# Patient Record
Sex: FEMALE | Race: BLACK OR AFRICAN AMERICAN | Employment: UNEMPLOYED | URBAN - METROPOLITAN AREA
[De-identification: names, ages, dates, MRNs, and addresses within clinical notes are randomized per-mention and may not be internally consistent; named-entity substitution may affect disease eponyms.]

---

## 2017-05-05 ENCOUNTER — OFFICE VISIT (OUTPATIENT)
Dept: HEMATOLOGY | Age: 37
End: 2017-05-05

## 2017-05-05 VITALS
HEIGHT: 68 IN | DIASTOLIC BLOOD PRESSURE: 98 MMHG | SYSTOLIC BLOOD PRESSURE: 140 MMHG | WEIGHT: 188.13 LBS | BODY MASS INDEX: 28.51 KG/M2 | OXYGEN SATURATION: 98 % | TEMPERATURE: 98.3 F | HEART RATE: 85 BPM

## 2017-05-05 DIAGNOSIS — R74.8 ELEVATED LIVER ENZYMES: Primary | ICD-10-CM

## 2017-05-05 PROBLEM — R73.9 STEROID-INDUCED HYPERGLYCEMIA: Status: ACTIVE | Noted: 2017-05-05

## 2017-05-05 PROBLEM — R56.9 SEIZURES (HCC): Status: ACTIVE | Noted: 2017-05-05

## 2017-05-05 PROBLEM — C71.9 GLIOBLASTOMA (HCC): Status: ACTIVE | Noted: 2017-05-05

## 2017-05-05 PROBLEM — R53.1 LEFT-SIDED WEAKNESS: Status: ACTIVE | Noted: 2017-05-05

## 2017-05-05 PROBLEM — T38.0X5A STEROID-INDUCED HYPERGLYCEMIA: Status: ACTIVE | Noted: 2017-05-05

## 2017-05-05 RX ORDER — OXYCODONE HCL 40 MG/1
40 TABLET, FILM COATED, EXTENDED RELEASE ORAL EVERY 12 HOURS
COMMUNITY
End: 2021-01-01

## 2017-05-05 RX ORDER — LACTULOSE 10 G/15ML
10 SOLUTION ORAL; RECTAL 3 TIMES DAILY
COMMUNITY
End: 2021-01-01

## 2017-05-05 RX ORDER — LEVETIRACETAM 250 MG/1
TABLET ORAL 2 TIMES DAILY
COMMUNITY
End: 2021-01-01 | Stop reason: DRUGHIGH

## 2017-05-05 RX ORDER — METFORMIN HYDROCHLORIDE 500 MG/1
500 TABLET ORAL 2 TIMES DAILY WITH MEALS
COMMUNITY
Start: 2021-01-01

## 2017-05-05 RX ORDER — FUROSEMIDE 20 MG/1
20 TABLET ORAL DAILY
COMMUNITY
End: 2021-01-01

## 2017-05-05 RX ORDER — DEXAMETHASONE 4 MG/1
TABLET ORAL EVERY 12 HOURS
COMMUNITY
End: 2021-01-01 | Stop reason: DRUGHIGH

## 2017-05-05 RX ORDER — POTASSIUM CHLORIDE 750 MG/1
10 CAPSULE, EXTENDED RELEASE ORAL 2 TIMES DAILY
COMMUNITY
End: 2021-01-01

## 2017-05-05 NOTE — PROGRESS NOTES
[address] Valentin Aguilar MD, JUANIS Terrell PA-C Celene Maudlin, MD, MD Mendy Fuchs NP Harriett Czar, NP        [address] [facility], [address] [facility], [address]     [phone]     FAX: 062 [address], [address] [place] [address], [address]     [phone]     FAX: [phone]       Patient Care Team:  Mike Edmondson MD (Hematology and Oncology)      Problem List  Date Reviewed: 5/5/2017          Codes Class Noted    Glioblastoma ([facility].) ICD-10-CM: C71.9  ICD-9-CM: 191.9  5/5/2017        Elevated liver enzymes ICD-10-CM: R74.8  ICD-9-CM: 790.5  5/5/2017        Seizures ([facility].) ICD-10-CM: R56.9  ICD-9-CM: 780.39  5/5/2017        Steroid-induced hyperglycemia ICD-10-CM: R73.9  ICD-9-CM: 790.29  5/5/2017    Overview Signed 5/5/2017 12:59 PM by Yeimy Brewster MD     Treated with metformin             Left-sided weakness ICD-10-CM: R53.1  ICD-9-CM: 728.87  5/5/2017                The physicians listed above have asked me to see Gurpreet Sterling in consultation regarding elevated liver enzymes and its management. All medical records sent by the referring physicians were reviewed including imaging studies and pathology. The patient is a 43 y.o. Black female with glioblastoma that was identified in 1/2016. She was treated with surgical resection, followed by XRT and now  chemotherapy. Chemotherapy started in about 9/2016. She was noted to have elevated liver transaminases and then a rising TBILI in 10-11/2016. Serologic evaluation for markers of chronic liver disease were either not performed or available to me. Imaging of the liver was either not performed or the results are not available to me. A liver biopsy was performed in 1/2017.    This demonstrated macrovesicualr steatosis with no inflammation or fibrosis. The most recent laboratory studies indicate that the liver transaminases are elevated, ALP is elevated, total bilirubin is elevated, albumin is normal, and the platelet count is normal.    The patient notes confusoion whenever the dose of decedron is reduced. Otherwise there is no confusion. There is edema of the left leg. The patient has limitations in functional activities secondary to other medical problems that are not related to the liver disease. ALLERGIES  Not on File    MEDICATIONS  Current Outpatient Prescriptions   Medication Sig    furosemide (LASIX) 20 mg tablet Take  by mouth daily.  levETIRAcetam (KEPPRA) 250 mg tablet Take  by mouth two (2) times a day.  potassium chloride SA (MICRO-K) 10 mEq capsule Take 10 mEq by mouth two (2) times a day.  sennosides (SENNA) 8.6 mg cap Take  by mouth.  oxyCODONE ER (OXYCONTIN) 40 mg ER tablet Take 40 mg by mouth every twelve (12) hours.  metFORMIN (GLUCOPHAGE) 500 mg tablet Take  by mouth two (2) times daily (with meals).  dexamethasone (DECADRON) 4 mg tablet Take  by mouth every twelve (12) hours.  lactulose (CHRONULAC) 10 gram/15 mL solution Take  by mouth three (3) times daily. No current facility-administered medications for this visit. SYSTEM REVIEW NOT RELATED TO LIVER DISEASE OR REVIEWED ABOVE:  Constitution systems: Negative for fever, chills, weight gain, weight loss. Eyes: Negative for visual changes. ENT: Negative for sore throat, painful swallowing. Respiratory: Negative for cough, hemoptysis, SOB. Cardiology: Negative for chest pain, palpitations. GI:  Negative for constipation or diarrhea. : Negative for urinary frequency, dysuria, hematuria, nocturia. Skin: Negative for rash. Hematology: Negative for easy bruising, blood clots. Musculo-skelatal: Negative for back pain, muscle pain, weakness.   Neurologic: Negative for headaches, dizziness, vertigo, memory problems not related to HE. Psychology: Negative for anxiety, depression. FAMILY HISTORY:  The patient has no knowledge of the father's medical condition. The mother  of CVA. There is no family history of liver disease. SOCIAL HISTORY:  The patient is . The patient has no children. The patient has never used tobacco products. The patient has previously consumed alcohol socially never in excess. The patient has been abstinent from alcohol since 2016. The patient used to work as a . PHYSICAL EXAMINATION:  BP (!) 140/98  Pulse 85  Temp 98.3 °F (36.8 °C) (Tympanic)   Ht 5' 8\" (1.727 m)  Wt 188 lb 2 oz (85.3 kg)  SpO2 98%  BMI 28.6 kg/m2  General: No acute distress. Eyes: Sclera anicteric. ENT: No oral lesions. Thyroid normal.  Nodes: No adenopathy. Skin: No spider angiomata. No jaundice. No palmar erythema. Respiratory: Lungs clear to auscultation. Cardiovascular: Regular heart rate. No murmurs. No JVD. Abdomen: Soft non-tender. Liver size normal to percussion/palpation. Spleen not palpable. No obvious ascites. Extremities: 2+ lower edema only on the left. No muscle wasting. No gross arthritic changes. Neurologic: Alert and oriented. Left sided weakness. Cranial nerves grossly intact. No asterixis.     LABORATORY STUDIES:  From 2017  AST/ALT/ALP/T Bili/ALB:  267/311/418/4.4    From 2017  AST/ALT/ALP/T Bili/ALB:  259/381/521/5.8/3.7  WBC/HB/PLT/INR:  12.2/10.3/283  BUN/CREAT:  10/0.45  Ammonia 512    Liver [facility] Latest Ref Rng & Units 2017   WBC 3.4 - 10.8 x10E3/uL 9.6   ANC 1.4 - 7.0 x10E3/uL 8.5 (H)   HGB 11.1 - 15.9 g/dL 9.3 (L)    - 379 x10E3/uL 239   INR 0.8 - 1.2 0.9   AST 0 - 40 IU/L 264 (H)   ALT 0 - 32 IU/L 354 (H)   Alk Phos 39 - 117 IU/L 521 (H)   Bili, Total 0.0 - 1.2 mg/dL 5.6 (H)   Bili, Direct 0.00 - 0.40 mg/dL 4.17 (H)   Albumin 3.5 - 5.5 g/dL 3.7   BUN 6 - 20 mg/dL 6   Creat 0.57 - 1.00 mg/dL 0.42 (L)   Na 134 - 144 mmol/L 137   K 3.5 - 5.2 mmol/L 3.9   Cl 96 - 106 mmol/L 93 (L)   CO2 18 - 29 mmol/L 26   Glucose 65 - 99 mg/dL 132 (H)   Ammonia ug/dL CANCELED     SEROLOGIES:  Serologies Latest Ref Rng & Units 5/5/2017   Hep B Surface Ag Negative Negative   Hep B Core Ab, Total Negative Negative   Hep B Surface AB QL  Non Reactive   Hep C Ab 0.0 - 0.9 s/co ratio <0.1     LIVER HISTOLOGY:  1/2017. Severe steatosis with no inflammation or fibrosis. Biopsy slides not reviewed by MLS. ENDOSCOPIC PROCEDURES:  Not available or performed    RADIOLOGY:  Not available or performed    OTHER TESTING:  Not available or performed    ASSESSMENT AND PLAN:  Persistent elevation in liver transaminases and alkaline phosphate. This appears to be a response to chemotherapy she is recieving for glioblastoma. Have performed laboratory testing to monitor liver function and degree of liver injury. This included BMP, hepatic panel, CBC with platelet count, INR. Total bilirubin is elevated but appers to have stabilized in the 5 range. Serum albumin is normal.  INR is normal.      The platelet count is normal.      Dr Jerry Thomas is concerned that she is developing liver failure. HOwever, the serum albumin is normal and the INR is normal.  This is not liver failure. The liver biopsy demonstrates severe steatosis without inflammation or fibrosis. This is likely an effect from chemotherapy which has caused some mitochondrial injury. She has never been on Valproic acid for seizures which can cause microvesicular steatosis and liver failure. The elevation in ammonia is concerning and suggests liver failure. However, measurements of serum ammonia are notoriously inaccurate and the actual level of serum ammonia does not indicate hepatic encephalopathy if the patient does not have symptoms of HE.     [address] [place] [address] has had periods of confusion but this is has only occurred when the dose of decradon is lowered. I therefore suspect that confusion is from increased intracranial pressure from the glioblastoma and edema from the tumor and chemotherapy. I do not think she has HE since the confusion improved with decedron and does not appear to be affected in any way by the lactulose she was placed on. On the other hand elevation in ammonia can be a sign of liver failure from mitochondrial toxicity and this is consistent with the steatosis seen in the liver biopsy. Again I do not think this is HE because of her improvement in cognitive function with decedrone. Serologic testing to for viral hepatitis were ordered since I have no records these were done previously. I suspect they were done by Dr Kris Vázquez but I do not have access to his notes or labs. 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 Unfortunately, the only thing I think we can do right now is to hold the chemotherapy agents we think contributed to this and see if this resolves. I will discuss with Dr Glo Rinne how long she things she can hold this or whether she can switch to an alternate chemotherapy regimen. We should monitor the liver panel and ammonia weekly to see what is happening. The patient had a liver biopsy performed in 1/2017 at [facility]. We will request that the liver biopsy slides sent be to me for my personal review. The patient was directed to continue all current medications at the current dosages. There are no contraindications for the patient to take any medications that are necessary for treatment of other medical issues. The patient was counseled regarding alcohol consumption. We should do an MRCP if this has not been previously performed. All of the above issues were discussed with the patient. All questions were answered. The patient expressed a clear understanding of the above.     Follow-up Liver Morgan Ochoa in 2 weeks to review all data and determine the treatment plan.     Brennon Izaguirre MD  Liver [place] of [address] [place] [address] [address] [facility] [address]y [address]  [address]  [address].  [phone]

## 2017-05-05 NOTE — MR AVS SNAPSHOT
Visit Information Date & Time Provider Department Dept. Phone Encounter #  
 5/5/2017 12:30 PM Sonal Rubio MD [facility] [id number] Follow-up Instructions Return in about 2 weeks (around 5/19/2017) for MLS. Your Appointments 5/18/2017  8:15 AM  
Follow Up with Sonal Rubio MD  
[facility] [address] ([address]) [address] [place] [address] At [facility] [phone] [facility] [address]  
59 [facility] [address] Upcoming Health Maintenance Date Due DTaP/Tdap/Td series (1 - Tdap) 6/12/1999 PAP AKA CERVICAL CYTOLOGY 6/12/1999 INFLUENZA AGE 9 TO ADULT 8/1/2017 Allergies as of 5/5/2017  Review Complete On: 5/5/2017 By: Bladimir Bass LPN Not on File Current Immunizations  Never Reviewed No immunizations on file. Not reviewed this visit You Were Diagnosed With   
  
 Codes Comments Elevated liver enzymes    -  Primary ICD-10-CM: R74.8 ICD-9-CM: 790.5 Vitals BP Pulse Temp Height(growth percentile) Weight(growth percentile) SpO2  
 (!) 140/98 85 98.3 °F (36.8 °C) (Tympanic) 5' 8\" (1.727 m) 188 lb 2 oz (85.3 kg) 98% BMI Smoking Status 28.6 kg/m2 Never Smoker BMI and BSA Data Body Mass Index Body Surface Area  
 28.6 kg/m 2 2.02 m 2 Your Updated Medication List  
  
   
This list is accurate as of: 5/5/17  1:23 PM.  Always use your most recent med list.  
  
  
  
  
 dexamethasone 4 mg tablet Commonly known as:  DECADRON Take  by mouth every twelve (12) hours. furosemide 20 mg tablet Commonly known as:  LASIX Take  by mouth daily. lactulose 10 gram/15 mL solution Commonly known as:  Clemmie Remak Take  by mouth three (3) times daily. levETIRAcetam 250 mg tablet Commonly known as:  KEPPRA Take  by mouth two (2) times a day. metFORMIN 500 mg tablet Commonly known as:  GLUCOPHAGE Take  by mouth two (2) times daily (with meals). oxyCODONE ER 40 mg ER tablet Commonly known as:  OxyCONTIN Take 40 mg by mouth every twelve (12) hours. potassium chloride SA 10 mEq capsule Commonly known as:  Julaine Postin Take 10 mEq by mouth two (2) times a day. Senna 8.6 mg Cap Generic drug:  sennosides Take  by mouth. We Performed the Following AMMONIA [id number] CPT(R)] CBC WITH AUTOMATED DIFF [46601 CPT(R)] HCV AB W/REFLEX VERIFICATION [[id number] Custom] HEP B SURFACE AB [id number] CPT(R)] HEP B SURFACE AG [id number] CPT(R)] HEPATIC FUNCTION PANEL [65146 CPT(R)] HEPATITIS B CORE AB, TOTAL [id number] CPT(R)] METABOLIC PANEL, BASIC [95863 CPT(R)] PROTHROMBIN TIME + INR [38240 CPT(R)] Follow-up Instructions Return in about 2 weeks (around 5/19/2017) for MLS. Introducing [facility] & HEALTH SERVICES! Pamela Jain introduces Graphenea patient portal. Now you can access parts of your medical record, email your doctor's office, and request medication refills online. 1. In your internet browser, go to [email or web address] 2. Click on the First Time User? Click Here link in the Sign In box. You will see the New Member Sign Up page. 3. Enter your Graphenea Access Code exactly as it appears below. You will not need to use this code after youve completed the sign-up process. If you do not sign up before the expiration date, you must request a new code. · Graphenea Access Code: [email or web address] Expires: 8/3/2017  1:17 PM 
 
4. Enter the last four digits of your Social Security Number (xxxx) and Date of Birth (mm/dd/yyyy) as indicated and click Submit. You will be taken to the next sign-up page. 5. Create a Contego [employer]ud Solutionst ID. This will be your Graphenea login ID and cannot be changed, so think of one that is secure and easy to remember. 6. Create a Graphenea password. You can change your password at any time. 7. Enter your Password Reset Question and Answer. This can be used at a later time if you forget your password. 8. Enter your e-mail address. You will receive e-mail notification when new information is available in [address] 9. Click Sign Up. You can now view and download portions of your medical record. 10. Click the Download Summary menu link to download a portable copy of your medical information. If you have questions, please visit the Frequently Asked Questions section of the Kickboard website. Remember, Kickboard is NOT to be used for urgent needs. For medical emergencies, dial 911. Now available from your iPhone and Android! Please provide this summary of care documentation to your next provider. If you have any questions after today's visit, please call [phone].

## 2017-05-06 LAB
ALBUMIN SERPL-MCNC: 3.7 G/DL (ref 3.5–5.5)
ALP SERPL-CCNC: 521 IU/L (ref 39–117)
ALT SERPL-CCNC: 354 IU/L (ref 0–32)
AMMONIA PLAS-MCNC: NORMAL UG/DL (ref 27–102)
AST SERPL-CCNC: 264 IU/L (ref 0–40)
BASOPHILS # BLD AUTO: 0 X10E3/UL (ref 0–0.2)
BASOPHILS NFR BLD AUTO: 0 %
BILIRUB DIRECT SERPL-MCNC: 4.17 MG/DL (ref 0–0.4)
BILIRUB SERPL-MCNC: 5.6 MG/DL (ref 0–1.2)
BUN SERPL-MCNC: 6 MG/DL (ref 6–20)
BUN/CREAT SERPL: 14 (ref 9–23)
CALCIUM SERPL-MCNC: 9 MG/DL (ref 8.7–10.2)
CHLORIDE SERPL-SCNC: 93 MMOL/L (ref 96–106)
CO2 SERPL-SCNC: 26 MMOL/L (ref 18–29)
COMMENT, 144067: NORMAL
CREAT SERPL-MCNC: 0.42 MG/DL (ref 0.57–1)
EOSINOPHIL # BLD AUTO: 0 X10E3/UL (ref 0–0.4)
EOSINOPHIL NFR BLD AUTO: 0 %
ERYTHROCYTE [DISTWIDTH] IN BLOOD BY AUTOMATED COUNT: 20.7 % (ref 12.3–15.4)
GLUCOSE SERPL-MCNC: 132 MG/DL (ref 65–99)
HBV CORE AB SERPL QL IA: NEGATIVE
HBV SURFACE AB SER QL: NON REACTIVE
HBV SURFACE AG SERPL QL IA: NEGATIVE
HCT VFR BLD AUTO: 31 % (ref 34–46.6)
HCV AB S/CO SERPL IA: <0.1 S/CO RATIO (ref 0–0.9)
HGB BLD-MCNC: 9.3 G/DL (ref 11.1–15.9)
IMM GRANULOCYTES # BLD: 0.1 X10E3/UL (ref 0–0.1)
IMM GRANULOCYTES NFR BLD: 1 %
INR PPP: 0.9 (ref 0.8–1.2)
LYMPHOCYTES # BLD AUTO: 0.5 X10E3/UL (ref 0.7–3.1)
LYMPHOCYTES NFR BLD AUTO: 5 %
MCH RBC QN AUTO: 26.6 PG (ref 26.6–33)
MCHC RBC AUTO-ENTMCNC: 30 G/DL (ref 31.5–35.7)
MCV RBC AUTO: 89 FL (ref 79–97)
MONOCYTES # BLD AUTO: 0.6 X10E3/UL (ref 0.1–0.9)
MONOCYTES NFR BLD AUTO: 7 %
NEUTROPHILS # BLD AUTO: 8.5 X10E3/UL (ref 1.4–7)
NEUTROPHILS NFR BLD AUTO: 87 %
PLATELET # BLD AUTO: 239 X10E3/UL (ref 150–379)
POTASSIUM SERPL-SCNC: 3.9 MMOL/L (ref 3.5–5.2)
PROT SERPL-MCNC: 6.1 G/DL (ref 6–8.5)
PROTHROMBIN TIME: 9.8 SEC (ref 9.1–12)
RBC # BLD AUTO: 3.49 X10E6/UL (ref 3.77–5.28)
SODIUM SERPL-SCNC: 137 MMOL/L (ref 134–144)
WBC # BLD AUTO: 9.6 X10E3/UL (ref 3.4–10.8)

## 2017-05-18 ENCOUNTER — OFFICE VISIT (OUTPATIENT)
Dept: HEMATOLOGY | Age: 37
End: 2017-05-18

## 2017-05-18 VITALS
TEMPERATURE: 98.8 F | HEART RATE: 84 BPM | DIASTOLIC BLOOD PRESSURE: 97 MMHG | OXYGEN SATURATION: 99 % | SYSTOLIC BLOOD PRESSURE: 151 MMHG | WEIGHT: 191 LBS | HEIGHT: 68 IN | BODY MASS INDEX: 28.95 KG/M2

## 2017-05-18 DIAGNOSIS — K71.9 DRUG INDUCED LIVER DISEASE: Primary | ICD-10-CM

## 2017-05-18 PROBLEM — K76.0 FATTY LIVER: Status: ACTIVE | Noted: 2017-05-18

## 2017-05-18 LAB
BASOPHILS # BLD AUTO: 0 X10E3/UL (ref 0–0.2)
BASOPHILS NFR BLD AUTO: 0 %
EOSINOPHIL # BLD AUTO: 0 X10E3/UL (ref 0–0.4)
EOSINOPHIL NFR BLD AUTO: 0 %
ERYTHROCYTE [DISTWIDTH] IN BLOOD BY AUTOMATED COUNT: 20.3 % (ref 12.3–15.4)
HCT VFR BLD AUTO: 32.6 % (ref 34–46.6)
HGB BLD-MCNC: 9.6 G/DL (ref 11.1–15.9)
IMM GRANULOCYTES # BLD: 0.1 X10E3/UL (ref 0–0.1)
IMM GRANULOCYTES NFR BLD: 1 %
LYMPHOCYTES # BLD AUTO: 0.8 X10E3/UL (ref 0.7–3.1)
LYMPHOCYTES NFR BLD AUTO: 8 %
MCH RBC QN AUTO: 26.4 PG (ref 26.6–33)
MCHC RBC AUTO-ENTMCNC: 29.4 G/DL (ref 31.5–35.7)
MCV RBC AUTO: 90 FL (ref 79–97)
MONOCYTES # BLD AUTO: 0.8 X10E3/UL (ref 0.1–0.9)
MONOCYTES NFR BLD AUTO: 7 %
NEUTROPHILS # BLD AUTO: 8.3 X10E3/UL (ref 1.4–7)
NEUTROPHILS NFR BLD AUTO: 84 %
PLATELET # BLD AUTO: 285 X10E3/UL (ref 150–379)
RBC # BLD AUTO: 3.64 X10E6/UL (ref 3.77–5.28)
WBC # BLD AUTO: 10.1 X10E3/UL (ref 3.4–10.8)

## 2017-05-18 NOTE — MR AVS SNAPSHOT
Visit Information Date & Time Provider Department Dept. Phone Encounter #  
 5/18/2017  8:15 AM Samir Brewster MD [facility] [address] [id number] Follow-up Instructions Return in about 2 weeks (around 6/1/2017) for MLS. Your Appointments 6/13/2017  4:00 PM  
Follow Up with Samir Brewster MD  
[facility] Appt Note: Follow up [address] [facility] [phone] [facility] [address]  
59 [facility] [address] Upcoming Health Maintenance Date Due Pneumococcal 19-64 Highest Risk (1 of 3 - PCV13) 6/12/1997 DTaP/Tdap/Td series (1 - Tdap) 6/12/1999 PAP AKA CERVICAL CYTOLOGY 6/12/1999 INFLUENZA AGE 9 TO ADULT 8/1/2017 Allergies as of 5/18/2017  Review Complete On: 5/18/2017 By: Kaity Alexandra LPN Not on File Current Immunizations  Never Reviewed No immunizations on file. Not reviewed this visit You Were Diagnosed With   
  
 Codes Comments Drug induced liver disease    -  Primary ICD-10-CM: K71.9 ICD-9-CM: 573.3, E980.5 Vitals BP Pulse Temp Height(growth percentile) Weight(growth percentile) SpO2  
 (!) 151/97 84 98.8 °F (37.1 °C) (Tympanic) 5' 8\" (1.727 m) 191 lb (86.6 kg) 99% BMI Smoking Status 29.04 kg/m2 Never Smoker BMI and BSA Data Body Mass Index Body Surface Area 29.04 kg/m 2 2.04 m 2 Your Updated Medication List  
  
   
This list is accurate as of: 5/18/17  9:05 AM.  Always use your most recent med list.  
  
  
  
  
 dexamethasone 4 mg tablet Commonly known as:  DECADRON Take  by mouth every twelve (12) hours. furosemide 20 mg tablet Commonly known as:  LASIX Take  by mouth daily. lactulose 10 gram/15 mL solution Commonly known as:  Merlinda Mazariegos Take  by mouth three (3) times daily. levETIRAcetam 250 mg tablet Commonly known as:  KEPPRA Take  by mouth two (2) times a day. metFORMIN 500 mg tablet Commonly known as:  GLUCOPHAGE Take  by mouth two (2) times daily (with meals). oxyCODONE ER 40 mg ER tablet Commonly known as:  OxyCONTIN Take 40 mg by mouth every twelve (12) hours. potassium chloride SA 10 mEq capsule Commonly known as:  Sydni Bleak Take 10 mEq by mouth two (2) times a day. Senna 8.6 mg Cap Generic drug:  sennosides Take  by mouth. We Performed the Following ACTIN (SMOOTH MUSCLE) ANTIBODY [id number] CPT(R)] ANTI-NEUTROPHIL CYTOPLASMIC AB F315258 CPT(R)] ANTINUCLEAR ANTIBODIES, IFA S2435211 CPT(R)] CBC WITH AUTOMATED DIFF [93747 CPT(R)] HEPATIC FUNCTION PANEL [47018 CPT(R)] METABOLIC PANEL, BASIC [56408 CPT(R)] MITOCHONDRIAL M2 AB [id number] CPT(R)] PROTHROMBIN TIME + INR [66795 CPT(R)] Follow-up Instructions Return in about 2 weeks (around 6/1/2017) for MLS. To-Do List   
 06/17/2017 Imaging:  MRI ABD W MRCP W WO CONT Introducing [facility] & HEALTH SERVICES! Milton Bain introduces Memorop patient portal. Now you can access parts of your medical record, email your doctor's office, and request medication refills online. 1. In your internet browser, go to [email or web address] 2. Click on the First Time User? Click Here link in the Sign In box. You will see the New Member Sign Up page. 3. Enter your Memorop Access Code exactly as it appears below. You will not need to use this code after youve completed the sign-up process. If you do not sign up before the expiration date, you must request a new code. · Memorop Access Code: [email or web address] Expires: 8/3/2017  1:17 PM 
 
4. Enter the last four digits of your Social Security Number (xxxx) and Date of Birth (mm/dd/yyyy) as indicated and click Submit. You will be taken to the next sign-up page. 5. Create a maufait ID. This will be your maufait login ID and cannot be changed, so think of one that is secure and easy to remember. 6. Create a maufait password. You can change your password at any time. 7. Enter your Password Reset Question and Answer. This can be used at a later time if you forget your password. 8. Enter your e-mail address. You will receive e-mail notification when new information is available in [address] 9. Click Sign Up. You can now view and download portions of your medical record. 10. Click the Download Summary menu link to download a portable copy of your medical information. If you have questions, please visit the Frequently Asked Questions section of the maufait website. Remember, maufait is NOT to be used for urgent needs. For medical emergencies, dial 911. Now available from your iPhone and Android! Please provide this summary of care documentation to your next provider. If you have any questions after today's visit, please call [phone].

## 2017-05-19 LAB
ACTIN IGG SERPL-ACNC: 6 UNITS (ref 0–19)
ALBUMIN SERPL-MCNC: 3.6 G/DL (ref 3.5–5.5)
ALP SERPL-CCNC: 615 IU/L (ref 39–117)
ALT SERPL-CCNC: 372 IU/L (ref 0–32)
ANA TITR SER IF: NEGATIVE {TITER}
AST SERPL-CCNC: 316 IU/L (ref 0–40)
BILIRUB DIRECT SERPL-MCNC: 4.48 MG/DL (ref 0–0.4)
BILIRUB SERPL-MCNC: 6 MG/DL (ref 0–1.2)
BUN SERPL-MCNC: 9 MG/DL (ref 6–20)
BUN/CREAT SERPL: 15 (ref 9–23)
C-ANCA TITR SER IF: NORMAL TITER
CALCIUM SERPL-MCNC: 9.4 MG/DL (ref 8.7–10.2)
CHLORIDE SERPL-SCNC: 94 MMOL/L (ref 96–106)
CO2 SERPL-SCNC: 25 MMOL/L (ref 18–29)
CREAT SERPL-MCNC: 0.61 MG/DL (ref 0.57–1)
GLUCOSE SERPL-MCNC: 142 MG/DL (ref 65–99)
INR PPP: 0.9 (ref 0.8–1.2)
MITOCHONDRIA M2 IGG SER-ACNC: 6.8 UNITS (ref 0–20)
P-ANCA ATYPICAL TITR SER IF: NORMAL TITER
P-ANCA TITR SER IF: NORMAL TITER
POTASSIUM SERPL-SCNC: 4.8 MMOL/L (ref 3.5–5.2)
PROT SERPL-MCNC: 6.1 G/DL (ref 6–8.5)
PROTHROMBIN TIME: 9.8 SEC (ref 9.1–12)
SODIUM SERPL-SCNC: 136 MMOL/L (ref 134–144)

## 2017-05-28 NOTE — PROGRESS NOTES
93 Daren Lipscomb MD, [address]     April Santos Amaya, CHARLOTTE Laguerre MD, [facility] Mendy Eden NP Harriett Czar, NP        9964 [facility], [address] Viry Valdes  22.     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.9  ICD-9-CM: 191.9  5/5/2017        Elevated liver enzymes ICD-10-CM: R74.8  ICD-9-CM: 790.5  5/5/2017        Seizures ([facility] 75.) ICD-10-CM: R56.9  ICD-9-CM: 780.39  5/5/2017        Steroid-induced hyperglycemia ICD-10-CM: R73.9  ICD-9-CM: 790.29  5/5/2017    Overview Signed 5/5/2017 12:59 PM by Yeimy Brewster MD     Treated with metformin             Left-sided weakness ICD-10-CM: R53.1  ICD-9-CM: 728.87  5/5/2017                Gurpreet Sterling returns to the [facility] [place] for management of elevated liver enzymes and fatty liver which appears to be due to chemotherapy. The active problem list, all pertinent past medical history, medications, liver histology, radiologic findings and laboratory findings related to the liver disorder were reviewed with the patient. The patient is a 43 y.o. Black female with glioblastoma that was identified in 1/2016. She was treated with surgical resection, followed by XRT and now  chemotherapy. Chemotherapy started in about 9/2016. She was noted to have elevated liver transaminases and then a rising TBILI in 10-11/2016.     Serologic evaluation for markers of chronic liver disease were negative. Imaging of the liver was either not performed or the results are not available to me. A liver biopsy was performed in 1/2017. Since the last appointment I have since received and reviewed the patient's liver biopsy slides from [facility]. This demonstrates benign fatty liver with no fibrosis or necrosis. The most recent laboratory studies indicate that the liver transaminases are elevated, ALP is elevated, total bilirubin is elevated, albumin is normal, and the platelet count is normal.    The patient notes confusoion whenever the dose of decedron is reduced. Otherwise there is no confusion. There is edema of the left leg. The patient has limitations in functional activities secondary to other medical problems that are not related to the liver disease. ALLERGIES  Not on File    MEDICATIONS  Current Outpatient Prescriptions   Medication Sig    furosemide (LASIX) 20 mg tablet Take  by mouth daily.  levETIRAcetam (KEPPRA) 250 mg tablet Take  by mouth two (2) times a day.  potassium chloride SA (MICRO-K) 10 mEq capsule Take 10 mEq by mouth two (2) times a day.  sennosides (SENNA) 8.6 mg cap Take  by mouth.  oxyCODONE ER (OXYCONTIN) 40 mg ER tablet Take 40 mg by mouth every twelve (12) hours.  metFORMIN (GLUCOPHAGE) 500 mg tablet Take  by mouth two (2) times daily (with meals).  dexamethasone (DECADRON) 4 mg tablet Take  by mouth every twelve (12) hours.  lactulose (CHRONULAC) 10 gram/15 mL solution Take  by mouth three (3) times daily. No current facility-administered medications for this visit. SYSTEM REVIEW NOT RELATED TO LIVER DISEASE OR REVIEWED ABOVE:  Constitution systems: Negative for fever, chills, weight gain, weight loss. Eyes: Negative for visual changes. ENT: Negative for sore throat, painful swallowing. Respiratory: Negative for cough, hemoptysis, SOB.    Cardiology: Negative for chest pain, palpitations. GI:  Negative for constipation or diarrhea. : Negative for urinary frequency, dysuria, hematuria, nocturia. Skin: Negative for rash. Hematology: Negative for easy bruising, blood clots. Musculo-skelatal: Negative for back pain, muscle pain, weakness. Neurologic: Negative for headaches, dizziness, vertigo, memory problems not related to HE. Psychology: Negative for anxiety, depression. FAMILY HISTORY:  The patient has no knowledge of the father's medical condition. The mother  of CVA. There is no family history of liver disease. SOCIAL HISTORY:  The patient is . The patient has no children. The patient has never used tobacco products. The patient has previously consumed alcohol socially never in excess. The patient has been abstinent from alcohol since 2016. The patient used to work as a . PHYSICAL EXAMINATION:  BP (!) 151/97  Pulse 84  Temp 98.8 °F (37.1 °C) (Tympanic)   Ht 5' 8\" (1.727 m)  Wt 191 lb (86.6 kg)  SpO2 99%  BMI 29.04 kg/m2  General: No acute distress. Eyes: Sclera anicteric. ENT: No oral lesions. Thyroid normal.  Nodes: No adenopathy. Skin: No spider angiomata. No jaundice. No palmar erythema. Respiratory: Lungs clear to auscultation. Cardiovascular: Regular heart rate. No murmurs. No JVD. Abdomen: Soft non-tender. Liver size normal to percussion/palpation. Spleen not palpable. No obvious ascites. Extremities: 2+ lower edema only on the left. No muscle wasting. No gross arthritic changes. Neurologic: Alert and oriented. Left sided weakness. Cranial nerves grossly intact. No asterixis.     LABORATORY STUDIES:  [facility] of [address] [place] [address] & Units 2017   WBC 3.4 - 10.8 x10E3/uL 10.1 9.6   ANC 1.4 - 7.0 x10E3/uL 8.3 (H) 8.5 (H)   HGB 11.1 - 15.9 g/dL 9.6 (L) 9.3 (L)    - 379 x10E3/uL 285 239   INR 0.8 - 1.2 0.9 0.9   AST 0 - 40 IU/L 316 (H) 264 (H) ALT 0 - 32 IU/L 372 (H) 354 (H)   Alk Phos 39 - 117 IU/L 615 (H) 521 (H)   Bili, Total 0.0 - 1.2 mg/dL 6.0 (H) 5.6 (H)   Bili, Direct 0.00 - 0.40 mg/dL 4.48 (H) 4.17 (H)   Albumin 3.5 - 5.5 g/dL 3.6 3.7   BUN 6 - 20 mg/dL 9 6   Creat 0.57 - 1.00 mg/dL 0.61 0.42 (L)   Na 134 - 144 mmol/L 136 137   K 3.5 - 5.2 mmol/L 4.8 3.9   Cl 96 - 106 mmol/L 94 (L) 93 (L)   CO2 18 - 29 mmol/L 25 26   Glucose 65 - 99 mg/dL 142 (H) 132 (H)   Ammonia ug/dL  CANCELED     SEROLOGIES:  Serologies Latest Ref Rng & Units 5/18/2017 5/5/2017   Hep B Surface Ag Negative  Negative   Hep B Core Ab, Total Negative  Negative   Hep B Surface AB QL   Non Reactive   Hep C Ab 0.0 - 0.9 s/co ratio  <0.1   CORAL, IFA  Negative    C-ANCA Neg:<1:20 titer <1:20    P-ANCA Neg:<1:20 titer <1:20    ANCA Neg:<1:20 titer <1:20    ASMCA 0 - 19 Units 6    M2 Ab 0.0 - 20.0 Units 6.8      LIVER HISTOLOGY:  1/2017. Slides reviewed by MLS. Benign appearing steatosis. >90% macro and micovesicular steatosis. No ballooning. No inflammation. No fibrosis. ENDOSCOPIC PROCEDURES:  Not available or performed    RADIOLOGY:  Not available or performed    OTHER TESTING:  Not available or performed    ASSESSMENT AND PLAN:  Persistent elevation in liver transaminases and alkaline phosphate. This appears to be a drug induced reaction to chemotherapy she is recieving for glioblastoma. Have performed laboratory testing to monitor liver function and degree of liver injury. This included BMP, hepatic panel, CBC with platelet count, INR. Total bilirubin is elevated but appers to have stabilized in the 5-6 range. Serum albumin is normal.  INR is normal.  So there is no evidence of liver failure. The platelet count is normal.      The liver biopsy demonstrates severe steatosis without inflammation or fibrosis. This is likely an effect from chemotherapy which has caused some mitochondrial injury.   She has never been on Valproic acid for seizures which can cause microvesicular steatosis and liver failure. Keppra would be the safest anti-seizure mediation to be on with liver injury. The elevation in ammonia is concerning and suggests liver failure. However, measurements of serum ammonia are notoriously inaccurate and the actual level of serum ammonia does not indicate hepatic encephalopathy if the patient does not have symptoms of HE. Jose Garces has had periods of confusion but this is has only occurred when the dose of decradon is lowered. I therefore suspect that confusion is from increased intracranial pressure from the glioblastoma and edema from the tumor and chemotherapy. I do not think she has HE since the confusion improved with decedron and does not appear to be affected in any way by the lactulose she was placed on. On the other hand elevation in ammonia can be a sign of liver failure from mitochondrial toxicity and this is consistent with the steatosis seen in the liver biopsy. Again I do not think this is HE because of her improvement in cognitive function with decedrone. Serologic testing for all causes of liver injury were all negative. 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 Unfortunately, the only thing I think we can do right now is to hold the chemotherapy agents we think contributed to this and see if this resolves. We should monitor the liver panel and ammonia weekly. The patient was directed to continue all current medications at the current dosages. There are no contraindications for the patient to take any medications that are necessary for treatment of other medical issues. The patient was counseled regarding alcohol consumption. We should do an MRCP if this has not been previously performed. All of the above issues were discussed with the patient. All questions were answered.   The patient expressed a clear understanding of the above. [address] [address] in 2 weeks to review all data and determine the treatment plan.     Yeimy Brewster MD  Liver [place] of [address] [place] [address] 2718 [facility] [address] [place] [address]  22.  [phone]

## 2017-07-28 ENCOUNTER — OFFICE VISIT (OUTPATIENT)
Dept: HEMATOLOGY | Age: 37
End: 2017-07-28

## 2017-07-28 VITALS
WEIGHT: 188.6 LBS | SYSTOLIC BLOOD PRESSURE: 147 MMHG | TEMPERATURE: 98.9 F | HEIGHT: 68 IN | HEART RATE: 96 BPM | OXYGEN SATURATION: 98 % | DIASTOLIC BLOOD PRESSURE: 94 MMHG | BODY MASS INDEX: 28.58 KG/M2

## 2017-07-28 DIAGNOSIS — K71.9 DRUG INDUCED LIVER DISEASE: Primary | ICD-10-CM

## 2017-07-28 RX ORDER — GABAPENTIN 300 MG/1
300 CAPSULE ORAL 3 TIMES DAILY
COMMUNITY
End: 2021-01-01

## 2017-07-28 NOTE — MR AVS SNAPSHOT
Visit Information Date & Time Provider Department Dept. Phone Encounter #  
 7/28/2017  4:30 PM Vinh Rodriguez MD [facility] [id number] Your Appointments 9/21/2017  9:15 AM  
Follow Up with Vinh Rodriguez MD  
[facility] of [address] ([facility] 200 [facility] [phone] [place] [address] [facility] [address]  
59 [facility] [address] Upcoming Health Maintenance Date Due Pneumococcal 19-64 Highest Risk (1 of 3 - PCV13) 6/12/1997 DTaP/Tdap/Td series (1 - Tdap) 6/12/1999 PAP AKA CERVICAL CYTOLOGY 6/12/1999 INFLUENZA AGE 9 TO ADULT 8/1/2017 Allergies as of 7/28/2017  Review Complete On: 5/28/2017 By: Vinh Rodriguez MD  
 Not on File Current Immunizations  Never Reviewed No immunizations on file. Not reviewed this visit You Were Diagnosed With   
  
 Codes Comments Drug induced liver disease    -  Primary ICD-10-CM: K71.9 ICD-9-CM: 573.3, E980.5 Vitals BP Pulse Temp Height(growth percentile) (!) 147/94 (BP 1 Location: Left arm, BP Patient Position: Sitting) 96 98.9 °F (37.2 °C) (Tympanic) 5' 8\" (1.727 m) Weight(growth percentile) SpO2 BMI Smoking Status 188 lb 9.6 oz (85.5 kg) 98% 28.68 kg/m2 Never Smoker BMI and BSA Data Body Mass Index Body Surface Area  
 28.68 kg/m 2 2.03 m 2 Your Updated Medication List  
  
   
This list is accurate as of: 7/28/17  4:34 PM.  Always use your most recent med list.  
  
  
  
  
 BUMETANIDE Take 1 mg by mouth daily. dexamethasone 4 mg tablet Commonly known as:  DECADRON Take  by mouth every twelve (12) hours. furosemide 20 mg tablet Commonly known as:  LASIX Take  by mouth daily. gabapentin 300 mg capsule Commonly known as:  NEURONTIN Take 300 mg by mouth three (3) times daily. lactulose 10 gram/15 mL solution Commonly known as:  Lisset Bogaert Take  by mouth three (3) times daily. levETIRAcetam 250 mg tablet Commonly known as:  KEPPRA Take  by mouth two (2) times a day. metFORMIN 500 mg tablet Commonly known as:  GLUCOPHAGE Take  by mouth two (2) times daily (with meals). oxyCODONE ER 40 mg ER tablet Commonly known as:  OxyCONTIN Take 40 mg by mouth every twelve (12) hours. potassium chloride SA 10 mEq capsule Commonly known as:  Ebbie Skeans Take 10 mEq by mouth two (2) times a day. Senna 8.6 mg Cap Generic drug:  sennosides Take  by mouth. We Performed the Following CBC WITH AUTOMATED DIFF [86507 CPT(R)] HEPATIC FUNCTION PANEL [00908 CPT(R)] METABOLIC PANEL, BASIC [73443 CPT(R)] PROTHROMBIN TIME + INR [66289 CPT(R)] Introducing [facility]! [employer] introduces [employer] patient portal. Now you can access parts of your medical record, email your doctor's office, and request medication refills online. 1. In your internet browser, go to [email or web address] 2. Click on the First Time User? Click Here link in the Sign In box. You will see the New Member Sign Up page. 3. Enter your [employer] Access Code exactly as it appears below. You will not need to use this code after youve completed the sign-up process. If you do not sign up before the expiration date, you must request a new code. · [employer] Access Code: [email or web address] Expires: 8/3/2017  1:17 PM 
 
4. Enter the last four digits of your Social Security Number (xxxx) and Date of Birth (mm/dd/yyyy) as indicated and click Submit. You will be taken to the next sign-up page. 5. Create a [employer] ID. This will be your [employer] login ID and cannot be changed, so think of one that is secure and easy to remember. 6. Create a [employer] password. You can change your password at any time. 7. Enter your Password Reset Question and Answer.  This can be used at a later time if you forget your password. 8. Enter your e-mail address. You will receive e-mail notification when new information is available in [address] 9. Click Sign Up. You can now view and download portions of your medical record. 10. Click the Download Summary menu link to download a portable copy of your medical information. If you have questions, please visit the Frequently Asked Questions section of the [employer] website. Remember, [employer] is NOT to be used for urgent needs. For medical emergencies, dial 911. Now available from your iPhone and Android! Please provide this summary of care documentation to your next provider. Your primary care clinician is listed as Eunice Pabon. If you have any questions after today's visit, please call [phone].

## 2017-07-28 NOTE — PROGRESS NOTES
[address], MD, Geoffrey urias, Patricia Nuviaroni Sr     April Lauren Wood, CHARLOTTE Ivy MD, [place], MD Mendy Henry NP Edson Labrum, NP [facility]     217 [facility], [address] [facility], [place]  22.     [phone]     FAX: [address] [place] [address], [address] [place] [address], [address]     [phone]     FAX: [phone]       Patient Care Team:  Melodie Bethea NP as PCP - General (Nurse Practitioner)  Jono Pimentel MD (Hematology and Oncology)      Problem List  Date Reviewed: 5/28/2017          Codes Class Noted    Fatty liver ICD-10-CM: K76.0  ICD-9-CM: 571.8  5/18/2017    Overview Signed 5/18/2017  8:55 AM by Eloisa Maurice MD     Drug induced. Glioblastoma ([facility] 75.) ICD-10-CM: C71.9  ICD-9-CM: 191.9  5/5/2017        Elevated liver enzymes ICD-10-CM: R74.8  ICD-9-CM: 790.5  5/5/2017        Seizures ([facility] 75.) ICD-10-CM: R56.9  ICD-9-CM: 780.39  5/5/2017        Steroid-induced hyperglycemia ICD-10-CM: R73.9  ICD-9-CM: 790.29  5/5/2017    Overview Signed 5/5/2017 12:59 PM by Eloisa Maurice MD     Treated with metformin             Left-sided weakness ICD-10-CM: R53.1  ICD-9-CM: 728.87  5/5/2017                Delmer Pollock returns to the [address] for management of elevated liver enzymes and fatty liver which appears to be due to chemotherapy. The active problem list, all pertinent past medical history, medications, liver histology, radiologic findings and laboratory findings related to the liver disorder were reviewed with the patient. The patient is a 42 y.o. Black female with glioblastoma that was identified in 1/2016. She was treated with surgical resection, followed by XRT and now  chemotherapy. Chemotherapy started in about 9/2016.       She was noted to have elevated liver transaminases and then a rising TBILI in 10-11/2016. Serologic evaluation for markers of chronic liver disease were negative. Imaging of the liver was either not performed or the results are not available to me. A liver biopsy was performed in 1/2017. This demonstrates benign fatty liver with no fibrosis or necrosis. The most recent laboratory studies indicate that the liver transaminases are elevated, ALP is elevated, total bilirubin is elevated, albumin is normal, and the platelet count is normal.    The patient notes confusoion whenever the dose of decedron is reduced. Otherwise there is no confusion. There is edema of the left leg. The patient has limitations in functional activities secondary to other medical problems that are not related to the liver disease. ALLERGIES  Not on File    MEDICATIONS  Current Outpatient Prescriptions   Medication Sig    gabapentin (NEURONTIN) 300 mg capsule Take 300 mg by mouth three (3) times daily.  BUMETANIDE by Does Not Apply route. New RX as of 7/28. Does not know dose.  levETIRAcetam (KEPPRA) 250 mg tablet Take  by mouth two (2) times a day.  potassium chloride SA (MICRO-K) 10 mEq capsule Take 10 mEq by mouth two (2) times a day.  sennosides (SENNA) 8.6 mg cap Take  by mouth.  dexamethasone (DECADRON) 4 mg tablet Take  by mouth every twelve (12) hours.  lactulose (CHRONULAC) 10 gram/15 mL solution Take  by mouth three (3) times daily.  furosemide (LASIX) 20 mg tablet Take  by mouth daily.  oxyCODONE ER (OXYCONTIN) 40 mg ER tablet Take 40 mg by mouth every twelve (12) hours.  metFORMIN (GLUCOPHAGE) 500 mg tablet Take  by mouth two (2) times daily (with meals). No current facility-administered medications for this visit. SYSTEM REVIEW NOT RELATED TO LIVER DISEASE OR REVIEWED ABOVE:  Constitution systems: Negative for fever, chills, weight gain, weight loss. Eyes: Negative for visual changes.   ENT: Negative for sore throat, painful swallowing. Respiratory: Negative for cough, hemoptysis, SOB. Cardiology: Negative for chest pain, palpitations. GI:  Negative for constipation or diarrhea. : Negative for urinary frequency, dysuria, hematuria, nocturia. Skin: Negative for rash. Hematology: Negative for easy bruising, blood clots. Musculo-skelatal: Negative for back pain, muscle pain, weakness. Neurologic: Negative for headaches, dizziness, vertigo, memory problems not related to HE. Psychology: Negative for anxiety, depression. FAMILY HISTORY:  The patient has no knowledge of the father's medical condition. The mother  of CVA. There is no family history of liver disease. SOCIAL HISTORY:  The patient is . The patient has no children. The patient has never used tobacco products. The patient has previously consumed alcohol socially never in excess. The patient has been abstinent from alcohol since 2016. The patient used to work as a . PHYSICAL EXAMINATION:  BP (!) 147/94 (BP 1 Location: Left arm, BP Patient Position: Sitting)  Pulse 96  Temp 98.9 °F (37.2 °C) (Tympanic)   Ht 5' 8\" (1.727 m)  Wt 188 lb 9.6 oz (85.5 kg)  SpO2 98%  BMI 28.68 kg/m2  General: No acute distress. Eyes: Sclera anicteric. ENT: No oral lesions. Thyroid normal.  Nodes: No adenopathy. Skin: No spider angiomata. No jaundice. No palmar erythema. Respiratory: Lungs clear to auscultation. Cardiovascular: Regular heart rate. No murmurs. No JVD. Abdomen: Soft non-tender. Liver size normal to percussion/palpation. Spleen not palpable. No obvious ascites. Extremities: 2+ lower edema bilaterally. No muscle wasting. No gross arthritic changes. Neurologic: Alert and oriented. Left sided weakness. Cranial nerves grossly intact. No asterixis.     LABORATORY STUDIES:  [facility] of [address] [place] [address] & Units 2017   WBC 3.4 - 10.8 x10E3/uL 10.1 9.6   ANC 1.4 - 7.0 x10E3/uL 8.3 (H) 8.5 (H)   HGB 11.1 - 15.9 g/dL 9.6 (L) 9.3 (L)    - 379 x10E3/uL 285 239   INR 0.8 - 1.2 0.9 0.9   AST 0 - 40 IU/L 316 (H) 264 (H)   ALT 0 - 32 IU/L 372 (H) 354 (H)   Alk Phos 39 - 117 IU/L 615 (H) 521 (H)   Bili, Total 0.0 - 1.2 mg/dL 6.0 (H) 5.6 (H)   Bili, Direct 0.00 - 0.40 mg/dL 4.48 (H) 4.17 (H)   Albumin 3.5 - 5.5 g/dL 3.6 3.7   BUN 6 - 20 mg/dL 9 6   Creat 0.57 - 1.00 mg/dL 0.61 0.42 (L)   Na 134 - 144 mmol/L 136 137   K 3.5 - 5.2 mmol/L 4.8 3.9   Cl 96 - 106 mmol/L 94 (L) 93 (L)   CO2 18 - 29 mmol/L 25 26   Glucose 65 - 99 mg/dL 142 (H) 132 (H)   Ammonia ug/dL  CANCELED     SEROLOGIES:  Serologies Latest Ref Rng & Units 5/18/2017 5/5/2017   Hep B Surface Ag Negative  Negative   Hep B Core Ab, Total Negative  Negative   Hep B Surface AB QL   Non Reactive   Hep C Ab 0.0 - 0.9 s/co ratio  <0.1   CORAL, IFA  Negative    C-ANCA Neg:<1:20 titer <1:20    P-ANCA Neg:<1:20 titer <1:20    ANCA Neg:<1:20 titer <1:20    ASMCA 0 - 19 Units 6    M2 Ab 0.0 - 20.0 Units 6.8      LIVER HISTOLOGY:  1/2017. Slides reviewed by MLS. Benign appearing steatosis. >90% macro and micovesicular steatosis. No ballooning. No inflammation. No fibrosis. ENDOSCOPIC PROCEDURES:  Not available or performed    RADIOLOGY:  Not available or performed    OTHER TESTING:  Not available or performed    ASSESSMENT AND PLAN:  Persistent elevation in liver transaminases and alkaline phosphate. This appears to be a drug induced reaction to chemotherapy she is recieving for glioblastoma. She appears to be less jaundiced. Serum albumin is normal.  INR is normal.  The platelet count is normal.      There is no evidence of liver failure. Have performed laboratory testing to monitor liver function and degree of liver injury. This included BMP, hepatic panel, CBC with platelet count, INR. The liver biopsy demonstrates severe steatosis without inflammation or fibrosis.   This is likely an effect from chemotherapy which has caused some mitochondrial injury. She has never been on Valproic acid for seizures which can cause microvesicular steatosis and liver failure. Keppra would be the safest anti-seizure mediation to be on with liver injury. There was a mild elevation in ammonia without any cognitive deficits. The elevation in ammonia is not clinically significant. Bouts of confusion appear to be related to rducing the dose of decedron and worsening cerebral edema. Serologic testing for all causes of liver injury were all negative. If labs are better she can resume chemotherapy. The patient was directed to continue all current medications at the current dosages. There are no contraindications for the patient to take any medications that are necessary for treatment of other medical issues. The patient was counseled regarding alcohol consumption. All of the above issues were discussed with the patient. All questions were answered. The patient expressed a clear understanding of the above. [address] [address] in 4 weeks for monitoring of liver tests. Amalia Leon MD  Liver [place] of [address] [place] [address] [address] Hanna 7  [facility], [facility] 22.  [phone]

## 2017-07-29 LAB
ALBUMIN SERPL-MCNC: 4.1 G/DL (ref 3.5–5.5)
ALP SERPL-CCNC: 459 IU/L (ref 39–117)
ALT SERPL-CCNC: 184 IU/L (ref 0–32)
AST SERPL-CCNC: 182 IU/L (ref 0–40)
BASOPHILS # BLD AUTO: 0 X10E3/UL (ref 0–0.2)
BASOPHILS NFR BLD AUTO: 0 %
BILIRUB DIRECT SERPL-MCNC: 2.27 MG/DL (ref 0–0.4)
BILIRUB SERPL-MCNC: 3.2 MG/DL (ref 0–1.2)
BUN SERPL-MCNC: 6 MG/DL (ref 6–20)
BUN/CREAT SERPL: 13 (ref 9–23)
CALCIUM SERPL-MCNC: 9.3 MG/DL (ref 8.7–10.2)
CHLORIDE SERPL-SCNC: 92 MMOL/L (ref 96–106)
CO2 SERPL-SCNC: 23 MMOL/L (ref 18–29)
CREAT SERPL-MCNC: 0.48 MG/DL (ref 0.57–1)
EOSINOPHIL # BLD AUTO: 0 X10E3/UL (ref 0–0.4)
EOSINOPHIL NFR BLD AUTO: 0 %
ERYTHROCYTE [DISTWIDTH] IN BLOOD BY AUTOMATED COUNT: 25.5 % (ref 12.3–15.4)
GLUCOSE SERPL-MCNC: 193 MG/DL (ref 65–99)
HCT VFR BLD AUTO: 27.1 % (ref 34–46.6)
HGB BLD-MCNC: 8.3 G/DL (ref 11.1–15.9)
IMM GRANULOCYTES # BLD: 0 X10E3/UL (ref 0–0.1)
IMM GRANULOCYTES NFR BLD: 1 %
INR PPP: 1 (ref 0.8–1.2)
LYMPHOCYTES # BLD AUTO: 0.6 X10E3/UL (ref 0.7–3.1)
LYMPHOCYTES NFR BLD AUTO: 11 %
MCH RBC QN AUTO: 29 PG (ref 26.6–33)
MCHC RBC AUTO-ENTMCNC: 30.6 G/DL (ref 31.5–35.7)
MCV RBC AUTO: 95 FL (ref 79–97)
MONOCYTES # BLD AUTO: 0.4 X10E3/UL (ref 0.1–0.9)
MONOCYTES NFR BLD AUTO: 7 %
MORPHOLOGY BLD-IMP: ABNORMAL
NEUTROPHILS # BLD AUTO: 4.4 X10E3/UL (ref 1.4–7)
NEUTROPHILS NFR BLD AUTO: 81 %
PLATELET # BLD AUTO: 145 X10E3/UL (ref 150–379)
POTASSIUM SERPL-SCNC: 3.7 MMOL/L (ref 3.5–5.2)
PROT SERPL-MCNC: 6.8 G/DL (ref 6–8.5)
PROTHROMBIN TIME: 10 SEC (ref 9.1–12)
RBC # BLD AUTO: 2.86 X10E6/UL (ref 3.77–5.28)
SODIUM SERPL-SCNC: 135 MMOL/L (ref 134–144)
WBC # BLD AUTO: 5.3 X10E3/UL (ref 3.4–10.8)

## 2021-01-01 ENCOUNTER — HOME CARE VISIT (OUTPATIENT)
Dept: SCHEDULING | Facility: HOME HEALTH | Age: 41
End: 2021-01-01
Payer: MEDICARE

## 2021-01-01 ENCOUNTER — HOME CARE VISIT (OUTPATIENT)
Dept: HOSPICE | Facility: HOSPICE | Age: 41
End: 2021-01-01
Payer: MEDICARE

## 2021-01-01 ENCOUNTER — HOSPICE ADMISSION (OUTPATIENT)
Dept: HOSPICE | Facility: HOSPICE | Age: 41
End: 2021-01-01
Payer: MEDICARE

## 2021-01-01 VITALS — HEART RATE: 83 BPM | RESPIRATION RATE: 16 BRPM | SYSTOLIC BLOOD PRESSURE: 111 MMHG | DIASTOLIC BLOOD PRESSURE: 67 MMHG

## 2021-01-01 VITALS — SYSTOLIC BLOOD PRESSURE: 139 MMHG | HEART RATE: 84 BPM | DIASTOLIC BLOOD PRESSURE: 79 MMHG | RESPIRATION RATE: 17 BRPM

## 2021-01-01 VITALS
HEART RATE: 78 BPM | DIASTOLIC BLOOD PRESSURE: 68 MMHG | RESPIRATION RATE: 16 BRPM | TEMPERATURE: 98.8 F | SYSTOLIC BLOOD PRESSURE: 140 MMHG

## 2021-01-01 VITALS
RESPIRATION RATE: 17 BRPM | SYSTOLIC BLOOD PRESSURE: 144 MMHG | DIASTOLIC BLOOD PRESSURE: 82 MMHG | TEMPERATURE: 98.1 F | HEART RATE: 71 BPM

## 2021-01-01 VITALS
RESPIRATION RATE: 17 BRPM | TEMPERATURE: 97.9 F | SYSTOLIC BLOOD PRESSURE: 141 MMHG | DIASTOLIC BLOOD PRESSURE: 77 MMHG | HEART RATE: 78 BPM

## 2021-01-01 VITALS — HEART RATE: 75 BPM | DIASTOLIC BLOOD PRESSURE: 80 MMHG | RESPIRATION RATE: 15 BRPM | SYSTOLIC BLOOD PRESSURE: 140 MMHG

## 2021-01-01 VITALS
SYSTOLIC BLOOD PRESSURE: 162 MMHG | RESPIRATION RATE: 17 BRPM | TEMPERATURE: 98.9 F | DIASTOLIC BLOOD PRESSURE: 92 MMHG | HEART RATE: 74 BPM

## 2021-01-01 VITALS
HEIGHT: 69 IN | HEART RATE: 80 BPM | RESPIRATION RATE: 18 BRPM | DIASTOLIC BLOOD PRESSURE: 71 MMHG | TEMPERATURE: 99.6 F | WEIGHT: 169 LBS | OXYGEN SATURATION: 97 % | BODY MASS INDEX: 25.03 KG/M2 | SYSTOLIC BLOOD PRESSURE: 130 MMHG

## 2021-01-01 VITALS
RESPIRATION RATE: 17 BRPM | TEMPERATURE: 98 F | SYSTOLIC BLOOD PRESSURE: 143 MMHG | HEART RATE: 73 BPM | DIASTOLIC BLOOD PRESSURE: 87 MMHG

## 2021-01-01 VITALS — SYSTOLIC BLOOD PRESSURE: 120 MMHG | HEART RATE: 82 BPM | RESPIRATION RATE: 18 BRPM | DIASTOLIC BLOOD PRESSURE: 64 MMHG

## 2021-01-01 PROCEDURE — 0651 HSPC ROUTINE HOME CARE

## 2021-01-01 PROCEDURE — G0300 HHS/HOSPICE OF LPN EA 15 MIN: HCPCS

## 2021-01-01 PROCEDURE — HOSPICE MEDICATION HC HH HOSPICE MEDICATION

## 2021-01-01 PROCEDURE — G0299 HHS/HOSPICE OF RN EA 15 MIN: HCPCS

## 2021-01-01 PROCEDURE — G0156 HHCP-SVS OF AIDE,EA 15 MIN: HCPCS

## 2021-01-01 PROCEDURE — G0155 HHCP-SVS OF CSW,EA 15 MIN: HCPCS

## 2021-01-01 PROCEDURE — 3336500001 HSPC ELECTION

## 2021-10-30 NOTE — HOSPICE
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 Su Penn  Primary Care Physician:     Admitting RN:  Allyssa Perez RN  Nurse CM:  Odilia Jose RN  : Sayra Oliveira  :  Carmen Norwood DNR:  Yes - signed on admission   Service:   No         Home: not discussed    Hospice Summary:    ER Visits/ Hospitalizations in past year:   Onset Date of Hospice Diagnosis:   Summary of Disease Progression Leading to Hospice Diagnosis:  Excerpt from [facility] discharge note AKUA CEBALLOS Jorge Fry on 2021: \"Sagrario is a 36 yo F w/ GBM (diagnosed 6 years ago s/p resection and radiation), seizures 2/2 GBM on Keppra and carbamazepine, DM on metformin, and a hx of residual left sided weakness (usually 3/5 for distal UE and 4/5 for distal LE per chart review) who presents as a stroke alert due to slurring and increased weakness on the left side. Per grandmother patient developed symptoms around 1400 yesterday with LWN at 14:00 on 21. At that time they thought it would go away which is why they did not bring her to the ED. Her BP upon initial eval was 148/85 with a BG of 122.  NIHSS was a 12 for left UE no antigravity movement and left LE no antigravity movement along with dysarthria, aphasia, and questions not correctly answered. She is not a tPA candidate due to her LWN. 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 Seizure semiology: weird feeling on the L side of her neck, stiffness in neck then arm then occasionally contralaterally. Last 30 sec -1 min. Occur usually every other day. Of note she did have a fall 2 weeks ago due to which she developed bruising on her face however was not taken to the ED or medical provider for evaluation. \" Mukul Ibarra is a 36 yo F w/ GBM (diagnosed 6 years ago s/p resection and radiation), seizures 2/2 GBM on Keppra and carbamazepine, DM on metformin, and a hx of residual left sided weakness (usually 3/5 for distal UE and 4/5 for distal LE per chart review) who presents as a stroke alert due to slurring and increased weakness on the left side. LWN is 14:00 on 9/8 and initial NIHSS was 12 due to drift, dysarthria, aphasia, and lack of answering questions correctly. Her examination does not localize and she is not a tPA candidate 2/2 LWN. Concern at this time given hx and uncontrolled seizures Clinically, Ms. Contreras seizures are still suboptimally controlled. Radiographically there is no evidence of recurrent malignant glioma more than five years from diagnosis, only persistent radiation necrosis which could certainly account for the persistent seizure activity. Narrative:  Very pleasant lady. Sitting up in bed. Has multiple issues r/t hx of glioblastoma and radiation. Dysarthric - struggles for words but thought process very clear. Has seizures that family report present as mild tonic clonic and are usually short in duration. Left sided weakness. On a good day is able to ambulate a few feet using walker. Still has good appetite and is able to feed self. Ate a hamburger and fries during visit.   Denies c/o pain except for occasional headache which is controlled with Tylenol. Occasional constipation. Last BM yesterday. Has been dealing with physical declines with positive attitude. Became very thoughtful and a little teary as she expressed understanding of Hospice and her prognosis. Use LCD Guidelines and list features:   Cancer Diagnoses     ___X_____  A. Disease with distant metastases at presentation OR    ___X_____  B. Progression from an earlier stage of disease to metastatic disease with either:                          ___X_____  1. continued decline in spite of therapy                          ________  2. patient declines further disease directed therapy    SPIRITUAL/Social/Emotional:                  Psych/ Social/ Emotional Issues Identified: Mother  at 55 yo d/t brainstem hemorrhage. Father is alive. Maternal grandmother with breast cancer (currently in remission)     __ACACIA Hodges ____contacted, agrees to serve as attending provider for hospice and provided verbal certification of terminal illness with prognosis of 6 months or less life expectancy. Orders for hospice admission, medications and plan of treatment received. Medication reconciliation completed.     Currently this patient has:  MEDS:  I have reviewed the patient's medication list with MD and identified the following:  Nonformulary medications:   Unrelated medications: Lisinopril, metoprolol, metformin and glipizide (per Dr. Montse Hodges)

## 2021-11-01 NOTE — HOSPICE
Ms Sandy Sinclair was sitting on the side of bed when nurse arrived, she denied any pain or shortness of breath at time of visit. Ms Sandy Sinclair stated she takes tylenol which helps with her pain if she has pain. Her chief complaint was not being able to hear form her right ear. Ms Sandy Sinclair denied any headaches or ear pain but stated ear feel clogged. unable to fully assess ear during visit but  no drainage or signs of redness noted o partial assessment. after assessing patient. this nurse and Cleo Bamberger went downstairs to review hospice philosophy, goals and providing comfort, SKR medications have not arrived but discussion regarding medication dosage, indication for use were reviewed with Vesta. reviewed hospice aide care plan and visit frequency,. [place] would like for sn to start 1 x week than increase as needed.  [place] verbalized understanding of calling hospice with questions, concerns or changes in patient status

## 2021-11-03 NOTE — HOSPICE
MSW visit declined for the first five days of care, patient's aunt shared that they are doing okay, would like visit later next week. MSW to call next week to schedule visit.

## 2021-11-04 NOTE — HOSPICE
Visit made to follow up on nosebleed. Bleeding subsided yesterday, 11/2. Patient reports nasal congestion and feel as if there is gauze lodged in her right side nose. She states there may be gauze in right side of nose, but she is unsure. Mucous and dried blood are visible in both nares. Contacted Dr. Otis Hawkins and ordered to remove gauze from nose, moistening with saline first.  Additional order for nasal saline and Afrin-type nasal spray. Applied copious amount of saline to both nares in soaked gauze and by dropper. Removed some blood and mucous from both nares. Visible areas of nasal passages are narrower on right side with no obvious signs of gauze or other foreign matter. PAtient also gently blew her nose and removed small amounts of mucous and moistened blood. Instructed patient in use of saline and nasal spray and instructed them to call with any other changes or developments. - she and her mother verbalized understanding.     NEW MEDICATION INITIATION DOCUMENTATION:  Consulted AT MD to report change in patient status: YES  Obtained Order from Provider for initiation of Symptom relief medication / other medication needed:YES   Documentation completed in Telephone/Visit Note in [facility]  Reason medication is being initiated:new symptom  MD / Provider name consulted re: change in status / initiation of new Erick Martin MD  New Symptom(s): nasal dryness  New Order(s): sodium chloride/aloe vera gel - apply 1 squirt to each nostril as needed for dryness  Name of person being taught: Gabriela Mccord  Instructions given: YES  Side Effects taught:YES  Response to teaching: verbalized understanding    NEW MEDICATION INITIATION DOCUMENTATION:  Consulted AT MD to report change in patient status: YES  Obtained Order from Provider for initiation of Symptom relief medication / other medication needed:YES   Documentation completed in Telephone/Visit Note in [facility]  Reason medication is being initiated:new symptom  MD / Provider name consulted re: change in status / initiation of new Elmer Flores MD  New Symptom(s): nasal congestion  New Order(s): oxymetazoline hydrochloride 0.05% - 2 pumps to each nostril twice daily  Name of person being taught: Godfrey Pittman  Instructions given: YES  Side Effects taught:YES  Response to teaching: verbalized understanding    NCD document provided and signed for, sent to consents email address

## 2021-11-04 NOTE — HOSPICE
Problem is spiritual distress related to awareness of functional decline. Ms. Sarah Raza verbalized awareness that she's no longer able to read or write. Goal is to hear and acknowledge distress and offer support based on Ms. Contreras's spiritual beliefs. Ms. Sarah Raza shared that she had been actively involved in her Anabaptism. However, because of Covid and her illness, Ms. Sarah Raza has not been able to [employer] in person. Furthermore, her   from [place]. Because of these events, Ms. Sarah Raza feels disconnected from her Voodoo. Ms. Sarah Raza now finds it difficult to read Scripture due to her functional decline. She expressed sadness in not being able to read from her Bible. The hospice chaplain offered to read from her [facility]. Reading from the Book of Robert, the hospice chaplain described the need for spiritual strength in times of crisis. This passage was one that Ms. Sarah Raza had previously highlighted. She responded positively to the reading by smiling. The hospice chaplain reminded her that she was God's beloved. Using the words from Lindy 7' Sabianism, the hospice chaplain reminded Ms. Sarah Raza that would always be God's beloved regardless of life's circumstances. Ms. Sarah Raza thanked the  for visiting and providing needed spiritual support. The plan for the next two weeks is to respond to Ms. Contreras's request for additional spiritual support.

## 2021-11-09 NOTE — HOSPICE
MSW made initial visit with patient, aunt, Estella Mandujano, and grandmother, Florian Damon. MSW met with patient's aunt and grandmother first. They shared that they have been coping appropriately so far with patient's grandmother being the primary caregiver and the aunt helping as she is able, but notes that she still works outside the home Tues-Thursday. Aunt shared that they have applied for Medicaid, but had a spenddown. MSW talked to aunt about long term care Medicaid and asked if they were trying to get help in place, aunt shared that they would consider it if patient can get Medicaid. MSW educated on a friend or family member being the aide or hiring an aide through an agency. Patient's aunt shared family will think about it more while MSW assist with requesting a U[employer] screening and reaching out to [address] to follow up on patient's Medicaid status (patient has partial coverage at present). MSW talked to patient's aunt about how family is coping; aunt shared that patient's mother passed away in 2014, 2 years before patient was diagnosed with glioblastoma. Aunt shared that her mother is having a difficult time accepting that her first born child had an untimely death and her first born granddaughter is nearing end of life. Aunt shared that she is coping as best she can. Aunt also informed that patient has struggled with her illness and continued decline. MSW talked to aunt about their support system, aunt shared that the family is its own support system and that they have strong harris and Adventism support. MSW provided supportive counseling. Patient's aunt shared that she has to continue to work and is considering getting a part time job. MSW educated aunt about hospice foundation assistance; aunt would like to apply for support. MSW will send a FAP application to aunt to complete. MSW visited with patient who shared that she is doing okay today. Patient observed sitting up in bed eating her lunch.  Patient alert and oriented x 4, but struggled to express her thoughts at times. Patient appreciative of visit and open to MSW visits for supportive counseling. Patient shared that she has difficulty with having positive thoughts due to her surgery. Patient noted that she talks to her aunt but looks forward to having someone else to talk to and not burdening her aunt with everything.

## 2021-11-09 NOTE — HOSPICE
Patient found sitting up in her bed playing a coloring game on her phone. Patient denies c/o pain or SOB, often becomes frustrated r/t expressive aphasia. Patient does her best to participate in this visit while providing some history. Patient reports she may have right ear wax build up. Patient's grandmother Ms. Alsye Handy and aunt Ms. Renée Andrews reports they purchased Debrox ear gtts and have been inserted for 2 days. Patient's family state they will continue these ear drops for a few more days but understand this may be a symptom caused by patient's brain tumor. Ms. Alyse Handy requests a pill box to be filled once patient completes her current prepackaged medications. Patient denies nosebleeds since Saturday. She has a bedside commode next to her bed and walker. This nurse provides a pill box from car stock.   Follow up for next visit: Afrin and nasal gel delivery  NEW MEDICATION INITIATION DOCUMENTATION:  Consulted AT MD to report change in patient status: {YES  Obtained Order from Provider for initiation of Symptom relief medication / other medication needed:{YES  Documentation completed in Visit Note in [facility]  Reason medication is being initiated: ear wax  MD / Provider name consulted re: change in status / initiation of new medication: NP Jose Grimaldo Symptom(s): ear wax and trouble hearing  New Order(s): Debrox  Name of person being taught: patient, aunt, and grandmother  Instructions given: {YES- five drops right ear twice daily x 5 days  Side Effects taught:{YES side effects are rare, allergic reaction would cause rash, swollen tongue, and trouble breathing  Response to teaching: patient and family verbalize understanding

## 2021-11-15 NOTE — HOSPICE
Received by patient's Aunt and Grandmother and discussed hopsice care plan with them. Pt received sitting upright in bed and eating her lunch and states she feels good today. She denies any pain and states she is eating very well. Pt states daily BMs and are soft formed. Pt has eczema and asked about topical medication. Aunt states that ear drops given before did not help her ear blockage. JUANIS Del Rio called and [facility] for cortisporin and topical steroid received and ordered. Medications and supplies needed, ordered. Patient and family instructed that hospice is avialable 24/7 and they verbalize understanding.

## 2021-11-22 NOTE — HOSPICE
Patient sitting on side or bed, alert and oriented x3, with periods of forgetfulness. Patient denies pain or sob at this time. She reports headache at time, taking tyleonl with good results per patient. Patient reports right ear discomfort, she reports she has not receive ear drops that were ordered. Caregiver confrim drop not delivered, This nurse will follow up and family will  from RX. No other concerns voiced.  Encourage to call with any questions or concerns

## 2021-11-27 NOTE — HOSPICE
MSW made visit with patient and family. MSW talked to patient's aunt who shared that she and family are dong okay, preparing for Thanksgiving. Aunt shared that they are having some financial concerns and MSW provided FAP application on this visit. Aunt will fill out and let MSW know when it is complete. MSW met with patient in her room. Patient shared that she continues to have a difficult time with focusing on positive things as a result of her tumor. Patient shared that she has issues with eating, she wants to eat, but food is not sitting well for her. Patient also noted that she keeps smelling a bad smell and cannot figure out how to make it go away. MSW noted that there is no noticeable smell in the room. MSW talked with patient's aunt and grandmother and they both note that patient has been saying this, but they also do not smell anything when she shares this. MSW shared that she will talk with RN to address these concerns. Patient appreciative of visit and having a different person to talk to. MSW will continue to provide emotional support to patient and family.

## 2021-11-29 NOTE — HOSPICE
Pt received sitting up in bed and eating breakfast. Pt denies any pain and states she is eating well. Grandmother states pt had normal BM yesterday and is having regular BMs. Grandmother states pt fell last week while transfering to bedside commode. Pt has healing abrasion and bruise to left hip. Pt denies pain to this area but is weak side from CVA. Pt and grandmother instructed to have patient get assistance with transfers to bedside commode. Grandmother also states pt having less nosebleeds and recently had gauze that was stuck in her nose, come out when blowing her nose. Pt states her right ear is getting better. Patient and grandmother instructed that hospice is available 24/7 and they verballize understanding.

## 2021-12-06 NOTE — HOSPICE
Upon arrival patient sitting upright in her bed, denies pain and SOB. Complains of vaginal itching without odor or vaginal discharge x2 days, and complains of left hand 5 th digit nail and bilateral great toenail fungus. Dede Melo NP notifed with new order for Difulan x3 days for vaginal itching, No new orders for fungal nail to left hand and bilateral great toes att this time. Nurse provided re-eduction upon hand and nail care, with emphasis upon bathing hands and feet daily with soap and water, and ensuring to dry throughly between fingers and toes. Patient and caregivers Denilson Moreland and Vesta expressed understanding. Medication ordered for delivery from [address], spoke with pharmacist Tawana Costello.      NEW MEDICATION INITIATION DOCUMENTATION:  Consulted AT MD to report change in patient status: YES  Obtained Order from Provider for initiation of Symptom relief medication / other medication needed:YES   Documentation completed in Telephone/Visit Note in [facility]  Reason medication is being initiated:vaginal itching  MD / Provider name consulted re: change in status / initiation of new medication:Mare Faith NP  New Symptom(s): vaginal itching  New Order(s): Diflucan 150mg PO daily x3 days, starting 12/7/21-12/9/21  Name of person being taught: Blanka Mcbride, caregivers Denilson Moreland and [place]  Instructions given: YES  Side Effects taught:YES  Response to teaching: Verbalized understanding

## 2021-12-13 NOTE — HOSPICE
Patient received sitting up in bed eating lunch and in NAD. Pt denies any pain. Pt's Aunt states pt had another fall Thursday eveneing and has bruise to lt wrist/hand area. This nurse instructed family that patient will probably continues to have falls unless she gets side-rails for patient's bed. Family declines and states that patient gets up in the middle of the night and transfers to bedside commode and she will be unable to do so, if side-rails in place. Pt has no feeling to left side of her body and denies any pain. Aunt states patient is eating well and has daily BMs. Aunt and grandmother deny needing any supplies or medications. Patient and family instructed that hospice is avialable 24/7 ad they verbalize understanding.

## 2021-12-15 NOTE — HOSPICE
MSW made routine visit with patient and family. Patient observed sitting up in her bedroom playing on her phone when MSW arrived. Patient observed to be in better spirits on this visit, smiling more that on previous visit. Patient shared that she had a good Thanksgiving and enjoyed the day with her family. Patient continues to struggle getting words out at times, and fully expressing herself, but managed okay. MSW provided emotional support through active listening as patient shared in life review and socialization. Patient expressed her appreciation for MSW support and visit, enjoys that she has someone other than family to talk to. Patient also shared that she figured out that the bad smell that she had been smelling was a piece of guaze that had been stuck in her nose for several weeks; but noted that she was able to get it out and the smell is gone. MSW also met with patient's aunt and provided emotional support through active listening as she shared in life review and talked about their holiday and how happy family was to have patient sitting with them for the whole day in the downstairs of the home. Aunt shared that she has not had a chance to complete FAP application. MSW shared there is no rush, that it can be completed when family is ready. Aunt shared that finances are getting tight and she has gotten a new part time job. MSW encouraged patient's aunt to let MSW know if/when support is needed and MSW will try to assist in finding additional resources. Family appreciative of visit and support provided.

## 2021-12-21 NOTE — HOSPICE
Ms Lady Andrews was sitting up in bed eating a hamburger and french fries when nurse arrive  no apparent or acute distress observed during visit  Ms Lady Andrews was without complaints of pain or respiratory distress  sn assessment completed no significant changes, issues or concerns voiced by pt or her grandmother  encouraged calling hospice with questions, concerns or changes in pt status

## 2021-12-27 NOTE — HOSPICE
Pt received sitting up in bed and in NAD and is alert x4. Pt's Aunt states patient just woke up 30 minutes ago. Pt states she is feeling weak and tired. Pt states she is eating well and has regular, Daily BMs. Pt's grandmother states patient has not fallen again. Lungs clear to auscultation and bowel sounds are normal. Patient and family instructed that hospice is avialable 24/7 and they verbalize understanding.

## 2022-01-01 ENCOUNTER — HOME CARE VISIT (OUTPATIENT)
Dept: HOSPICE | Facility: HOSPICE | Age: 42
End: 2022-01-01
Payer: MEDICARE

## 2022-01-01 ENCOUNTER — HOME CARE VISIT (OUTPATIENT)
Dept: SCHEDULING | Facility: HOME HEALTH | Age: 42
End: 2022-01-01
Payer: MEDICARE

## 2022-01-01 ENCOUNTER — HOSPITAL ENCOUNTER (INPATIENT)
Age: 42
LOS: 6 days | End: 2022-05-12
Attending: FAMILY MEDICINE | Admitting: FAMILY MEDICINE

## 2022-01-01 ENCOUNTER — HOSPITAL ENCOUNTER (INPATIENT)
Age: 42
LOS: 5 days | Discharge: HOME | End: 2022-04-04
Attending: FAMILY MEDICINE | Admitting: FAMILY MEDICINE

## 2022-01-01 ENCOUNTER — HOSPITAL ENCOUNTER (EMERGENCY)
Age: 42
Discharge: HOME | End: 2022-02-24
Attending: STUDENT IN AN ORGANIZED HEALTH CARE EDUCATION/TRAINING PROGRAM | Admitting: STUDENT IN AN ORGANIZED HEALTH CARE EDUCATION/TRAINING PROGRAM
Payer: MEDICARE

## 2022-01-01 ENCOUNTER — HOSPITAL ENCOUNTER (INPATIENT)
Age: 42
LOS: 5 days | Discharge: HOME | End: 2022-02-18
Attending: INTERNAL MEDICINE | Admitting: INTERNAL MEDICINE

## 2022-01-01 VITALS
SYSTOLIC BLOOD PRESSURE: 122 MMHG | RESPIRATION RATE: 16 BRPM | DIASTOLIC BLOOD PRESSURE: 68 MMHG | HEART RATE: 90 BPM | TEMPERATURE: 99 F

## 2022-01-01 VITALS
TEMPERATURE: 98.7 F | SYSTOLIC BLOOD PRESSURE: 115 MMHG | DIASTOLIC BLOOD PRESSURE: 71 MMHG | HEART RATE: 88 BPM | RESPIRATION RATE: 17 BRPM

## 2022-01-01 VITALS
OXYGEN SATURATION: 97 % | RESPIRATION RATE: 18 BRPM | DIASTOLIC BLOOD PRESSURE: 60 MMHG | HEART RATE: 86 BPM | SYSTOLIC BLOOD PRESSURE: 106 MMHG | TEMPERATURE: 99 F

## 2022-01-01 VITALS
RESPIRATION RATE: 18 BRPM | SYSTOLIC BLOOD PRESSURE: 118 MMHG | HEART RATE: 86 BPM | DIASTOLIC BLOOD PRESSURE: 58 MMHG | TEMPERATURE: 98 F

## 2022-01-01 VITALS
DIASTOLIC BLOOD PRESSURE: 82 MMHG | SYSTOLIC BLOOD PRESSURE: 131 MMHG | RESPIRATION RATE: 16 BRPM | HEART RATE: 81 BPM | TEMPERATURE: 98.3 F

## 2022-01-01 VITALS
RESPIRATION RATE: 18 BRPM | DIASTOLIC BLOOD PRESSURE: 68 MMHG | TEMPERATURE: 99 F | SYSTOLIC BLOOD PRESSURE: 130 MMHG | HEART RATE: 88 BPM

## 2022-01-01 VITALS
RESPIRATION RATE: 18 BRPM | TEMPERATURE: 98.5 F | DIASTOLIC BLOOD PRESSURE: 68 MMHG | SYSTOLIC BLOOD PRESSURE: 130 MMHG | HEART RATE: 86 BPM

## 2022-01-01 VITALS
DIASTOLIC BLOOD PRESSURE: 64 MMHG | HEART RATE: 80 BPM | RESPIRATION RATE: 18 BRPM | SYSTOLIC BLOOD PRESSURE: 126 MMHG | TEMPERATURE: 99 F

## 2022-01-01 VITALS
TEMPERATURE: 98 F | SYSTOLIC BLOOD PRESSURE: 131 MMHG | RESPIRATION RATE: 18 BRPM | DIASTOLIC BLOOD PRESSURE: 77 MMHG | HEART RATE: 82 BPM

## 2022-01-01 VITALS
HEART RATE: 113 BPM | SYSTOLIC BLOOD PRESSURE: 137 MMHG | DIASTOLIC BLOOD PRESSURE: 40 MMHG | RESPIRATION RATE: 16 BRPM | OXYGEN SATURATION: 84 % | TEMPERATURE: 100 F

## 2022-01-01 VITALS
HEART RATE: 96 BPM | SYSTOLIC BLOOD PRESSURE: 130 MMHG | TEMPERATURE: 97.5 F | DIASTOLIC BLOOD PRESSURE: 68 MMHG | OXYGEN SATURATION: 97 % | RESPIRATION RATE: 16 BRPM

## 2022-01-01 VITALS
RESPIRATION RATE: 16 BRPM | DIASTOLIC BLOOD PRESSURE: 62 MMHG | HEART RATE: 88 BPM | SYSTOLIC BLOOD PRESSURE: 130 MMHG | TEMPERATURE: 98.6 F

## 2022-01-01 VITALS
RESPIRATION RATE: 16 BRPM | SYSTOLIC BLOOD PRESSURE: 140 MMHG | DIASTOLIC BLOOD PRESSURE: 70 MMHG | TEMPERATURE: 97.7 F | HEART RATE: 90 BPM

## 2022-01-01 VITALS
DIASTOLIC BLOOD PRESSURE: 86 MMHG | TEMPERATURE: 98.6 F | RESPIRATION RATE: 16 BRPM | SYSTOLIC BLOOD PRESSURE: 130 MMHG | HEART RATE: 80 BPM

## 2022-01-01 VITALS
SYSTOLIC BLOOD PRESSURE: 130 MMHG | DIASTOLIC BLOOD PRESSURE: 76 MMHG | RESPIRATION RATE: 17 BRPM | HEART RATE: 81 BPM | TEMPERATURE: 97.9 F

## 2022-01-01 VITALS — HEART RATE: 94 BPM | SYSTOLIC BLOOD PRESSURE: 130 MMHG | RESPIRATION RATE: 18 BRPM | DIASTOLIC BLOOD PRESSURE: 78 MMHG

## 2022-01-01 VITALS
DIASTOLIC BLOOD PRESSURE: 78 MMHG | TEMPERATURE: 98.7 F | HEART RATE: 83 BPM | SYSTOLIC BLOOD PRESSURE: 124 MMHG | RESPIRATION RATE: 16 BRPM

## 2022-01-01 VITALS
RESPIRATION RATE: 16 BRPM | HEART RATE: 78 BPM | SYSTOLIC BLOOD PRESSURE: 129 MMHG | TEMPERATURE: 98 F | DIASTOLIC BLOOD PRESSURE: 58 MMHG

## 2022-01-01 VITALS
DIASTOLIC BLOOD PRESSURE: 80 MMHG | TEMPERATURE: 98.3 F | SYSTOLIC BLOOD PRESSURE: 128 MMHG | HEART RATE: 82 BPM | RESPIRATION RATE: 18 BRPM

## 2022-01-01 VITALS — SYSTOLIC BLOOD PRESSURE: 122 MMHG | DIASTOLIC BLOOD PRESSURE: 70 MMHG | HEART RATE: 88 BPM | RESPIRATION RATE: 18 BRPM

## 2022-01-01 VITALS
SYSTOLIC BLOOD PRESSURE: 126 MMHG | DIASTOLIC BLOOD PRESSURE: 82 MMHG | TEMPERATURE: 97.8 F | HEART RATE: 84 BPM | RESPIRATION RATE: 17 BRPM

## 2022-01-01 VITALS
TEMPERATURE: 98.6 F | SYSTOLIC BLOOD PRESSURE: 138 MMHG | RESPIRATION RATE: 18 BRPM | OXYGEN SATURATION: 95 % | DIASTOLIC BLOOD PRESSURE: 90 MMHG | HEART RATE: 99 BPM

## 2022-01-01 VITALS
DIASTOLIC BLOOD PRESSURE: 68 MMHG | HEART RATE: 88 BPM | SYSTOLIC BLOOD PRESSURE: 130 MMHG | TEMPERATURE: 99 F | RESPIRATION RATE: 18 BRPM

## 2022-01-01 VITALS
HEART RATE: 101 BPM | RESPIRATION RATE: 17 BRPM | SYSTOLIC BLOOD PRESSURE: 131 MMHG | DIASTOLIC BLOOD PRESSURE: 55 MMHG | TEMPERATURE: 102.4 F

## 2022-01-01 VITALS
HEART RATE: 90 BPM | DIASTOLIC BLOOD PRESSURE: 70 MMHG | RESPIRATION RATE: 18 BRPM | SYSTOLIC BLOOD PRESSURE: 122 MMHG | TEMPERATURE: 97.4 F

## 2022-01-01 VITALS
OXYGEN SATURATION: 98 % | DIASTOLIC BLOOD PRESSURE: 78 MMHG | HEART RATE: 83 BPM | RESPIRATION RATE: 18 BRPM | SYSTOLIC BLOOD PRESSURE: 132 MMHG | TEMPERATURE: 97.3 F

## 2022-01-01 VITALS
DIASTOLIC BLOOD PRESSURE: 88 MMHG | SYSTOLIC BLOOD PRESSURE: 151 MMHG | HEART RATE: 98 BPM | TEMPERATURE: 98 F | RESPIRATION RATE: 16 BRPM | OXYGEN SATURATION: 95 %

## 2022-01-01 VITALS
DIASTOLIC BLOOD PRESSURE: 55 MMHG | SYSTOLIC BLOOD PRESSURE: 102 MMHG | RESPIRATION RATE: 17 BRPM | TEMPERATURE: 97.6 F | HEART RATE: 76 BPM

## 2022-01-01 VITALS
OXYGEN SATURATION: 98 % | DIASTOLIC BLOOD PRESSURE: 68 MMHG | TEMPERATURE: 99.1 F | HEART RATE: 94 BPM | RESPIRATION RATE: 22 BRPM | SYSTOLIC BLOOD PRESSURE: 120 MMHG

## 2022-01-01 VITALS
RESPIRATION RATE: 16 BRPM | SYSTOLIC BLOOD PRESSURE: 131 MMHG | DIASTOLIC BLOOD PRESSURE: 85 MMHG | HEART RATE: 79 BPM | TEMPERATURE: 98.3 F

## 2022-01-01 VITALS — DIASTOLIC BLOOD PRESSURE: 72 MMHG | HEART RATE: 74 BPM | SYSTOLIC BLOOD PRESSURE: 140 MMHG | RESPIRATION RATE: 18 BRPM

## 2022-01-01 VITALS
SYSTOLIC BLOOD PRESSURE: 133 MMHG | RESPIRATION RATE: 16 BRPM | DIASTOLIC BLOOD PRESSURE: 63 MMHG | TEMPERATURE: 98 F | HEART RATE: 78 BPM

## 2022-01-01 VITALS
TEMPERATURE: 98.5 F | DIASTOLIC BLOOD PRESSURE: 89 MMHG | SYSTOLIC BLOOD PRESSURE: 147 MMHG | HEART RATE: 72 BPM | RESPIRATION RATE: 16 BRPM

## 2022-01-01 VITALS
TEMPERATURE: 97.9 F | HEART RATE: 82 BPM | RESPIRATION RATE: 18 BRPM | DIASTOLIC BLOOD PRESSURE: 79 MMHG | SYSTOLIC BLOOD PRESSURE: 132 MMHG

## 2022-01-01 VITALS
SYSTOLIC BLOOD PRESSURE: 128 MMHG | DIASTOLIC BLOOD PRESSURE: 60 MMHG | HEART RATE: 78 BPM | TEMPERATURE: 98.8 F | RESPIRATION RATE: 16 BRPM

## 2022-01-01 DIAGNOSIS — R06.02 SHORTNESS OF BREATH: ICD-10-CM

## 2022-01-01 DIAGNOSIS — Z71.89 GOALS OF CARE, COUNSELING/DISCUSSION: ICD-10-CM

## 2022-01-01 DIAGNOSIS — R41.0 CONFUSION: ICD-10-CM

## 2022-01-01 DIAGNOSIS — R56.9 SEIZURES (HCC): ICD-10-CM

## 2022-01-01 DIAGNOSIS — F41.9 ANXIETY: ICD-10-CM

## 2022-01-01 DIAGNOSIS — M79.2 NEUROPATHIC PAIN: ICD-10-CM

## 2022-01-01 DIAGNOSIS — Z51.5 HOSPICE CARE: ICD-10-CM

## 2022-01-01 DIAGNOSIS — R04.0 EPISTAXIS: Primary | ICD-10-CM

## 2022-01-01 DIAGNOSIS — R45.1 RESTLESSNESS AND AGITATION: ICD-10-CM

## 2022-01-01 DIAGNOSIS — R53.0 NEOPLASTIC (MALIGNANT) RELATED FATIGUE: ICD-10-CM

## 2022-01-01 DIAGNOSIS — C71.9 GLIOBLASTOMA (HCC): ICD-10-CM

## 2022-01-01 DIAGNOSIS — R41.89 DECREASED RESPONSIVENESS: ICD-10-CM

## 2022-01-01 DIAGNOSIS — G89.3 CANCER RELATED PAIN: ICD-10-CM

## 2022-01-01 PROCEDURE — G0156 HHCP-SVS OF AIDE,EA 15 MIN: HCPCS

## 2022-01-01 PROCEDURE — 74011250637 HC RX REV CODE- 250/637: Performed by: INTERNAL MEDICINE

## 2022-01-01 PROCEDURE — 74011250636 HC RX REV CODE- 250/636: Performed by: FAMILY MEDICINE

## 2022-01-01 PROCEDURE — G0299 HHS/HOSPICE OF RN EA 15 MIN: HCPCS

## 2022-01-01 PROCEDURE — 0651 HSPC ROUTINE HOME CARE

## 2022-01-01 PROCEDURE — HOSPICE MEDICATION HC HH HOSPICE MEDICATION

## 2022-01-01 PROCEDURE — 30903 CONTROL OF NOSEBLEED: CPT

## 2022-01-01 PROCEDURE — 99233 SBSQ HOSP IP/OBS HIGH 50: CPT | Performed by: FAMILY MEDICINE

## 2022-01-01 PROCEDURE — 0656 HSPC GENERAL INPATIENT

## 2022-01-01 PROCEDURE — 74011250637 HC RX REV CODE- 250/637: Performed by: FAMILY MEDICINE

## 2022-01-01 PROCEDURE — 74011250636 HC RX REV CODE- 250/636: Performed by: INTERNAL MEDICINE

## 2022-01-01 PROCEDURE — 74011000250 HC RX REV CODE- 250: Performed by: FAMILY MEDICINE

## 2022-01-01 PROCEDURE — 0655 HSPC INPATIENT RESPITE

## 2022-01-01 PROCEDURE — G0300 HHS/HOSPICE OF LPN EA 15 MIN: HCPCS

## 2022-01-01 PROCEDURE — G0155 HHCP-SVS OF CSW,EA 15 MIN: HCPCS

## 2022-01-01 PROCEDURE — 74011250637 HC RX REV CODE- 250/637: Performed by: NURSE PRACTITIONER

## 2022-01-01 PROCEDURE — 99233 SBSQ HOSP IP/OBS HIGH 50: CPT | Performed by: INTERNAL MEDICINE

## 2022-01-01 PROCEDURE — 99357 PR PROLONGED SVC I/P REQ UNIT/FLOOR TIME EA 30 MIN: CPT | Performed by: INTERNAL MEDICINE

## 2022-01-01 PROCEDURE — 74011250636 HC RX REV CODE- 250/636: Performed by: NURSE PRACTITIONER

## 2022-01-01 PROCEDURE — 74011250637 HC RX REV CODE- 250/637: Performed by: STUDENT IN AN ORGANIZED HEALTH CARE EDUCATION/TRAINING PROGRAM

## 2022-01-01 PROCEDURE — 99283 EMERGENCY DEPT VISIT LOW MDM: CPT

## 2022-01-01 RX ORDER — METFORMIN HYDROCHLORIDE 500 MG/1
500 TABLET ORAL 2 TIMES DAILY WITH MEALS
Status: DISCONTINUED | OUTPATIENT
Start: 2022-01-01 | End: 2022-01-01 | Stop reason: HOSPADM

## 2022-01-01 RX ORDER — LORAZEPAM 2 MG/ML
2 INJECTION INTRAMUSCULAR
Status: DISCONTINUED | OUTPATIENT
Start: 2022-01-01 | End: 2022-01-01

## 2022-01-01 RX ORDER — LISINOPRIL 5 MG/1
10 TABLET ORAL DAILY
Status: DISCONTINUED | OUTPATIENT
Start: 2022-01-01 | End: 2022-01-01 | Stop reason: HOSPADM

## 2022-01-01 RX ORDER — HYDROMORPHONE HYDROCHLORIDE 2 MG/ML
2 INJECTION, SOLUTION INTRAMUSCULAR; INTRAVENOUS; SUBCUTANEOUS
Status: DISCONTINUED | OUTPATIENT
Start: 2022-01-01 | End: 2022-01-01

## 2022-01-01 RX ORDER — GLYCOPYRROLATE 0.2 MG/ML
0.2 INJECTION INTRAMUSCULAR; INTRAVENOUS EVERY 4 HOURS
Status: DISCONTINUED | OUTPATIENT
Start: 2022-01-01 | End: 2022-01-01 | Stop reason: HOSPADM

## 2022-01-01 RX ORDER — LORAZEPAM 2 MG/ML
1 INJECTION INTRAMUSCULAR
Status: DISCONTINUED | OUTPATIENT
Start: 2022-01-01 | End: 2022-01-01

## 2022-01-01 RX ORDER — HYDROMORPHONE HYDROCHLORIDE 2 MG/ML
4 INJECTION, SOLUTION INTRAMUSCULAR; INTRAVENOUS; SUBCUTANEOUS
Status: DISCONTINUED | OUTPATIENT
Start: 2022-01-01 | End: 2022-01-01

## 2022-01-01 RX ORDER — EZETIMIBE 10 MG/1
10 TABLET ORAL DAILY
Status: DISCONTINUED | OUTPATIENT
Start: 2022-01-01 | End: 2022-01-01 | Stop reason: HOSPADM

## 2022-01-01 RX ORDER — AMOXICILLIN 500 MG/1
500 TABLET, FILM COATED ORAL 2 TIMES DAILY
Qty: 10 TABLET | Refills: 0 | Status: SHIPPED | OUTPATIENT
Start: 2022-01-01 | End: 2022-01-01

## 2022-01-01 RX ORDER — MORPHINE SULFATE 20 MG/ML
10 SOLUTION ORAL
Status: DISCONTINUED | OUTPATIENT
Start: 2022-01-01 | End: 2022-01-01

## 2022-01-01 RX ORDER — CARBAMAZEPINE 200 MG/1
200 TABLET, EXTENDED RELEASE ORAL 2 TIMES DAILY
Status: DISCONTINUED | OUTPATIENT
Start: 2022-01-01 | End: 2022-01-01 | Stop reason: CLARIF

## 2022-01-01 RX ORDER — EZETIMIBE 10 MG/1
10 TABLET ORAL DAILY
Status: DISCONTINUED | OUTPATIENT
Start: 2022-01-01 | End: 2022-01-01

## 2022-01-01 RX ORDER — AMOXICILLIN 250 MG
1 CAPSULE ORAL DAILY PRN
Status: DISCONTINUED | OUTPATIENT
Start: 2022-01-01 | End: 2022-01-01

## 2022-01-01 RX ORDER — GLYCOPYRROLATE 0.2 MG/ML
0.2 INJECTION INTRAMUSCULAR; INTRAVENOUS
Status: DISCONTINUED | OUTPATIENT
Start: 2022-01-01 | End: 2022-01-01

## 2022-01-01 RX ORDER — GLIPIZIDE 5 MG/1
5 TABLET ORAL
Status: DISCONTINUED | OUTPATIENT
Start: 2022-01-01 | End: 2022-01-01

## 2022-01-01 RX ORDER — LORAZEPAM 1 MG/1
1 TABLET ORAL EVERY 4 HOURS
Status: DISCONTINUED | OUTPATIENT
Start: 2022-01-01 | End: 2022-01-01

## 2022-01-01 RX ORDER — AMOXICILLIN 250 MG
1 CAPSULE ORAL DAILY
Status: DISCONTINUED | OUTPATIENT
Start: 2022-01-01 | End: 2022-01-01 | Stop reason: HOSPADM

## 2022-01-01 RX ORDER — OMEPRAZOLE 20 MG/1
20 CAPSULE, DELAYED RELEASE ORAL
Status: DISCONTINUED | OUTPATIENT
Start: 2022-01-01 | End: 2022-01-01 | Stop reason: HOSPADM

## 2022-01-01 RX ORDER — MORPHINE SULFATE 20 MG/ML
15 SOLUTION ORAL
Status: DISCONTINUED | OUTPATIENT
Start: 2022-01-01 | End: 2022-01-01

## 2022-01-01 RX ORDER — METOPROLOL TARTRATE 25 MG/1
25 TABLET, FILM COATED ORAL 2 TIMES DAILY
Status: DISCONTINUED | OUTPATIENT
Start: 2022-01-01 | End: 2022-01-01

## 2022-01-01 RX ORDER — BETAMETHASONE DIPROPIONATE 0.5 MG/G
OINTMENT TOPICAL DAILY
Status: DISCONTINUED | OUTPATIENT
Start: 2022-01-01 | End: 2022-01-01

## 2022-01-01 RX ORDER — ZOLPIDEM TARTRATE 5 MG/1
5 TABLET ORAL
Status: DISCONTINUED | OUTPATIENT
Start: 2022-01-01 | End: 2022-01-01

## 2022-01-01 RX ORDER — SERTRALINE HYDROCHLORIDE 50 MG/1
50 TABLET, FILM COATED ORAL DAILY
Status: DISCONTINUED | OUTPATIENT
Start: 2022-01-01 | End: 2022-01-01 | Stop reason: HOSPADM

## 2022-01-01 RX ORDER — LEVETIRACETAM 500 MG/1
500 TABLET ORAL 2 TIMES DAILY
Status: DISCONTINUED | OUTPATIENT
Start: 2022-01-01 | End: 2022-01-01 | Stop reason: HOSPADM

## 2022-01-01 RX ORDER — CARBAMAZEPINE 200 MG/1
200 TABLET ORAL 2 TIMES DAILY
Status: DISCONTINUED | OUTPATIENT
Start: 2022-01-01 | End: 2022-01-01

## 2022-01-01 RX ORDER — LORAZEPAM 2 MG/ML
4 CONCENTRATE ORAL
Status: DISCONTINUED | OUTPATIENT
Start: 2022-01-01 | End: 2022-01-01

## 2022-01-01 RX ORDER — TRAMADOL HYDROCHLORIDE 50 MG/1
50 TABLET ORAL
Status: DISCONTINUED | OUTPATIENT
Start: 2022-01-01 | End: 2022-01-01 | Stop reason: HOSPADM

## 2022-01-01 RX ORDER — DEXAMETHASONE 4 MG/1
2 TABLET ORAL
Status: DISCONTINUED | OUTPATIENT
Start: 2022-01-01 | End: 2022-01-01

## 2022-01-01 RX ORDER — LORAZEPAM 2 MG/ML
1 CONCENTRATE ORAL
Status: DISCONTINUED | OUTPATIENT
Start: 2022-01-01 | End: 2022-01-01

## 2022-01-01 RX ORDER — OXYMETAZOLINE HCL 0.05 %
2 SPRAY, NON-AEROSOL (ML) NASAL
Status: DISCONTINUED | OUTPATIENT
Start: 2022-01-01 | End: 2022-01-01 | Stop reason: HOSPADM

## 2022-01-01 RX ORDER — FACIAL-BODY WIPES
10 EACH TOPICAL DAILY PRN
Status: DISCONTINUED | OUTPATIENT
Start: 2022-01-01 | End: 2022-01-01 | Stop reason: HOSPADM

## 2022-01-01 RX ORDER — HYOSCYAMINE SULFATE 0.12 MG/ML
125 LIQUID ORAL
Status: DISCONTINUED | OUTPATIENT
Start: 2022-01-01 | End: 2022-01-01 | Stop reason: HOSPADM

## 2022-01-01 RX ORDER — PREGABALIN 50 MG/1
100 CAPSULE ORAL 2 TIMES DAILY
Status: DISCONTINUED | OUTPATIENT
Start: 2022-01-01 | End: 2022-01-01 | Stop reason: HOSPADM

## 2022-01-01 RX ORDER — DEXAMETHASONE 4 MG/1
8 TABLET ORAL 3 TIMES DAILY
Status: DISCONTINUED | OUTPATIENT
Start: 2022-01-01 | End: 2022-01-01

## 2022-01-01 RX ORDER — OXYCODONE HYDROCHLORIDE 5 MG/1
10 TABLET ORAL
Status: DISCONTINUED | OUTPATIENT
Start: 2022-01-01 | End: 2022-01-01 | Stop reason: HOSPADM

## 2022-01-01 RX ORDER — OXYCODONE HYDROCHLORIDE 5 MG/1
10 TABLET ORAL
Status: DISCONTINUED | OUTPATIENT
Start: 2022-01-01 | End: 2022-01-01

## 2022-01-01 RX ORDER — HALOPERIDOL 2 MG/ML
1 SOLUTION ORAL
Status: DISCONTINUED | OUTPATIENT
Start: 2022-01-01 | End: 2022-01-01 | Stop reason: HOSPADM

## 2022-01-01 RX ORDER — LORAZEPAM 2 MG/ML
4 INJECTION INTRAMUSCULAR
Status: DISCONTINUED | OUTPATIENT
Start: 2022-01-01 | End: 2022-01-01 | Stop reason: HOSPADM

## 2022-01-01 RX ORDER — LEVETIRACETAM 500 MG/1
500 TABLET ORAL 2 TIMES DAILY
Status: DISCONTINUED | OUTPATIENT
Start: 2022-01-01 | End: 2022-01-01

## 2022-01-01 RX ORDER — EZETIMIBE 10 MG/1
10 TABLET ORAL DAILY
COMMUNITY

## 2022-01-01 RX ORDER — METFORMIN HYDROCHLORIDE 500 MG/1
500 TABLET ORAL 2 TIMES DAILY WITH MEALS
Status: DISCONTINUED | OUTPATIENT
Start: 2022-01-01 | End: 2022-01-01

## 2022-01-01 RX ORDER — GLIPIZIDE 5 MG/1
5 TABLET ORAL
Status: DISCONTINUED | OUTPATIENT
Start: 2022-01-01 | End: 2022-01-01 | Stop reason: HOSPADM

## 2022-01-01 RX ORDER — HYDROMORPHONE HYDROCHLORIDE 2 MG/ML
6 INJECTION, SOLUTION INTRAMUSCULAR; INTRAVENOUS; SUBCUTANEOUS
Status: DISCONTINUED | OUTPATIENT
Start: 2022-01-01 | End: 2022-01-01 | Stop reason: HOSPADM

## 2022-01-01 RX ORDER — MORPHINE SULFATE 20 MG/ML
10 SOLUTION ORAL
Status: DISCONTINUED | OUTPATIENT
Start: 2022-01-01 | End: 2022-01-01 | Stop reason: HOSPADM

## 2022-01-01 RX ORDER — GABAPENTIN 100 MG/1
200 CAPSULE ORAL
Status: DISCONTINUED | OUTPATIENT
Start: 2022-01-01 | End: 2022-01-01

## 2022-01-01 RX ORDER — LORAZEPAM 2 MG/ML
1 CONCENTRATE ORAL
Status: DISCONTINUED | OUTPATIENT
Start: 2022-01-01 | End: 2022-01-01 | Stop reason: HOSPADM

## 2022-01-01 RX ORDER — ACETAMINOPHEN 325 MG/1
325 TABLET ORAL
Status: DISCONTINUED | OUTPATIENT
Start: 2022-01-01 | End: 2022-01-01 | Stop reason: HOSPADM

## 2022-01-01 RX ORDER — LACOSAMIDE 100 MG/1
100 TABLET ORAL 2 TIMES DAILY
Status: DISCONTINUED | OUTPATIENT
Start: 2022-01-01 | End: 2022-01-01

## 2022-01-01 RX ORDER — PREGABALIN 50 MG/1
100 CAPSULE ORAL 2 TIMES DAILY
Status: DISCONTINUED | OUTPATIENT
Start: 2022-01-01 | End: 2022-01-01

## 2022-01-01 RX ORDER — OXYMETAZOLINE HCL 0.05 %
2 SPRAY, NON-AEROSOL (ML) NASAL 2 TIMES DAILY
Status: DISCONTINUED | OUTPATIENT
Start: 2022-01-01 | End: 2022-01-01 | Stop reason: HOSPADM

## 2022-01-01 RX ORDER — ACETAMINOPHEN 650 MG/1
650 SUPPOSITORY RECTAL
Status: DISCONTINUED | OUTPATIENT
Start: 2022-01-01 | End: 2022-01-01 | Stop reason: HOSPADM

## 2022-01-01 RX ORDER — AMOXICILLIN 250 MG
2 CAPSULE ORAL 2 TIMES DAILY
Status: DISCONTINUED | OUTPATIENT
Start: 2022-01-01 | End: 2022-01-01 | Stop reason: HOSPADM

## 2022-01-01 RX ORDER — METOPROLOL TARTRATE 25 MG/1
12.5 TABLET, FILM COATED ORAL 2 TIMES DAILY
Status: DISCONTINUED | OUTPATIENT
Start: 2022-01-01 | End: 2022-01-01 | Stop reason: HOSPADM

## 2022-01-01 RX ORDER — GABAPENTIN 100 MG/1
200 CAPSULE ORAL 2 TIMES DAILY
Status: DISCONTINUED | OUTPATIENT
Start: 2022-01-01 | End: 2022-01-01

## 2022-01-01 RX ORDER — LORAZEPAM 2 MG/ML
4 CONCENTRATE ORAL
Status: DISCONTINUED | OUTPATIENT
Start: 2022-01-01 | End: 2022-01-01 | Stop reason: HOSPADM

## 2022-01-01 RX ORDER — HYDROMORPHONE HYDROCHLORIDE 2 MG/ML
2 INJECTION, SOLUTION INTRAMUSCULAR; INTRAVENOUS; SUBCUTANEOUS EVERY 4 HOURS
Status: DISCONTINUED | OUTPATIENT
Start: 2022-01-01 | End: 2022-01-01

## 2022-01-01 RX ORDER — OMEPRAZOLE 20 MG/1
20 CAPSULE, DELAYED RELEASE ORAL DAILY
COMMUNITY

## 2022-01-01 RX ORDER — LORAZEPAM 1 MG/1
1 TABLET ORAL EVERY 4 HOURS
Status: DISCONTINUED | OUTPATIENT
Start: 2022-01-01 | End: 2022-01-01 | Stop reason: HOSPADM

## 2022-01-01 RX ORDER — CARBAMAZEPINE 200 MG/1
200 TABLET, EXTENDED RELEASE ORAL EVERY 12 HOURS
Status: DISCONTINUED | OUTPATIENT
Start: 2022-01-01 | End: 2022-01-01

## 2022-01-01 RX ORDER — DEXAMETHASONE SODIUM PHOSPHATE 4 MG/ML
4 INJECTION, SOLUTION INTRA-ARTICULAR; INTRALESIONAL; INTRAMUSCULAR; INTRAVENOUS; SOFT TISSUE DAILY
Status: DISCONTINUED | OUTPATIENT
Start: 2022-01-01 | End: 2022-01-01

## 2022-01-01 RX ORDER — METOPROLOL TARTRATE 25 MG/1
12.5 TABLET, FILM COATED ORAL 2 TIMES DAILY
Status: DISCONTINUED | OUTPATIENT
Start: 2022-01-01 | End: 2022-01-01

## 2022-01-01 RX ORDER — CARBAMAZEPINE 200 MG/1
200 TABLET, EXTENDED RELEASE ORAL 2 TIMES DAILY
Status: DISCONTINUED | OUTPATIENT
Start: 2022-01-01 | End: 2022-01-01 | Stop reason: HOSPADM

## 2022-01-01 RX ORDER — OXYCODONE HCL 10 MG/1
20 TABLET, FILM COATED, EXTENDED RELEASE ORAL EVERY 12 HOURS
Status: DISCONTINUED | OUTPATIENT
Start: 2022-01-01 | End: 2022-01-01

## 2022-01-01 RX ORDER — CARBAMAZEPINE 200 MG/1
200 TABLET ORAL 2 TIMES DAILY
Status: DISCONTINUED | OUTPATIENT
Start: 2022-01-01 | End: 2022-01-01 | Stop reason: HOSPADM

## 2022-01-01 RX ORDER — METOPROLOL TARTRATE 25 MG/1
25 TABLET, FILM COATED ORAL 2 TIMES DAILY
Status: DISCONTINUED | OUTPATIENT
Start: 2022-01-01 | End: 2022-01-01 | Stop reason: HOSPADM

## 2022-01-01 RX ORDER — PREGABALIN 50 MG/1
50 CAPSULE ORAL 3 TIMES DAILY
Status: DISCONTINUED | OUTPATIENT
Start: 2022-01-01 | End: 2022-01-01

## 2022-01-01 RX ORDER — OMEPRAZOLE 20 MG/1
20 CAPSULE, DELAYED RELEASE ORAL
Status: DISCONTINUED | OUTPATIENT
Start: 2022-01-01 | End: 2022-01-01

## 2022-01-01 RX ORDER — DEXAMETHASONE 2 MG/1
2 TABLET ORAL 3 TIMES DAILY
Status: DISCONTINUED | OUTPATIENT
Start: 2022-01-01 | End: 2022-01-01 | Stop reason: HOSPADM

## 2022-01-01 RX ORDER — PREGABALIN 50 MG/1
50 CAPSULE ORAL 2 TIMES DAILY
Status: DISCONTINUED | OUTPATIENT
Start: 2022-01-01 | End: 2022-01-01

## 2022-01-01 RX ORDER — DOXYLAMINE SUCCINATE 25 MG
TABLET ORAL DAILY
Status: DISCONTINUED | OUTPATIENT
Start: 2022-01-01 | End: 2022-01-01

## 2022-01-01 RX ORDER — GABAPENTIN 100 MG/1
100 CAPSULE ORAL
Status: DISCONTINUED | OUTPATIENT
Start: 2022-01-01 | End: 2022-01-01

## 2022-01-01 RX ORDER — LISINOPRIL 5 MG/1
10 TABLET ORAL DAILY
Status: DISCONTINUED | OUTPATIENT
Start: 2022-01-01 | End: 2022-01-01

## 2022-01-01 RX ORDER — KETOROLAC TROMETHAMINE 30 MG/ML
30 INJECTION, SOLUTION INTRAMUSCULAR; INTRAVENOUS
Status: DISCONTINUED | OUTPATIENT
Start: 2022-01-01 | End: 2022-01-01 | Stop reason: HOSPADM

## 2022-01-01 RX ORDER — SERTRALINE HYDROCHLORIDE 50 MG/1
50 TABLET, FILM COATED ORAL DAILY
Status: DISCONTINUED | OUTPATIENT
Start: 2022-01-01 | End: 2022-01-01

## 2022-01-01 RX ORDER — LEVETIRACETAM 100 MG/ML
250 SOLUTION ORAL 2 TIMES DAILY
Status: DISCONTINUED | OUTPATIENT
Start: 2022-01-01 | End: 2022-01-01 | Stop reason: SDUPTHER

## 2022-01-01 RX ORDER — HYDROMORPHONE HYDROCHLORIDE 1 MG/ML
0.5 INJECTION, SOLUTION INTRAMUSCULAR; INTRAVENOUS; SUBCUTANEOUS EVERY 4 HOURS
Status: DISCONTINUED | OUTPATIENT
Start: 2022-01-01 | End: 2022-01-01

## 2022-01-01 RX ORDER — HYDROMORPHONE HYDROCHLORIDE 2 MG/ML
4 INJECTION, SOLUTION INTRAMUSCULAR; INTRAVENOUS; SUBCUTANEOUS EVERY 4 HOURS
Status: DISCONTINUED | OUTPATIENT
Start: 2022-01-01 | End: 2022-01-01

## 2022-01-01 RX ORDER — LACOSAMIDE 100 MG/1
100 TABLET ORAL 2 TIMES DAILY
Status: DISCONTINUED | OUTPATIENT
Start: 2022-01-01 | End: 2022-01-01 | Stop reason: HOSPADM

## 2022-01-01 RX ORDER — OXYMETAZOLINE HCL 0.05 %
2 SPRAY, NON-AEROSOL (ML) NASAL 2 TIMES DAILY
Status: DISCONTINUED | OUTPATIENT
Start: 2022-01-01 | End: 2022-01-01

## 2022-01-01 RX ORDER — BETAMETHASONE DIPROPIONATE 0.5 MG/G
CREAM TOPICAL 2 TIMES DAILY
Status: DISCONTINUED | OUTPATIENT
Start: 2022-01-01 | End: 2022-01-01

## 2022-01-01 RX ORDER — DEXAMETHASONE 4 MG/1
2 TABLET ORAL
Status: DISCONTINUED | OUTPATIENT
Start: 2022-01-01 | End: 2022-01-01 | Stop reason: HOSPADM

## 2022-01-01 RX ORDER — BETAMETHASONE DIPROPIONATE 0.5 MG/G
CREAM TOPICAL
Status: DISCONTINUED | OUTPATIENT
Start: 2022-01-01 | End: 2022-01-01 | Stop reason: HOSPADM

## 2022-01-01 RX ORDER — ACETAMINOPHEN 325 MG/1
650 TABLET ORAL
Status: DISCONTINUED | OUTPATIENT
Start: 2022-01-01 | End: 2022-01-01 | Stop reason: HOSPADM

## 2022-01-01 RX ORDER — LORAZEPAM 2 MG/ML
2 INJECTION INTRAMUSCULAR EVERY 4 HOURS
Status: DISCONTINUED | OUTPATIENT
Start: 2022-01-01 | End: 2022-01-01

## 2022-01-01 RX ADMIN — LORAZEPAM 1 MG: 1 TABLET ORAL at 03:50

## 2022-01-01 RX ADMIN — LEVETIRACETAM 500 MG: 500 TABLET, FILM COATED ORAL at 16:54

## 2022-01-01 RX ADMIN — OMEPRAZOLE 20 MG: 20 CAPSULE, DELAYED RELEASE ORAL at 08:44

## 2022-01-01 RX ADMIN — HYDROMORPHONE HYDROCHLORIDE 0.5 MG: 1 INJECTION, SOLUTION INTRAMUSCULAR; INTRAVENOUS; SUBCUTANEOUS at 04:00

## 2022-01-01 RX ADMIN — PREGABALIN 100 MG: 50 CAPSULE ORAL at 09:32

## 2022-01-01 RX ADMIN — GABAPENTIN 200 MG: 100 CAPSULE ORAL at 21:00

## 2022-01-01 RX ADMIN — LACOSAMIDE 100 MG: 100 TABLET, FILM COATED ORAL at 18:00

## 2022-01-01 RX ADMIN — GLYCOPYRROLATE 0.2 MG: 0.2 INJECTION, SOLUTION INTRAMUSCULAR; INTRAVENOUS at 13:04

## 2022-01-01 RX ADMIN — LORAZEPAM 1 MG: 1 TABLET ORAL at 20:25

## 2022-01-01 RX ADMIN — METFORMIN HYDROCHLORIDE 500 MG: 500 TABLET ORAL at 17:00

## 2022-01-01 RX ADMIN — MORPHINE SULFATE 10 MG: 10 SOLUTION ORAL at 19:30

## 2022-01-01 RX ADMIN — OMEPRAZOLE 20 MG: 20 CAPSULE, DELAYED RELEASE ORAL at 08:12

## 2022-01-01 RX ADMIN — LACOSAMIDE 100 MG: 100 TABLET, FILM COATED ORAL at 10:20

## 2022-01-01 RX ADMIN — SERTRALINE 50 MG: 50 TABLET, FILM COATED ORAL at 07:40

## 2022-01-01 RX ADMIN — CARBAMAZEPINE 200 MG: 200 TABLET, EXTENDED RELEASE ORAL at 18:21

## 2022-01-01 RX ADMIN — LISINOPRIL 10 MG: 5 TABLET ORAL at 09:13

## 2022-01-01 RX ADMIN — LORAZEPAM 4 MG: 2 INJECTION INTRAMUSCULAR; INTRAVENOUS at 15:12

## 2022-01-01 RX ADMIN — LACOSAMIDE 100 MG: 100 TABLET, FILM COATED ORAL at 16:54

## 2022-01-01 RX ADMIN — Medication 1 MG: at 12:31

## 2022-01-01 RX ADMIN — GLIPIZIDE 5 MG: 5 TABLET ORAL at 09:59

## 2022-01-01 RX ADMIN — HYDROMORPHONE HYDROCHLORIDE 2 MG: 2 INJECTION, SOLUTION INTRAMUSCULAR; INTRAVENOUS; SUBCUTANEOUS at 06:55

## 2022-01-01 RX ADMIN — EZETIMIBE 10 MG: 10 TABLET ORAL at 08:10

## 2022-01-01 RX ADMIN — CARBAMAZEPINE 200 MG: 200 TABLET, EXTENDED RELEASE ORAL at 07:37

## 2022-01-01 RX ADMIN — ZOLPIDEM TARTRATE 5 MG: 5 TABLET ORAL at 21:37

## 2022-01-01 RX ADMIN — PREGABALIN 50 MG: 50 CAPSULE ORAL at 16:00

## 2022-01-01 RX ADMIN — PREGABALIN 100 MG: 50 CAPSULE ORAL at 21:05

## 2022-01-01 RX ADMIN — DEXAMETHASONE 2 MG: 2 TABLET ORAL at 13:00

## 2022-01-01 RX ADMIN — BETAMETHASONE DIPROPIONATE: 0.5 OINTMENT, AUGMENTED TOPICAL at 08:48

## 2022-01-01 RX ADMIN — PREGABALIN 100 MG: 50 CAPSULE ORAL at 21:00

## 2022-01-01 RX ADMIN — NASAL DECONGESTANT 2 SPRAY: 0.05 SPRAY NASAL at 17:44

## 2022-01-01 RX ADMIN — CARBAMAZEPINE 200 MG: 200 TABLET, EXTENDED RELEASE ORAL at 18:00

## 2022-01-01 RX ADMIN — LISINOPRIL 10 MG: 5 TABLET ORAL at 08:44

## 2022-01-01 RX ADMIN — EZETIMIBE 10 MG: 10 TABLET ORAL at 07:37

## 2022-01-01 RX ADMIN — MORPHINE SULFATE 10 MG: 10 SOLUTION ORAL at 20:32

## 2022-01-01 RX ADMIN — DEXAMETHASONE 2 MG: 4 TABLET ORAL at 18:16

## 2022-01-01 RX ADMIN — LORAZEPAM 2 MG: 2 INJECTION INTRAMUSCULAR; INTRAVENOUS at 16:09

## 2022-01-01 RX ADMIN — LORAZEPAM 1 MG: 1 TABLET ORAL at 23:00

## 2022-01-01 RX ADMIN — HYDROMORPHONE HYDROCHLORIDE 4 MG: 2 INJECTION, SOLUTION INTRAMUSCULAR; INTRAVENOUS; SUBCUTANEOUS at 09:20

## 2022-01-01 RX ADMIN — METOPROLOL 12.5 MG: 25 TABLET ORAL at 17:01

## 2022-01-01 RX ADMIN — LACOSAMIDE 100 MG: 100 TABLET, FILM COATED ORAL at 08:44

## 2022-01-01 RX ADMIN — Medication 1 MG: at 20:39

## 2022-01-01 RX ADMIN — OMEPRAZOLE 20 MG: 20 CAPSULE, DELAYED RELEASE ORAL at 09:41

## 2022-01-01 RX ADMIN — OXYCODONE HYDROCHLORIDE 20 MG: 10 TABLET, FILM COATED, EXTENDED RELEASE ORAL at 20:38

## 2022-01-01 RX ADMIN — ACETAMINOPHEN 650 MG: 650 SUPPOSITORY RECTAL at 08:50

## 2022-01-01 RX ADMIN — EZETIMIBE 10 MG: 10 TABLET ORAL at 08:49

## 2022-01-01 RX ADMIN — DOCUSATE SODIUM 50 MG AND SENNOSIDES 8.6 MG 2 TABLET: 8.6; 5 TABLET, FILM COATED ORAL at 08:45

## 2022-01-01 RX ADMIN — MORPHINE SULFATE 10 MG: 10 SOLUTION ORAL at 21:08

## 2022-01-01 RX ADMIN — METOPROLOL 25 MG: 25 TABLET ORAL at 18:17

## 2022-01-01 RX ADMIN — NASAL DECONGESTANT 2 SPRAY: 0.05 SPRAY NASAL at 09:31

## 2022-01-01 RX ADMIN — DOCUSATE SODIUM 50MG AND SENNOSIDES 8.6MG 1 TABLET: 8.6; 5 TABLET, FILM COATED ORAL at 21:06

## 2022-01-01 RX ADMIN — LORAZEPAM 2 MG: 2 INJECTION INTRAMUSCULAR; INTRAVENOUS at 23:58

## 2022-01-01 RX ADMIN — CARBAMAZEPINE 200 MG: 200 TABLET ORAL at 09:59

## 2022-01-01 RX ADMIN — LACOSAMIDE 100 MG: 100 TABLET, FILM COATED ORAL at 09:34

## 2022-01-01 RX ADMIN — LISINOPRIL 10 MG: 5 TABLET ORAL at 09:15

## 2022-01-01 RX ADMIN — DEXAMETHASONE 2 MG: 4 TABLET ORAL at 12:41

## 2022-01-01 RX ADMIN — DEXAMETHASONE 2 MG: 4 TABLET ORAL at 12:21

## 2022-01-01 RX ADMIN — LORAZEPAM 1 MG: 1 TABLET ORAL at 20:07

## 2022-01-01 RX ADMIN — GLIPIZIDE 5 MG: 5 TABLET ORAL at 08:10

## 2022-01-01 RX ADMIN — LACOSAMIDE 100 MG: 100 TABLET, FILM COATED ORAL at 17:10

## 2022-01-01 RX ADMIN — LACOSAMIDE 100 MG: 100 TABLET, FILM COATED ORAL at 09:13

## 2022-01-01 RX ADMIN — PREGABALIN 100 MG: 50 CAPSULE ORAL at 21:12

## 2022-01-01 RX ADMIN — Medication 1 MG: at 21:06

## 2022-01-01 RX ADMIN — SERTRALINE 50 MG: 50 TABLET, FILM COATED ORAL at 08:11

## 2022-01-01 RX ADMIN — MORPHINE SULFATE 10 MG: 10 SOLUTION ORAL at 22:59

## 2022-01-01 RX ADMIN — LISINOPRIL 10 MG: 5 TABLET ORAL at 08:34

## 2022-01-01 RX ADMIN — LISINOPRIL 10 MG: 5 TABLET ORAL at 09:00

## 2022-01-01 RX ADMIN — PREGABALIN 50 MG: 50 CAPSULE ORAL at 09:43

## 2022-01-01 RX ADMIN — METFORMIN HYDROCHLORIDE 500 MG: 500 TABLET ORAL at 08:10

## 2022-01-01 RX ADMIN — GLIPIZIDE 5 MG: 5 TABLET ORAL at 09:15

## 2022-01-01 RX ADMIN — BETAMETHASONE DIPROPIONATE: 0.5 OINTMENT, AUGMENTED TOPICAL at 09:13

## 2022-01-01 RX ADMIN — DEXAMETHASONE 2 MG: 4 TABLET ORAL at 08:09

## 2022-01-01 RX ADMIN — EZETIMIBE 10 MG: 10 TABLET ORAL at 08:44

## 2022-01-01 RX ADMIN — METFORMIN HYDROCHLORIDE 500 MG: 500 TABLET ORAL at 16:54

## 2022-01-01 RX ADMIN — PREGABALIN 100 MG: 50 CAPSULE ORAL at 08:48

## 2022-01-01 RX ADMIN — METFORMIN HYDROCHLORIDE 500 MG: 500 TABLET ORAL at 17:44

## 2022-01-01 RX ADMIN — MORPHINE SULFATE 10 MG: 10 SOLUTION ORAL at 17:38

## 2022-01-01 RX ADMIN — DEXAMETHASONE 2 MG: 2 TABLET ORAL at 12:38

## 2022-01-01 RX ADMIN — LORAZEPAM 2 MG: 2 INJECTION INTRAMUSCULAR; INTRAVENOUS at 14:59

## 2022-01-01 RX ADMIN — LEVETIRACETAM 500 MG: 500 TABLET, FILM COATED ORAL at 07:38

## 2022-01-01 RX ADMIN — LORAZEPAM 2 MG: 2 INJECTION INTRAMUSCULAR; INTRAVENOUS at 06:39

## 2022-01-01 RX ADMIN — METOPROLOL 25 MG: 25 TABLET ORAL at 08:42

## 2022-01-01 RX ADMIN — LEVETIRACETAM 500 MG: 500 TABLET, FILM COATED ORAL at 18:00

## 2022-01-01 RX ADMIN — DEXAMETHASONE 2 MG: 4 TABLET ORAL at 17:59

## 2022-01-01 RX ADMIN — CARBAMAZEPINE 200 MG: 200 TABLET ORAL at 17:08

## 2022-01-01 RX ADMIN — METOPROLOL 25 MG: 25 TABLET ORAL at 18:21

## 2022-01-01 RX ADMIN — DEXAMETHASONE 2 MG: 2 TABLET ORAL at 08:44

## 2022-01-01 RX ADMIN — METFORMIN HYDROCHLORIDE 500 MG: 500 TABLET ORAL at 08:34

## 2022-01-01 RX ADMIN — PREGABALIN 100 MG: 50 CAPSULE ORAL at 08:11

## 2022-01-01 RX ADMIN — SERTRALINE 50 MG: 50 TABLET, FILM COATED ORAL at 09:34

## 2022-01-01 RX ADMIN — LORAZEPAM 1 MG: 1 TABLET ORAL at 04:09

## 2022-01-01 RX ADMIN — METOPROLOL 25 MG: 25 TABLET ORAL at 08:11

## 2022-01-01 RX ADMIN — DEXAMETHASONE 2 MG: 4 TABLET ORAL at 08:32

## 2022-01-01 RX ADMIN — CARBAMAZEPINE 200 MG: 200 TABLET ORAL at 09:33

## 2022-01-01 RX ADMIN — Medication 10 MG: at 10:30

## 2022-01-01 RX ADMIN — GLYCOPYRROLATE 0.2 MG: 0.2 INJECTION, SOLUTION INTRAMUSCULAR; INTRAVENOUS at 09:20

## 2022-01-01 RX ADMIN — OMEPRAZOLE 20 MG: 20 CAPSULE, DELAYED RELEASE ORAL at 09:33

## 2022-01-01 RX ADMIN — METFORMIN HYDROCHLORIDE 500 MG: 500 TABLET ORAL at 08:00

## 2022-01-01 RX ADMIN — CARBAMAZEPINE 200 MG: 200 TABLET ORAL at 08:44

## 2022-01-01 RX ADMIN — METOPROLOL 25 MG: 25 TABLET ORAL at 09:38

## 2022-01-01 RX ADMIN — Medication 1 MG: at 13:58

## 2022-01-01 RX ADMIN — MORPHINE SULFATE 10 MG: 10 SOLUTION ORAL at 11:26

## 2022-01-01 RX ADMIN — LEVETIRACETAM 500 MG: 500 TABLET, FILM COATED ORAL at 17:08

## 2022-01-01 RX ADMIN — OMEPRAZOLE 20 MG: 20 CAPSULE, DELAYED RELEASE ORAL at 08:50

## 2022-01-01 RX ADMIN — LORAZEPAM 1 MG: 1 TABLET ORAL at 21:04

## 2022-01-01 RX ADMIN — DOCUSATE SODIUM 50MG AND SENNOSIDES 8.6MG 1 TABLET: 8.6; 5 TABLET, FILM COATED ORAL at 08:50

## 2022-01-01 RX ADMIN — LORAZEPAM 2 MG: 2 INJECTION INTRAMUSCULAR; INTRAVENOUS at 15:13

## 2022-01-01 RX ADMIN — SERTRALINE 50 MG: 50 TABLET, FILM COATED ORAL at 09:00

## 2022-01-01 RX ADMIN — LORAZEPAM 1 MG: 1 TABLET ORAL at 20:58

## 2022-01-01 RX ADMIN — MORPHINE SULFATE 10 MG: 10 SOLUTION ORAL at 11:59

## 2022-01-01 RX ADMIN — METOPROLOL 25 MG: 25 TABLET ORAL at 18:00

## 2022-01-01 RX ADMIN — Medication 1 MG: at 23:17

## 2022-01-01 RX ADMIN — LACOSAMIDE 100 MG: 100 TABLET, FILM COATED ORAL at 08:48

## 2022-01-01 RX ADMIN — EZETIMIBE 10 MG: 10 TABLET ORAL at 09:59

## 2022-01-01 RX ADMIN — Medication 1 MG: at 04:17

## 2022-01-01 RX ADMIN — LORAZEPAM 4 MG: 2 INJECTION INTRAMUSCULAR; INTRAVENOUS at 09:34

## 2022-01-01 RX ADMIN — LEVETIRACETAM 500 MG: 500 TABLET, FILM COATED ORAL at 18:16

## 2022-01-01 RX ADMIN — SERTRALINE 50 MG: 50 TABLET, FILM COATED ORAL at 09:13

## 2022-01-01 RX ADMIN — GLIPIZIDE 5 MG: 5 TABLET ORAL at 08:49

## 2022-01-01 RX ADMIN — Medication 1 MG: at 07:54

## 2022-01-01 RX ADMIN — MORPHINE SULFATE 10 MG: 10 SOLUTION ORAL at 15:05

## 2022-01-01 RX ADMIN — MORPHINE SULFATE 10 MG: 10 SOLUTION ORAL at 20:08

## 2022-01-01 RX ADMIN — HYDROMORPHONE HYDROCHLORIDE 2 MG: 2 INJECTION, SOLUTION INTRAMUSCULAR; INTRAVENOUS; SUBCUTANEOUS at 09:05

## 2022-01-01 RX ADMIN — LEVETIRACETAM 500 MG: 500 TABLET, FILM COATED ORAL at 08:09

## 2022-01-01 RX ADMIN — MORPHINE SULFATE 10 MG: 10 SOLUTION ORAL at 07:53

## 2022-01-01 RX ADMIN — MORPHINE SULFATE 10 MG: 10 SOLUTION ORAL at 15:55

## 2022-01-01 RX ADMIN — Medication 1 MG: at 21:50

## 2022-01-01 RX ADMIN — LACOSAMIDE 100 MG: 100 TABLET, FILM COATED ORAL at 09:36

## 2022-01-01 RX ADMIN — LORAZEPAM 1 MG: 1 TABLET ORAL at 16:53

## 2022-01-01 RX ADMIN — LISINOPRIL 10 MG: 5 TABLET ORAL at 07:38

## 2022-01-01 RX ADMIN — SERTRALINE 50 MG: 50 TABLET, FILM COATED ORAL at 08:43

## 2022-01-01 RX ADMIN — LORAZEPAM 2 MG: 2 INJECTION INTRAMUSCULAR; INTRAVENOUS at 20:11

## 2022-01-01 RX ADMIN — PREGABALIN 100 MG: 50 CAPSULE ORAL at 07:40

## 2022-01-01 RX ADMIN — METFORMIN HYDROCHLORIDE 500 MG: 500 TABLET ORAL at 09:33

## 2022-01-01 RX ADMIN — LORAZEPAM 2 MG: 2 INJECTION INTRAMUSCULAR; INTRAVENOUS at 06:26

## 2022-01-01 RX ADMIN — LORAZEPAM 4 MG: 2 INJECTION INTRAMUSCULAR; INTRAVENOUS at 13:01

## 2022-01-01 RX ADMIN — DEXAMETHASONE 2 MG: 4 TABLET ORAL at 14:11

## 2022-01-01 RX ADMIN — CARBAMAZEPINE 200 MG: 200 TABLET ORAL at 21:13

## 2022-01-01 RX ADMIN — DEXAMETHASONE 2 MG: 4 TABLET ORAL at 17:00

## 2022-01-01 RX ADMIN — GLIPIZIDE 5 MG: 5 TABLET ORAL at 10:20

## 2022-01-01 RX ADMIN — DEXAMETHASONE 2 MG: 4 TABLET ORAL at 10:19

## 2022-01-01 RX ADMIN — OMEPRAZOLE 20 MG: 20 CAPSULE, DELAYED RELEASE ORAL at 10:24

## 2022-01-01 RX ADMIN — DEXAMETHASONE 2 MG: 2 TABLET ORAL at 17:21

## 2022-01-01 RX ADMIN — DOCUSATE SODIUM 50MG AND SENNOSIDES 8.6MG 1 TABLET: 8.6; 5 TABLET, FILM COATED ORAL at 00:09

## 2022-01-01 RX ADMIN — LISINOPRIL 10 MG: 5 TABLET ORAL at 09:59

## 2022-01-01 RX ADMIN — EZETIMIBE 10 MG: 10 TABLET ORAL at 10:20

## 2022-01-01 RX ADMIN — HYDROMORPHONE HYDROCHLORIDE 0.5 MG: 1 INJECTION, SOLUTION INTRAMUSCULAR; INTRAVENOUS; SUBCUTANEOUS at 23:58

## 2022-01-01 RX ADMIN — PREGABALIN 100 MG: 50 CAPSULE ORAL at 09:00

## 2022-01-01 RX ADMIN — LORAZEPAM 1 MG: 1 TABLET ORAL at 07:55

## 2022-01-01 RX ADMIN — MICONAZOLE NITRATE: 20 CREAM TOPICAL at 09:00

## 2022-01-01 RX ADMIN — LORAZEPAM 2 MG: 2 INJECTION INTRAMUSCULAR; INTRAVENOUS at 11:57

## 2022-01-01 RX ADMIN — SERTRALINE 50 MG: 50 TABLET, FILM COATED ORAL at 09:16

## 2022-01-01 RX ADMIN — Medication 1 MG: at 21:08

## 2022-01-01 RX ADMIN — LORAZEPAM 2 MG: 2 INJECTION INTRAMUSCULAR; INTRAVENOUS at 23:16

## 2022-01-01 RX ADMIN — HYDROMORPHONE HYDROCHLORIDE 4 MG: 2 INJECTION, SOLUTION INTRAMUSCULAR; INTRAVENOUS; SUBCUTANEOUS at 23:40

## 2022-01-01 RX ADMIN — HYDROMORPHONE HYDROCHLORIDE 0.5 MG: 1 INJECTION, SOLUTION INTRAMUSCULAR; INTRAVENOUS; SUBCUTANEOUS at 07:52

## 2022-01-01 RX ADMIN — DEXAMETHASONE 2 MG: 4 TABLET ORAL at 18:21

## 2022-01-01 RX ADMIN — METOPROLOL 25 MG: 25 TABLET ORAL at 10:23

## 2022-01-01 RX ADMIN — HYDROMORPHONE HYDROCHLORIDE 2 MG: 2 INJECTION, SOLUTION INTRAMUSCULAR; INTRAVENOUS; SUBCUTANEOUS at 16:10

## 2022-01-01 RX ADMIN — LORAZEPAM 2 MG: 2 INJECTION INTRAMUSCULAR; INTRAVENOUS at 16:40

## 2022-01-01 RX ADMIN — METFORMIN HYDROCHLORIDE 500 MG: 500 TABLET ORAL at 09:15

## 2022-01-01 RX ADMIN — DEXAMETHASONE 2 MG: 4 TABLET ORAL at 12:47

## 2022-01-01 RX ADMIN — DEXAMETHASONE 2 MG: 4 TABLET ORAL at 08:40

## 2022-01-01 RX ADMIN — PREGABALIN 100 MG: 50 CAPSULE ORAL at 20:23

## 2022-01-01 RX ADMIN — LISINOPRIL 10 MG: 5 TABLET ORAL at 08:10

## 2022-01-01 RX ADMIN — OMEPRAZOLE 20 MG: 20 CAPSULE, DELAYED RELEASE ORAL at 08:42

## 2022-01-01 RX ADMIN — CARBAMAZEPINE 200 MG: 200 TABLET, EXTENDED RELEASE ORAL at 09:00

## 2022-01-01 RX ADMIN — MORPHINE SULFATE 10 MG: 10 SOLUTION ORAL at 01:27

## 2022-01-01 RX ADMIN — MICONAZOLE NITRATE: 20 CREAM TOPICAL at 10:38

## 2022-01-01 RX ADMIN — LEVETIRACETAM 500 MG: 500 TABLET, FILM COATED ORAL at 10:25

## 2022-01-01 RX ADMIN — LORAZEPAM 2 MG: 2 INJECTION INTRAMUSCULAR; INTRAVENOUS at 07:47

## 2022-01-01 RX ADMIN — MORPHINE SULFATE 15 MG: 10 SOLUTION ORAL at 02:49

## 2022-01-01 RX ADMIN — NASAL DECONGESTANT 2 SPRAY: 0.05 SPRAY NASAL at 17:19

## 2022-01-01 RX ADMIN — DEXAMETHASONE 2 MG: 4 TABLET ORAL at 08:49

## 2022-01-01 RX ADMIN — METOPROLOL 25 MG: 25 TABLET ORAL at 08:50

## 2022-01-01 RX ADMIN — LACOSAMIDE 100 MG: 100 TABLET, FILM COATED ORAL at 07:40

## 2022-01-01 RX ADMIN — ZOLPIDEM TARTRATE 5 MG: 5 TABLET ORAL at 22:00

## 2022-01-01 RX ADMIN — DEXAMETHASONE 2 MG: 4 TABLET ORAL at 08:00

## 2022-01-01 RX ADMIN — LORAZEPAM 1 MG: 1 TABLET ORAL at 04:25

## 2022-01-01 RX ADMIN — LORAZEPAM 1 MG: 1 TABLET ORAL at 16:25

## 2022-01-01 RX ADMIN — BISACODYL 10 MG: 10 SUPPOSITORY RECTAL at 18:12

## 2022-01-01 RX ADMIN — LORAZEPAM 1 MG: 1 TABLET ORAL at 12:27

## 2022-01-01 RX ADMIN — LORAZEPAM 1 MG: 1 TABLET ORAL at 23:28

## 2022-01-01 RX ADMIN — GLIPIZIDE 5 MG: 5 TABLET ORAL at 08:44

## 2022-01-01 RX ADMIN — LEVETIRACETAM 500 MG: 500 TABLET, FILM COATED ORAL at 08:40

## 2022-01-01 RX ADMIN — LORAZEPAM 1 MG: 1 TABLET ORAL at 10:03

## 2022-01-01 RX ADMIN — METFORMIN HYDROCHLORIDE 500 MG: 500 TABLET ORAL at 09:59

## 2022-01-01 RX ADMIN — LEVETIRACETAM 500 MG: 500 TABLET, FILM COATED ORAL at 08:44

## 2022-01-01 RX ADMIN — SERTRALINE 50 MG: 50 TABLET, FILM COATED ORAL at 08:36

## 2022-01-01 RX ADMIN — OMEPRAZOLE 20 MG: 20 CAPSULE, DELAYED RELEASE ORAL at 06:00

## 2022-01-01 RX ADMIN — LORAZEPAM 4 MG: 2 INJECTION INTRAMUSCULAR; INTRAVENOUS at 09:19

## 2022-01-01 RX ADMIN — CARBAMAZEPINE 200 MG: 200 TABLET, EXTENDED RELEASE ORAL at 17:58

## 2022-01-01 RX ADMIN — LACOSAMIDE 100 MG: 100 TABLET, FILM COATED ORAL at 10:00

## 2022-01-01 RX ADMIN — PREGABALIN 100 MG: 50 CAPSULE ORAL at 21:06

## 2022-01-01 RX ADMIN — LORAZEPAM 2 MG: 2 INJECTION INTRAMUSCULAR; INTRAVENOUS at 07:31

## 2022-01-01 RX ADMIN — METFORMIN HYDROCHLORIDE 500 MG: 500 TABLET ORAL at 21:13

## 2022-01-01 RX ADMIN — METFORMIN HYDROCHLORIDE 500 MG: 500 TABLET ORAL at 08:41

## 2022-01-01 RX ADMIN — OMEPRAZOLE 20 MG: 20 CAPSULE, DELAYED RELEASE ORAL at 08:09

## 2022-01-01 RX ADMIN — LEVETIRACETAM 250 MG: 100 SOLUTION ORAL at 18:15

## 2022-01-01 RX ADMIN — EZETIMIBE 10 MG: 10 TABLET ORAL at 09:33

## 2022-01-01 RX ADMIN — METOPROLOL 12.5 MG: 25 TABLET ORAL at 09:13

## 2022-01-01 RX ADMIN — Medication 1 MG: at 09:13

## 2022-01-01 RX ADMIN — METFORMIN HYDROCHLORIDE 500 MG: 500 TABLET ORAL at 17:18

## 2022-01-01 RX ADMIN — LORAZEPAM 2 MG: 2 INJECTION INTRAMUSCULAR; INTRAVENOUS at 06:55

## 2022-01-01 RX ADMIN — MORPHINE SULFATE 10 MG: 10 SOLUTION ORAL at 03:50

## 2022-01-01 RX ADMIN — MORPHINE SULFATE 10 MG: 10 SOLUTION ORAL at 21:37

## 2022-01-01 RX ADMIN — CARBAMAZEPINE 200 MG: 200 TABLET, EXTENDED RELEASE ORAL at 08:14

## 2022-01-01 RX ADMIN — LEVETIRACETAM 250 MG: 100 SOLUTION ORAL at 09:37

## 2022-01-01 RX ADMIN — LEVETIRACETAM 500 MG: 500 TABLET, FILM COATED ORAL at 21:14

## 2022-01-01 RX ADMIN — DEXAMETHASONE 2 MG: 4 TABLET ORAL at 07:37

## 2022-01-01 RX ADMIN — HYDROMORPHONE HYDROCHLORIDE 2 MG: 2 INJECTION, SOLUTION INTRAMUSCULAR; INTRAVENOUS; SUBCUTANEOUS at 15:14

## 2022-01-01 RX ADMIN — MORPHINE SULFATE 10 MG: 10 SOLUTION ORAL at 04:18

## 2022-01-01 RX ADMIN — NASAL DECONGESTANT 2 SPRAY: 0.05 SPRAY NASAL at 08:13

## 2022-01-01 RX ADMIN — METFORMIN HYDROCHLORIDE 500 MG: 500 TABLET ORAL at 10:23

## 2022-01-01 RX ADMIN — HYDROMORPHONE HYDROCHLORIDE 0.5 MG: 1 INJECTION, SOLUTION INTRAMUSCULAR; INTRAVENOUS; SUBCUTANEOUS at 20:01

## 2022-01-01 RX ADMIN — METFORMIN HYDROCHLORIDE 500 MG: 500 TABLET ORAL at 08:11

## 2022-01-01 RX ADMIN — DOCUSATE SODIUM 50 MG AND SENNOSIDES 8.6 MG 2 TABLET: 8.6; 5 TABLET, FILM COATED ORAL at 09:11

## 2022-01-01 RX ADMIN — DEXAMETHASONE 2 MG: 4 TABLET ORAL at 09:36

## 2022-01-01 RX ADMIN — DEXAMETHASONE 2 MG: 2 TABLET ORAL at 17:01

## 2022-01-01 RX ADMIN — MORPHINE SULFATE 10 MG: 10 SOLUTION ORAL at 04:39

## 2022-01-01 RX ADMIN — LACOSAMIDE 100 MG: 100 TABLET, FILM COATED ORAL at 09:00

## 2022-01-01 RX ADMIN — METOPROLOL 25 MG: 25 TABLET ORAL at 08:35

## 2022-01-01 RX ADMIN — LORAZEPAM 2 MG: 2 INJECTION INTRAMUSCULAR; INTRAVENOUS at 09:05

## 2022-01-01 RX ADMIN — HYDROMORPHONE HYDROCHLORIDE 2 MG: 2 INJECTION, SOLUTION INTRAMUSCULAR; INTRAVENOUS; SUBCUTANEOUS at 23:16

## 2022-01-01 RX ADMIN — MORPHINE SULFATE 10 MG: 20 SOLUTION ORAL at 11:40

## 2022-01-01 RX ADMIN — GLIPIZIDE 5 MG: 5 TABLET ORAL at 08:33

## 2022-01-01 RX ADMIN — MICONAZOLE NITRATE: 20 CREAM TOPICAL at 08:48

## 2022-01-01 RX ADMIN — HYDROMORPHONE HYDROCHLORIDE 4 MG: 2 INJECTION, SOLUTION INTRAMUSCULAR; INTRAVENOUS; SUBCUTANEOUS at 07:31

## 2022-01-01 RX ADMIN — MICONAZOLE NITRATE: 20 CREAM TOPICAL at 09:12

## 2022-01-01 RX ADMIN — GLIPIZIDE 5 MG: 5 TABLET ORAL at 08:41

## 2022-01-01 RX ADMIN — PREGABALIN 100 MG: 50 CAPSULE ORAL at 21:28

## 2022-01-01 RX ADMIN — PREGABALIN 100 MG: 50 CAPSULE ORAL at 09:13

## 2022-01-01 RX ADMIN — OXYMETAZOLINE HCL 2 SPRAY: 0.05 SPRAY NASAL at 19:00

## 2022-01-01 RX ADMIN — CARBAMAZEPINE 200 MG: 200 TABLET ORAL at 08:09

## 2022-01-01 RX ADMIN — LORAZEPAM 1 MG: 1 TABLET ORAL at 12:06

## 2022-01-01 RX ADMIN — HYDROMORPHONE HYDROCHLORIDE 4 MG: 2 INJECTION, SOLUTION INTRAMUSCULAR; INTRAVENOUS; SUBCUTANEOUS at 13:04

## 2022-01-01 RX ADMIN — LORAZEPAM 1 MG: 1 TABLET ORAL at 17:18

## 2022-01-01 RX ADMIN — GLYCOPYRROLATE 0.2 MG: 0.2 INJECTION, SOLUTION INTRAMUSCULAR; INTRAVENOUS at 15:13

## 2022-01-01 RX ADMIN — DOCUSATE SODIUM 50 MG AND SENNOSIDES 8.6 MG 2 TABLET: 8.6; 5 TABLET, FILM COATED ORAL at 10:03

## 2022-01-01 RX ADMIN — METOPROLOL 12.5 MG: 25 TABLET ORAL at 09:33

## 2022-01-01 RX ADMIN — LEVETIRACETAM 500 MG: 500 TABLET, FILM COATED ORAL at 17:01

## 2022-01-01 RX ADMIN — LEVETIRACETAM 250 MG: 100 SOLUTION ORAL at 10:33

## 2022-01-01 RX ADMIN — LORAZEPAM 2 MG: 2 INJECTION INTRAMUSCULAR; INTRAVENOUS at 03:59

## 2022-01-01 RX ADMIN — LACOSAMIDE 100 MG: 100 TABLET, FILM COATED ORAL at 08:36

## 2022-01-01 RX ADMIN — METFORMIN HYDROCHLORIDE 500 MG: 500 TABLET ORAL at 18:17

## 2022-01-01 RX ADMIN — LORAZEPAM 1 MG: 1 TABLET ORAL at 17:08

## 2022-01-01 RX ADMIN — CARBAMAZEPINE 200 MG: 200 TABLET, EXTENDED RELEASE ORAL at 08:39

## 2022-01-01 RX ADMIN — EZETIMIBE 10 MG: 10 TABLET ORAL at 08:41

## 2022-01-01 RX ADMIN — GLIPIZIDE 5 MG: 5 TABLET ORAL at 09:12

## 2022-01-01 RX ADMIN — OMEPRAZOLE 20 MG: 20 CAPSULE, DELAYED RELEASE ORAL at 07:30

## 2022-01-01 RX ADMIN — OMEPRAZOLE 20 MG: 20 CAPSULE, DELAYED RELEASE ORAL at 07:55

## 2022-01-01 RX ADMIN — DOCUSATE SODIUM 50MG AND SENNOSIDES 8.6MG 1 TABLET: 8.6; 5 TABLET, FILM COATED ORAL at 07:35

## 2022-01-01 RX ADMIN — NASAL DECONGESTANT 2 SPRAY: 0.05 SPRAY NASAL at 09:11

## 2022-01-01 RX ADMIN — LORAZEPAM 2 MG: 2 INJECTION INTRAMUSCULAR; INTRAVENOUS at 07:52

## 2022-01-01 RX ADMIN — DEXAMETHASONE 2 MG: 2 TABLET ORAL at 12:27

## 2022-01-01 RX ADMIN — LEVETIRACETAM 500 MG: 500 TABLET, FILM COATED ORAL at 08:49

## 2022-01-01 RX ADMIN — NASAL DECONGESTANT 2 SPRAY: 0.05 SPRAY NASAL at 10:02

## 2022-01-01 RX ADMIN — LISINOPRIL 10 MG: 5 TABLET ORAL at 08:11

## 2022-01-01 RX ADMIN — OXYCODONE HYDROCHLORIDE 10 MG: 5 TABLET ORAL at 21:14

## 2022-01-01 RX ADMIN — DEXAMETHASONE 2 MG: 2 TABLET ORAL at 09:12

## 2022-01-01 RX ADMIN — CARBAMAZEPINE 200 MG: 200 TABLET ORAL at 17:34

## 2022-01-01 RX ADMIN — CARBAMAZEPINE 200 MG: 200 TABLET ORAL at 09:12

## 2022-01-01 RX ADMIN — HYDROMORPHONE HYDROCHLORIDE 6 MG: 2 INJECTION, SOLUTION INTRAMUSCULAR; INTRAVENOUS; SUBCUTANEOUS at 15:13

## 2022-01-01 RX ADMIN — LACOSAMIDE 100 MG: 100 TABLET, FILM COATED ORAL at 17:59

## 2022-01-01 RX ADMIN — MORPHINE SULFATE 10 MG: 10 SOLUTION ORAL at 12:32

## 2022-01-01 RX ADMIN — DEXAMETHASONE 2 MG: 2 TABLET ORAL at 17:34

## 2022-01-01 RX ADMIN — DEXAMETHASONE 2 MG: 4 TABLET ORAL at 12:00

## 2022-01-01 RX ADMIN — METOPROLOL 12.5 MG: 25 TABLET ORAL at 17:19

## 2022-01-01 RX ADMIN — CARBAMAZEPINE 200 MG: 200 TABLET ORAL at 17:21

## 2022-01-01 RX ADMIN — MORPHINE SULFATE 10 MG: 10 SOLUTION ORAL at 18:08

## 2022-01-01 RX ADMIN — LORAZEPAM 4 MG: 2 INJECTION INTRAMUSCULAR; INTRAVENOUS at 11:02

## 2022-01-01 RX ADMIN — PREGABALIN 100 MG: 50 CAPSULE ORAL at 10:24

## 2022-01-01 RX ADMIN — METOPROLOL 25 MG: 25 TABLET ORAL at 07:39

## 2022-01-01 RX ADMIN — METOPROLOL 12.5 MG: 25 TABLET ORAL at 17:11

## 2022-01-01 RX ADMIN — HYDROMORPHONE HYDROCHLORIDE 4 MG: 2 INJECTION, SOLUTION INTRAMUSCULAR; INTRAVENOUS; SUBCUTANEOUS at 09:34

## 2022-01-01 RX ADMIN — LORAZEPAM 1 MG: 1 TABLET ORAL at 00:27

## 2022-01-01 RX ADMIN — LORAZEPAM 1 MG: 1 TABLET ORAL at 12:46

## 2022-01-01 RX ADMIN — Medication 1 MG: at 01:55

## 2022-01-01 RX ADMIN — LEVETIRACETAM 500 MG: 500 TABLET, FILM COATED ORAL at 18:22

## 2022-01-01 RX ADMIN — LACOSAMIDE 100 MG: 100 TABLET, FILM COATED ORAL at 20:38

## 2022-01-01 RX ADMIN — MORPHINE SULFATE 10 MG: 10 SOLUTION ORAL at 13:24

## 2022-01-01 RX ADMIN — DEXAMETHASONE 2 MG: 2 TABLET ORAL at 08:10

## 2022-01-01 RX ADMIN — LORAZEPAM 2 MG: 2 INJECTION INTRAMUSCULAR; INTRAVENOUS at 03:56

## 2022-01-01 RX ADMIN — TRAMADOL HYDROCHLORIDE 50 MG: 50 TABLET, FILM COATED ORAL at 07:36

## 2022-01-01 RX ADMIN — CARBAMAZEPINE 200 MG: 200 TABLET, EXTENDED RELEASE ORAL at 08:32

## 2022-01-01 RX ADMIN — KETOROLAC TROMETHAMINE 30 MG: 30 INJECTION, SOLUTION INTRAMUSCULAR at 08:34

## 2022-01-01 RX ADMIN — OXYCODONE HYDROCHLORIDE 10 MG: 5 TABLET ORAL at 21:36

## 2022-01-01 RX ADMIN — METFORMIN HYDROCHLORIDE 500 MG: 500 TABLET ORAL at 07:38

## 2022-01-01 RX ADMIN — MORPHINE SULFATE 10 MG: 10 SOLUTION ORAL at 21:51

## 2022-01-01 RX ADMIN — HYDROMORPHONE HYDROCHLORIDE 2 MG: 2 INJECTION, SOLUTION INTRAMUSCULAR; INTRAVENOUS; SUBCUTANEOUS at 06:39

## 2022-01-01 RX ADMIN — HYDROMORPHONE HYDROCHLORIDE 4 MG: 2 INJECTION, SOLUTION INTRAMUSCULAR; INTRAVENOUS; SUBCUTANEOUS at 06:26

## 2022-01-01 RX ADMIN — EZETIMIBE 10 MG: 10 TABLET ORAL at 09:14

## 2022-01-01 RX ADMIN — PREGABALIN 100 MG: 50 CAPSULE ORAL at 08:35

## 2022-01-01 RX ADMIN — LORAZEPAM 1 MG: 1 TABLET ORAL at 04:24

## 2022-01-01 RX ADMIN — CARBAMAZEPINE 200 MG: 200 TABLET, EXTENDED RELEASE ORAL at 17:43

## 2022-01-01 RX ADMIN — LORAZEPAM 1 MG: 1 TABLET ORAL at 23:19

## 2022-01-01 RX ADMIN — HYDROMORPHONE HYDROCHLORIDE 0.5 MG: 1 INJECTION, SOLUTION INTRAMUSCULAR; INTRAVENOUS; SUBCUTANEOUS at 16:42

## 2022-01-01 RX ADMIN — LORAZEPAM 2 MG: 2 INJECTION INTRAMUSCULAR; INTRAVENOUS at 20:01

## 2022-01-01 RX ADMIN — Medication 1 MG: at 09:30

## 2022-01-01 RX ADMIN — SERTRALINE 50 MG: 50 TABLET, FILM COATED ORAL at 08:50

## 2022-01-01 RX ADMIN — CARBAMAZEPINE 200 MG: 200 TABLET ORAL at 18:22

## 2022-01-01 RX ADMIN — LORAZEPAM 2 MG: 2 INJECTION INTRAMUSCULAR; INTRAVENOUS at 23:40

## 2022-01-01 RX ADMIN — LEVETIRACETAM 500 MG: 500 TABLET, FILM COATED ORAL at 09:15

## 2022-01-01 RX ADMIN — LACOSAMIDE 100 MG: 100 TABLET, FILM COATED ORAL at 08:10

## 2022-01-01 RX ADMIN — CARBAMAZEPINE 200 MG: 200 TABLET ORAL at 09:14

## 2022-01-01 RX ADMIN — HYDROMORPHONE HYDROCHLORIDE 4 MG: 2 INJECTION, SOLUTION INTRAMUSCULAR; INTRAVENOUS; SUBCUTANEOUS at 20:11

## 2022-01-01 RX ADMIN — NASAL DECONGESTANT 2 SPRAY: 0.05 SPRAY NASAL at 16:53

## 2022-01-01 RX ADMIN — OXYCODONE HYDROCHLORIDE 10 MG: 5 TABLET ORAL at 14:11

## 2022-01-01 RX ADMIN — LACOSAMIDE 100 MG: 100 TABLET, FILM COATED ORAL at 17:34

## 2022-01-01 RX ADMIN — LEVETIRACETAM 500 MG: 500 TABLET, FILM COATED ORAL at 09:33

## 2022-01-01 RX ADMIN — PREGABALIN 100 MG: 50 CAPSULE ORAL at 08:44

## 2022-01-01 RX ADMIN — Medication 1 MG: at 01:30

## 2022-01-01 RX ADMIN — CARBAMAZEPINE 200 MG: 200 TABLET ORAL at 10:18

## 2022-01-01 RX ADMIN — SERTRALINE 50 MG: 50 TABLET, FILM COATED ORAL at 09:59

## 2022-01-01 RX ADMIN — METOPROLOL 12.5 MG: 25 TABLET ORAL at 17:34

## 2022-01-01 RX ADMIN — LEVETIRACETAM 500 MG: 500 TABLET, FILM COATED ORAL at 09:12

## 2022-01-01 RX ADMIN — LEVETIRACETAM 250 MG: 100 SOLUTION ORAL at 21:09

## 2022-01-01 RX ADMIN — DOCUSATE SODIUM 50 MG AND SENNOSIDES 8.6 MG 2 TABLET: 8.6; 5 TABLET, FILM COATED ORAL at 17:01

## 2022-01-01 RX ADMIN — METFORMIN HYDROCHLORIDE 500 MG: 500 TABLET ORAL at 17:09

## 2022-01-01 RX ADMIN — LORAZEPAM 1 MG: 1 TABLET ORAL at 08:45

## 2022-01-01 RX ADMIN — LEVETIRACETAM 500 MG: 500 TABLET, FILM COATED ORAL at 09:37

## 2022-01-01 RX ADMIN — DEXAMETHASONE 2 MG: 2 TABLET ORAL at 17:10

## 2022-01-01 RX ADMIN — LORAZEPAM 1 MG: 1 TABLET ORAL at 20:18

## 2022-01-01 RX ADMIN — NASAL DECONGESTANT 2 SPRAY: 0.05 SPRAY NASAL at 08:45

## 2022-01-01 RX ADMIN — PREGABALIN 100 MG: 50 CAPSULE ORAL at 20:19

## 2022-01-01 RX ADMIN — DEXAMETHASONE 2 MG: 2 TABLET ORAL at 09:33

## 2022-01-01 RX ADMIN — PREGABALIN 100 MG: 50 CAPSULE ORAL at 20:07

## 2022-01-01 RX ADMIN — LEVETIRACETAM 500 MG: 500 TABLET, FILM COATED ORAL at 09:00

## 2022-01-01 RX ADMIN — SERTRALINE 50 MG: 50 TABLET, FILM COATED ORAL at 08:44

## 2022-01-01 RX ADMIN — METFORMIN HYDROCHLORIDE 500 MG: 500 TABLET ORAL at 08:50

## 2022-01-01 RX ADMIN — EZETIMIBE 10 MG: 10 TABLET ORAL at 09:12

## 2022-01-01 RX ADMIN — CARBAMAZEPINE 200 MG: 200 TABLET ORAL at 16:54

## 2022-01-01 RX ADMIN — LISINOPRIL 10 MG: 5 TABLET ORAL at 10:21

## 2022-01-01 RX ADMIN — GLIPIZIDE 5 MG: 5 TABLET ORAL at 09:33

## 2022-01-01 RX ADMIN — Medication 1 MG: at 13:24

## 2022-01-01 RX ADMIN — LACOSAMIDE 100 MG: 100 TABLET, FILM COATED ORAL at 18:21

## 2022-01-01 RX ADMIN — LACOSAMIDE 100 MG: 100 TABLET, FILM COATED ORAL at 17:20

## 2022-01-01 RX ADMIN — LACOSAMIDE 100 MG: 100 TABLET, FILM COATED ORAL at 21:11

## 2022-01-01 RX ADMIN — LACOSAMIDE 100 MG: 100 TABLET, FILM COATED ORAL at 21:04

## 2022-01-01 RX ADMIN — LISINOPRIL 10 MG: 5 TABLET ORAL at 08:41

## 2022-01-01 RX ADMIN — Medication 1 MG: at 08:48

## 2022-01-01 RX ADMIN — LEVETIRACETAM 500 MG: 500 TABLET, FILM COATED ORAL at 17:43

## 2022-01-01 RX ADMIN — OXYCODONE HYDROCHLORIDE 20 MG: 10 TABLET, FILM COATED, EXTENDED RELEASE ORAL at 09:42

## 2022-01-01 RX ADMIN — LISINOPRIL 10 MG: 5 TABLET ORAL at 09:33

## 2022-01-01 RX ADMIN — GLIPIZIDE 5 MG: 5 TABLET ORAL at 07:30

## 2022-01-01 RX ADMIN — HYDROMORPHONE HYDROCHLORIDE 2 MG: 2 INJECTION, SOLUTION INTRAMUSCULAR; INTRAVENOUS; SUBCUTANEOUS at 14:59

## 2022-01-01 RX ADMIN — PREGABALIN 100 MG: 50 CAPSULE ORAL at 08:14

## 2022-01-01 RX ADMIN — LEVETIRACETAM 500 MG: 500 TABLET, FILM COATED ORAL at 17:34

## 2022-01-01 RX ADMIN — NASAL DECONGESTANT 2 SPRAY: 0.05 SPRAY NASAL at 17:08

## 2022-01-01 RX ADMIN — LACOSAMIDE 100 MG: 100 TABLET, FILM COATED ORAL at 21:30

## 2022-01-01 RX ADMIN — LORAZEPAM 1 MG: 1 TABLET ORAL at 09:32

## 2022-01-01 RX ADMIN — NASAL DECONGESTANT 2 SPRAY: 0.05 SPRAY NASAL at 17:01

## 2022-01-01 RX ADMIN — DEXAMETHASONE 2 MG: 2 TABLET ORAL at 16:54

## 2022-01-01 RX ADMIN — MORPHINE SULFATE 10 MG: 10 SOLUTION ORAL at 13:12

## 2022-01-01 RX ADMIN — LEVETIRACETAM 500 MG: 500 TABLET, FILM COATED ORAL at 08:11

## 2022-01-01 RX ADMIN — HYDROMORPHONE HYDROCHLORIDE 4 MG: 2 INJECTION, SOLUTION INTRAMUSCULAR; INTRAVENOUS; SUBCUTANEOUS at 03:55

## 2022-01-01 RX ADMIN — GLIPIZIDE 5 MG: 5 TABLET ORAL at 07:38

## 2022-01-01 RX ADMIN — DEXAMETHASONE 2 MG: 2 TABLET ORAL at 10:00

## 2022-01-01 RX ADMIN — HYDROMORPHONE HYDROCHLORIDE 4 MG: 2 INJECTION, SOLUTION INTRAMUSCULAR; INTRAVENOUS; SUBCUTANEOUS at 11:02

## 2022-01-01 RX ADMIN — EZETIMIBE 10 MG: 10 TABLET ORAL at 09:00

## 2022-01-01 RX ADMIN — LORAZEPAM 1 MG: 1 TABLET ORAL at 08:09

## 2022-01-01 RX ADMIN — METFORMIN HYDROCHLORIDE 500 MG: 500 TABLET ORAL at 18:00

## 2022-01-01 RX ADMIN — SERTRALINE 50 MG: 50 TABLET, FILM COATED ORAL at 08:13

## 2022-01-01 RX ADMIN — CARBAMAZEPINE 200 MG: 200 TABLET ORAL at 18:16

## 2022-01-01 RX ADMIN — LEVETIRACETAM 500 MG: 500 TABLET, FILM COATED ORAL at 08:34

## 2022-01-01 RX ADMIN — MORPHINE SULFATE 10 MG: 10 SOLUTION ORAL at 01:59

## 2022-01-01 RX ADMIN — MORPHINE SULFATE 10 MG: 10 SOLUTION ORAL at 05:19

## 2022-01-01 RX ADMIN — METOPROLOL 25 MG: 25 TABLET ORAL at 17:44

## 2022-01-01 RX ADMIN — DEXAMETHASONE 2 MG: 2 TABLET ORAL at 12:20

## 2022-01-01 RX ADMIN — METOPROLOL 12.5 MG: 25 TABLET ORAL at 08:44

## 2022-01-01 RX ADMIN — METOPROLOL 25 MG: 25 TABLET ORAL at 09:15

## 2022-01-01 RX ADMIN — LACOSAMIDE 100 MG: 100 TABLET, FILM COATED ORAL at 17:01

## 2022-01-01 RX ADMIN — LORAZEPAM 1 MG: 1 TABLET ORAL at 04:19

## 2022-01-01 RX ADMIN — MORPHINE SULFATE 10 MG: 10 SOLUTION ORAL at 09:31

## 2022-01-01 RX ADMIN — EZETIMIBE 10 MG: 10 TABLET ORAL at 08:33

## 2022-01-01 RX ADMIN — PREGABALIN 50 MG: 50 CAPSULE ORAL at 21:00

## 2022-01-01 RX ADMIN — LACOSAMIDE 100 MG: 100 TABLET, FILM COATED ORAL at 17:43

## 2022-01-01 RX ADMIN — METFORMIN HYDROCHLORIDE 500 MG: 500 TABLET ORAL at 09:13

## 2022-01-01 RX ADMIN — LEVETIRACETAM 500 MG: 500 TABLET, FILM COATED ORAL at 09:59

## 2022-01-01 RX ADMIN — DEXAMETHASONE 2 MG: 4 TABLET ORAL at 17:43

## 2022-01-01 RX ADMIN — CARBAMAZEPINE 200 MG: 200 TABLET ORAL at 09:36

## 2022-01-01 RX ADMIN — DOCUSATE SODIUM 50 MG AND SENNOSIDES 8.6 MG 2 TABLET: 8.6; 5 TABLET, FILM COATED ORAL at 17:00

## 2022-01-01 RX ADMIN — OMEPRAZOLE 20 MG: 20 CAPSULE, DELAYED RELEASE ORAL at 09:15

## 2022-01-01 RX ADMIN — LACOSAMIDE 100 MG: 100 TABLET, FILM COATED ORAL at 08:40

## 2022-01-01 RX ADMIN — PREGABALIN 100 MG: 50 CAPSULE ORAL at 10:00

## 2022-01-01 RX ADMIN — DEXAMETHASONE SODIUM PHOSPHATE 4 MG: 4 INJECTION, SOLUTION INTRAMUSCULAR; INTRAVENOUS at 07:55

## 2022-01-01 RX ADMIN — Medication 4 MG: at 13:29

## 2022-01-01 RX ADMIN — METOPROLOL 25 MG: 25 TABLET ORAL at 09:00

## 2022-01-01 RX ADMIN — METOPROLOL 12.5 MG: 25 TABLET ORAL at 16:54

## 2022-01-01 RX ADMIN — LORAZEPAM 1 MG: 1 TABLET ORAL at 12:38

## 2022-01-01 RX ADMIN — OXYCODONE HYDROCHLORIDE 10 MG: 5 TABLET ORAL at 18:15

## 2022-01-01 RX ADMIN — LISINOPRIL 10 MG: 5 TABLET ORAL at 08:49

## 2022-01-01 RX ADMIN — DEXAMETHASONE 2 MG: 4 TABLET ORAL at 09:14

## 2022-01-01 RX ADMIN — BETAMETHASONE DIPROPIONATE: 0.5 OINTMENT, AUGMENTED TOPICAL at 09:00

## 2022-01-01 RX ADMIN — LISINOPRIL 10 MG: 5 TABLET ORAL at 09:38

## 2022-01-01 RX ADMIN — LEVETIRACETAM 500 MG: 500 TABLET, FILM COATED ORAL at 17:20

## 2022-01-01 RX ADMIN — METFORMIN HYDROCHLORIDE 500 MG: 500 TABLET ORAL at 17:01

## 2022-01-01 RX ADMIN — METOPROLOL 12.5 MG: 25 TABLET ORAL at 08:10

## 2022-01-01 RX ADMIN — OXYCODONE HYDROCHLORIDE 10 MG: 5 TABLET ORAL at 13:58

## 2022-01-01 RX ADMIN — OXYCODONE HYDROCHLORIDE 10 MG: 5 TABLET ORAL at 09:13

## 2022-01-01 RX ADMIN — CARBAMAZEPINE 200 MG: 200 TABLET ORAL at 17:01

## 2022-01-01 RX ADMIN — METOPROLOL 12.5 MG: 25 TABLET ORAL at 09:59

## 2022-01-01 RX ADMIN — MORPHINE SULFATE 10 MG: 10 SOLUTION ORAL at 13:57

## 2022-01-01 RX ADMIN — CARBAMAZEPINE 200 MG: 200 TABLET ORAL at 08:49

## 2022-01-01 RX ADMIN — HYDROMORPHONE HYDROCHLORIDE 4 MG: 2 INJECTION, SOLUTION INTRAMUSCULAR; INTRAVENOUS; SUBCUTANEOUS at 07:48

## 2022-01-01 RX ADMIN — OXYCODONE HYDROCHLORIDE 10 MG: 5 TABLET ORAL at 01:55

## 2022-01-01 RX ADMIN — METFORMIN HYDROCHLORIDE 500 MG: 500 TABLET ORAL at 17:34

## 2022-01-01 RX ADMIN — PREGABALIN 50 MG: 50 CAPSULE ORAL at 21:29

## 2022-01-01 RX ADMIN — METFORMIN HYDROCHLORIDE 500 MG: 500 TABLET ORAL at 18:21

## 2022-01-01 RX ADMIN — LORAZEPAM 1 MG: 1 TABLET ORAL at 17:42

## 2022-01-01 RX ADMIN — HYDROMORPHONE HYDROCHLORIDE 2 MG: 2 INJECTION, SOLUTION INTRAMUSCULAR; INTRAVENOUS; SUBCUTANEOUS at 11:56

## 2022-01-01 RX ADMIN — OMEPRAZOLE 20 MG: 20 CAPSULE, DELAYED RELEASE ORAL at 09:59

## 2022-01-01 RX ADMIN — LACOSAMIDE 100 MG: 100 TABLET, FILM COATED ORAL at 08:11

## 2022-01-01 RX ADMIN — DEXAMETHASONE 2 MG: 4 TABLET ORAL at 18:18

## 2022-01-01 RX ADMIN — DEXAMETHASONE 2 MG: 4 TABLET ORAL at 21:13

## 2022-01-01 RX ADMIN — METFORMIN HYDROCHLORIDE 500 MG: 500 TABLET ORAL at 08:44

## 2022-01-01 RX ADMIN — OMEPRAZOLE 20 MG: 20 CAPSULE, DELAYED RELEASE ORAL at 07:36

## 2022-01-05 NOTE — HOSPICE
Pt received sitting up in bed with Grandmother and Aunt at the home. Pt denies any pain and states she is eating well and has daily BMs with last yesterday. Pt states she has had some itching after going to the bathroom in gluteal, rectal, and vaginal areas. Minimal redness noted to gluteal crease and vulva areas and large hemorrhoids observed. Patient encouraged to use her eczema cream to vulva and gluteal crease and hemorrhoid cream to rectal area. Grandmother and patient verbalize understanding. Pt and family instructed that hospice is avialable 24/7 and they verbalize understanding.

## 2022-01-10 NOTE — HOSPICE
Upon nurse arrival patient just awakening for the day. Patient's grandmother at bedside for the visit. Patient reports she has several questions but can't remember them. Caregiver and patient encouraged to write questions down for the next nurse visit or to call hospice as needed. Patient shown by this nurse how to create a message on her cell phone by using the microphone feature. Patient and caregiver appreciative of visit and education. No further care needs expressed.

## 2022-01-17 NOTE — HOSPICE
Pt received sitting up on side of bed and in NAD. Pt states she just got off of bedside commode. Yellow urine observed in bedside commode. Pt states she is having daily, normal bowel movements and is eating 2 meals and snacks per day. Pt states she is feeling tired and states she thinks it is because of her medication. Pt has blood blister to left lower, anterior leg and reddened, enflamed hemorrhoids. Hemorrhoid cream applied to rectal area and grandmother given foam pads in case blood blister opens. Medications reviewed and no medications needed per patient's Aunt and Grandmother. Pt and family instructed that hospice is avialable 24/7 and they verbalize understanding.

## 2022-01-24 NOTE — HOSPICE
Patient sitting upright in bed upon nurse arrival, denies pain. Complains of vaginal itching, denies vaginal discharge. Assessment reveals redness at bilateral groin folds, redness of bilateral labia majora, small red bump on left labia majora. German Norris, NP informed. New Orders given for Diflucan 150mg PO, daily x3 days, start first dose today. Miconazole nitrate 200 mg/5 gram (4 %) vaginal cream. Apply to external vaginal area two times a day for 7 days. Medications to be delivered from [facility] pharmacy today. Patient and caregivers encouraged to call hospice as needed for further concerns. New insurance information updated. RNCM informed of new orders.

## 2022-01-27 NOTE — HOSPICE
MSW had visit with patient; patient's aunt and grandmother also present for visit. Patient shared in life review, stated that she is tired and ready to go, and does not know why she is still here. MSW gave patient space to explore her feelings, MSW provided emotional support through active listening and reassured patient that it is natural for her to have those thoughts, acknowledge that she has been through a lot in her illness. Patient appreciative of visit and support from MSW, open to continuing visits. Patient's grandmother continues to try to encourage her to get up and do more. MSW discussed disease progress and how patient can reserve her energy for doing things that she wants to do. Patient appreciative of support on the matter. Patient's aunt also shared that UAI has been completed and that family wants patient's grandmother to be the patient's Medicaid aide. MSW educated on facilitators and will make visit next week to review that information with family as aunt did not have paperwork together for it on last visit.

## 2022-01-31 NOTE — HOSPICE
Pt received resting on stretcher and in NAD with grandmother at the home /bedside. Grandmother states patient not feeling well today. Pt states she has achy pain all over. Observeed redness to left lower posterior leg near blood blister and bruising to entire left foot. Pt states she is not sure of she injured her foot and grandmother states she did not tell her she injured her foot. Pt has chronic weakness and decreased sensation to left leg s/p CVA. Dina Granados NP called and [facility] for cepahalexin and tramadol received and ordered. Pt has some redness and bumps to vaginal area and grandmother states is a little better since she took diflucan but she states they never received topical anti-fungal mediaction. Ordered topical mediaction as well. Pt c/o dizziness as she was helped to sitting position but resolved after a minute. Patient instructed on slow position changes to reduce dizziness. Hemorrhoids present but no longer reddened and enflamed. Dried blood around blood blister area. Wound cleansed and foam dressing applied. Pt tolerated well. Patient states not as hungry for a couple of days but had some soup last night. Pt states last BM was today. Patient's grandmother states she will  patient;s prescriptions. Pt and grandmother instructed that hospice is available 24/7 and they verbalize understanding.

## 2022-02-08 NOTE — HOSPICE
Upon arrival patient sitting up in bed playing games on her cellSiSense. Caregiver Rafa Tadeo and St. leger at bedside during visit. Reports pain earlier this AM in her left leg, but now improved stating pain is a 1/10, denies SOB. Caregivers report utilizing Morphine for pain in the last two days related to pain unrelieved by Tylenol. Analgesic medication regimen reviewed with caregivers Rafa Tadeo and [facility]. leger on Tylenol, Tramadol and Morphine use with verbalized understanding from both caregivers. Patient expressed that she knows her disease is getting worse, and her brain is not communicating with her body. Nurse reviewed diagnosis with patient and caregivers, offered empathetic listening. At this time patient requesting a visit from  [facility] and spiritual support visit to discuss her life. Ms. John Yousif is expressing that she is \"tired of living\" and \"going through all of this\". Ms. Lopez Benja reports that she is considering stopping all of her medications, and wants to ensure that she is not committing suicide if she chooses to do that. This nurse informed her that the hospice team would support her choice in her care decisions, in conjunction with her caregivers if she chooses to stop her medication and only use medication to keep her comfortable. Patient reports that she is not suicidal and does not want to harm herself. She acknowledges that her PCP informed her that her condition is terminal and there is nothing else that can be done. Patient and caregiver report she has experienced seizure in the last week, caregivers report they have noticed an increase in her twitching/tremors of right arm. Patient reports left arm has increased tremors/twitching. Seizure precautions reviwed with caregivers, and the use of Lorazepam. Caregivers and patient verbalized understanding of seizure medication regimen. Alexa Melo, JUANIS informed, new orders obtained for scheduled Lorazepam, and to increase Morphine to 10mg PO PRN.  Tramadol 50 mg tabs (14), and Lorazepam 1mg tabs (30) called into local [facility], spoke with pharmacist Blane Fallon. Family to [facility] medicine  today. SAUMYA, Yvan, and [facility] informed via electronic message for follow up.

## 2022-02-10 NOTE — HOSPICE
Problem is spiritual distress related to Ms. Contreras's awareness of her cognitive and physical decline. Goal is to acknowledge her grief and provide reassurance that her life has meaning and value. The hospice chaplain responded to Ms. Contreras's request for a visit. Ms. Carmela Leavitt appeared sad and exhausted. She acknowledged signs of physical and cognitive changes since she last met with the hospice chaplain. Ms. Stacie Rose affect was flat to negative. The hospice chaplain also observed that Ms. Carmela Leavitt appeared exhausted. The hospice chaplain provided space for Ms. Contreras to explore her thoughts and feelings. Her aunt and grandmothere were also present and helped her to articulate her needs and concerns. The hospice chaplain attempted to affirm Ms. Contreras's identiity as God's beloved through life review and prayer. Ms. Carmela Leavitt responded positively to the prayer with a smile. Ms. Stacie Rose family would like to schedule regular 's visits. The plan for the next two weeks is to visit Ms. Contreras.

## 2022-02-13 NOTE — PROGRESS NOTES
1445-pt arrived to [facility] via ambulance transport; pt alert and oriented to person, place and situation with some confusion and aphasia; pt on room air; transferred into bed; depends changed; pt made comfortable; full assessment completed; wound care completed ; pt rates pain 8/10 in her left hip   1505-PRN morphine administered    1530-pt oriented to [facility] and room and made comfortable in bed  1630-Xray to left hip ordered through [facility]; pt reports that her pain is now a 0/10  1645-depends changed of urinary incontinence  1715-pt assisted to sit up in bed and eat dinner; pt ate 100%  1800-pt changed of urinary incontinence and turned onto her right side   1830-bedside Xray completed; pt made comfortable back on her right side  1900-Report given to GEOVANNI Bliss     NAME OF PATIENT:  Katie Blas    LEVEL OF CARE:  respite    REASON FOR GIP:   n/a    *PATIENT REMAINS ELIGIBLE FOR [facility] LEVEL OF CARE AS EVIDENCED BY: (MUST BE ADDRESSED OF PATIENT GIP)      REASON FOR RESPITE:  Caregiver breakdown    O2 SAFETY:  n/a    FALL INTERVENTIONS PROVIDED:   Implemented/recommended use of non-skid footwear, Implemented/recommended resources for alarm system (personal alarm, bed alarm, call bell, etc.) , Implemented/recommended environmental changes (remove hazards, lower bed, improve lighting, etc.) and Implemented/recommended increased supervision/assistance    INTERDISPLINARY COMMUNICATION/COLLABORATION:  Physician, MSW, [place] and RN, CNA    NEW MEDICATION INITIATION DOCUMENTATION:  n/a    Reason medication is being initiated:  n/a    MD / Provider name consulted re: change in status / initiation of new medication:  n/a    New Symptom(s):  n/a    New Order(s):  n/a    Name of the person notified of the changes:  n/a    Name of person being taught:  n/a    Instructions given:  n/a    Side Effects taught:  n/a    Response to teaching:  n/a      COMFORTABLE DYING MEASURE:  Is Patient/family satisfied with symptom level?  yes    DISCHARGE PLAN:  Home on 2/18/22

## 2022-02-13 NOTE — PROGRESS NOTES
Problem: Pain  Goal: *Control of acute pain  Outcome: Progressing Towards Goal  Note: Upon arrival to [facility], pt rated her pain 8/10. Patient's pain resolved to a 0/10 following administration of Roxanol. Continue to assess, monitor and treat per POC. Problem: Communication Deficit  Goal: Effectively communicate symptoms, needs, and concerns  Description: Patient/family/caregiver will effectively communicate symptoms, needs and concerns. Outcome: Progressing Towards Goal  Note: Patient has expressive aphasia and requires extra time and patients in order to communicate effectively. Continue to provide support and time for patient to express her needs. Problem: General Wound Care  Goal: *Non-infected wound: Improvement of existing wound, absence of infection, and maintenance of skin integrity  Outcome: Progressing Towards Goal  Note: Wound care completed today upon admission to [facility]. No signs of infection were observed. Wound care orders entered into pts POC. Complete dressing changes every other day and observe for signs of infection.

## 2022-02-13 NOTE — HOSPICE
LCSW received word from triage RN, Francis Childress that family is in need of respite starting today due to caregiver exhaustion. Bed available and Covid test ordered. LCSW reached out to pt's aunt, Ese Ho to discuss further. LCSW able to send Ms. Miranda the necessary paperwork to sign via [email or web address] Brito shared that her mother, Malu Thomas (age [de-identified]), pt's grandmother is the primary caregiver at this time. Buddy Root has been able to work mostly at home but will need to return to the office starting this week. Pt has reportedly been falling up to 4x per day, because despite never being alone and having adequate assistive devices, she continues to try to get up on her own to use the restroom. Per Buddy Root, pt's left side has become increasingly weak, but pt is cognitively still alert though certainly with periods of confusion. We discussed how difficult it has been with pt not calling out for assistance when the family is able and offering to provide her with incontinence care. Pt does have LTC Medicaid and LCSW reviewed her eligibility for NH placement vs. in home caregivers - family had been pursuing consumer directed care. Family is not ready to commit yet, but LCSW shared that typically respite time is used as much as possible to develop a better care plan at home. LCSW let Buddy Root know that respite can also be used again if needed to give the family another break if it helps them to regain some strength to keep pt at home. Buddy Root to discuss with family and consider options. She is aware that plan will be for dc home on Friday. LCSW provided hand off to primary  for follow up.

## 2022-02-14 NOTE — PROGRESS NOTES
Problem: Pressure Injury - Risk of  Goal: *Prevention of pressure injury  Description: Document Anderson Scale and appropriate interventions in the flowsheet. Outcome: Progressing Towards Goal  Note: Pressure Injury Interventions:  Sensory Interventions: Assess changes in LOC,Check visual cues for pain,Pressure redistribution bed/mattress (bed type),Avoid rigorous massage over bony prominences,Float heels,Keep linens dry and wrinkle-free,Minimize linen layers    Moisture Interventions: Apply protective barrier, creams and emollients,Absorbent underpads,Check for incontinence Q2 hours and as needed,Maintain skin hydration (lotion/cream)    Activity Interventions: Pressure redistribution bed/mattress(bed type)    Mobility Interventions: Pressure redistribution bed/mattress (bed type),Float heels    Nutrition Interventions: Document food/fluid/supplement intake,Offer support with meals,snacks and hydration    Friction and Shear Interventions: Apply protective barrier, creams and emollients,Foam dressings/transparent film/skin sealants,Minimize layers                Problem: Risk for Falls  Goal: Free of falls during inpatient stay  Description: Patient will be free of falls during inpatient stay.   Outcome: Progressing Towards Goal

## 2022-02-14 NOTE — PROGRESS NOTES
NAME OF PATIENT:  Kacey Marmolejo    LEVEL OF CARE:  Respite    REASON FOR RESPITE:  Caregiver breakdown    O2 SAFETY:  On Room Air    FALL INTERVENTIONS PROVIDED:   Implemented/recommended resources for alarm system (personal alarm, bed alarm, call bell, etc.)  and Implemented/recommended increased supervision/assistance    INTERDISPLINARY COMMUNICATION/COLLABORATION:  Physician, MSW, [place] and RN, CNA    NEW MEDICATION INITIATION DOCUMENTATION:  N/A    Reason medication is being initiated:  N/A    MD / Provider name consulted re: change in status / initiation of new medication:  N/A    New Symptom(s):  N/A    New Order(s):  N/A    Name of the person notified of the changes:  Aunt available via phone if needed    Name of person being taught:  Client    Instructions given:  Reinforced safety and calling for assistance    Side Effects taught:  N/A    Response to teaching:  Understanding but will need reinforcement- high fall risk- multiple falls in home      COMFORTABLE DYING MEASURE:  Is Patient/family satisfied with symptom level?  yes    DISCHARGE PLAN:  LTC vs home with HCG  1941  PA completed. Client had requested to have prn morphine when avail per day shift and given as per MARs. Reports feeling 50/50. Some verbal responses appropriate and some obviously not with her expressive aphasia  Denied pain but rated at 7 on 1-10 scale. Depends dry at present. Provided with water. Will continue to monitor  2125  Scheduled medication provided. Client with incontinence very lrg amount of urine. Elen care provided. Will continue to monitor  2229  Client takes 2 50 mg lyrica tablet for total of 100 mg 2 times daily. TORB with Dr Mamta Rivera and modified order. Client given 100 mg at 9p as noted in comment section of MARs. 2326  Moving about somewhat restlessly in bed. Reports she does want ativan when asked. Advises she just urinated. Elen care provided.   Client again  With lrg urine output but she is also noted to be taking in a lot of water. Will continue to monitor  0003  Sitting up in bed with eyes closed and unlabored respirations. Will continue to monitor  0145  Client moving about in bed and set up bed alarm. Confused as to where she is but oriented. C/o pain at level 8 to L hip. PRN dose of morphine as per MARs. Pulled lorazepam but it is too early to give so will waste. Incontinent of lrg amount of urine and jasmin care provided. Provided with more Ice water. Will continue to monitor  0305  Awake with cell phone use. Denies needs  0345 Bed alarm went off as client moved about. Reports she needs to pee  Denies other c/os of needs. Jasmin care provided. Will continue to monitor  0600  Scheduled medication as per MARs- prilosec. Other 6 am medications actually taken at 9 am on routine basis and they are packaged with other AM medications- communicated with pharmacy via message to change times to 9A. Client with dry depends at this time. Provided with fresh ice water and phone.   Will continue to monitor  0700  Report to oncoming shift

## 2022-02-14 NOTE — HOSPICE
Patient found in bed A&Ox4 but drowsy. She reports she was attempting to move from bed to nearby bedside commode and fell to the floor. She denies any dizziness, syncope or seizure activity. Family responded to noise, helped her back to bed, and called triage. 2cm wide x 1cm tall hematoma observed on forehead above left eye. A 12cm wide x 20cm long contusion with significant swelling observed on left hip. Patient reports pain only during movement. As her left side is affected by glioblastoma, there is little sensation or movement. Reported all to Dr. Flash Michele, who ordered cold compress and acetaminophen. Communicated orders to patient's aunt, Abdelrahman Mak, who verbalized understanding. Reviewed safely transitioning with assistance from bed to bedside commode with patient and aunt - all verbalized understanding. Courtney Larson to expect mobile x-ray service this evening, and to call Triage with any changes, needs or concerns.       Fall documented in [employer]

## 2022-02-14 NOTE — HOSPICE
0700-Received report from [facility] and assumed care of pt. Pt is Respite LOC with primary diagnosis of Glioblastoma. 0800-Pt sitting up in bed with breakfast tray in front of her. CNA helped open pt items and assisted with set up. Pt difficult to understand, difficulty forming words. RN assessment completed. Pt stated she had no pain at this time. Will continue to monitor. 0830-Pt given PO medications. Pt swallowed all medications with water without difficulty. 0945- Pt resting with eyes closed. No s/s of pain or discomfort at this time. 1100-Pt talking on her cell phone. No distress noted. 1200-Pt positioned for lunch in bed. Lunch tray set up by SHIREEN. Pt able to feed self with use of right hand. 1300-Pt brief changed, repositioned in bed. Use of pillows for comfort. Pt complaining of pain in right leg/hip/back area. Morphine PO given for complaints of pain. Will continue to monitor and assess. 1430-Pt awoken from nap and was very confused, seeing people in her room, hallucinations. Pt reassured and spent some time with pt until she felt better. 1600-Pt awake and calm. Met with Matt Ward MSW for brief time. 1700-Pt positioned for dinner. Dinner set up and opened by SHIREEN. 1800-Pt ate 50-75% of dinner. Pt stated she had some discomfort but refused any medication at this time. Pt coloring on her phone toshia.  1900-Report given to [facility].     NAME OF PATIENT:  Tejal Chávez    LEVEL OF CARE:  Respite    REASON FOR GIP:   NA    *PATIENT REMAINS ELIGIBLE FOR [place] LEVEL OF CARE AS EVIDENCED BY: NA    REASON FOR RESPITE:  caregiver exhaustion    O2 SAFETY:  NA    FALL INTERVENTIONS PROVIDED:   Implemented/recommended resources for alarm system (personal alarm, bed alarm, call bell, etc.) , Implemented/recommended environmental changes (remove hazards, lower bed, improve lighting, etc.) and Implemented/recommended increased supervision/assistance    INTERDISPLINARY COMMUNICATION/COLLABORATION:  Physician, MSW and RN, CNA    NEW MEDICATION INITIATION DOCUMENTATION:  NA    Reason medication is being initiated:  NA    MD / Provider name consulted re: change in status / initiation of new medication:  NA    New Symptom(s):  NA    New Order(s):  NA    Name of the person notified of the changes:  NA    Name of person being taught:  NA    Instructions given:  NA    Side Effects taught:  NA    Response to teaching:  NA      COMFORTABLE DYING MEASURE:  Is Patient/family satisfied with symptom level?  yes    DISCHARGE PLAN:  Pt will continue her respite stay at [facility] and will be discharged back home or to a LTC facility and will be followed by home hospice team.

## 2022-02-14 NOTE — HOSPICE
PRN SN Hospice visit done for Covid 19 test due to [facility] placement occuring this date. Test done and results negative. Pt. denied SOB and pain at this time. Has large areas of bruising to almost entire left side from recent fall. Received pain medication just before my visit. Verbal though drowsy. No distress noted. Pt. and family encouraged to call Hospice with any questions or concerns.  Transport to  pt. between 2-230pm

## 2022-02-14 NOTE — HOSPICE
Problem is spiritual distress related to physical and cognitive changes. Goal is to hear and acknowledge Ms. Conterras's distress. The hospice chaplain completed a follow up visit with Ms. Brion Severin. Ms. Brion Severin recognized the hospice chaplain from last week's visit. Ms. Brion Severin shared that she has been thinking about her care situation. She acknowledged that it was difficult to choose between leaving the familiarity of home for a new place of care. Ms. Brion Severin appeared to value quietness. The hospice chaplain acknowledged that it was an important decision and reminded her that the decision didn't need to be made today. The hospice chaplain encouraged Ms. Contreras to take each day at a time as a way of reducing anxiety. Ms. Brion Severin smiled and agreed. Ms. Brion Severin responded positively to the hospice chaplain's offer to pray. The hospice chaplain offered a prayer of hope for the present and future by trusting in God. Ms. Brion Severin was very appreciative of the visit. The hospice chaplain will continue to visit Ms. Contreras once a week.

## 2022-02-14 NOTE — H&P
[facility]  [phone]    Patient Name: Kirk Mccormack  YOB: 1980    Date of Provider Hospice Visit: 02/13/22    Level of Care:   [] General Inpatient (GIP)    [] Routine   [x] Respite    Current Location of Care:  [] [facility] [place] [] [facility] [] [facility] [] [facility] [x[facility]      Principle Hospice Diagnosis:    Glioblastoma multiforme ([address] (C71.9)      Other Hospice diagnoses:     Seizures ([address] (R56.9)     Cerebrovascular accident (CVA), unspecified mechanism ([address] (I63.9)     Multiple falls (R29.6)     Non-insulin dependent type 2 diabetes mellitus ([address] (E11.9)     Transaminitis (R74.01)     Steatosis of liver (K76.0)     Encounter for hospice care (Z51.5)         HOSPICE SUMMARY     Chart Reviewed for patient's medical history and hospice care plan. Hospice Physician Certification/Recertification Narrative per :      Patient recertified for hospice care secondary to glioblastoma. Patient continues to show evidence of decline with increased fatigue, increased sleepiness, sleeping much more during the day. Remains on multiple seizure medications. Showing evidence of decreased appetite. Given the advanced nature of her cancer, patient has been recertified for ongoing hospice care         Patient being admitted for Respite care x 5 days for   [x]  Caregiver exhaustion and needing break  []  Caregiver unavailable     Patient is declining slowly but steadily at home. Apparently she had a fall at home on Friday 2/11. Her sister noted pt had developed a swelling on left hip and since then has been having some pain in that hip/area especially with movements. Pt was seen at home by visit nurse and ice application was done to the area. Pt felt left sided numbness. Mobile x ray of the hip was ordered but so far has not been completed. Pt today came in for respite stay to provide break to her family. PLAN   1.  Patient's home medications were reviewed and reconciled. Continue with current home medications and plan of care as outlined in chart. 2. Mobile X ray of the hip to be completed while pt at [facility]; r/o fracture etc; unlikely  3. Continue pain management for now  4. Disposition may need to be discussed this respite stay. Pt is declining regularly, increased falls, weakness, forgetfulness, poor insight and confusion. Pt's primary caregiver is grandmother who is older and Aunt also helps but works during the day. Pt needs more assistance and monitoring for safety at home; considering these circumstances SW intervention needed to discuss further care giving resources versus placement in facility. 5.  and SW to support family needs.

## 2022-02-14 NOTE — PROGRESS NOTES
Problem: Pressure Injury - Risk of  Goal: *Prevention of pressure injury  Description: Document Anderson Scale and appropriate interventions in the flowsheet. Outcome: Progressing Towards Goal  Note: Pressure Injury Interventions:  Sensory Interventions: Assess changes in LOC    Moisture Interventions: Absorbent underpads    Activity Interventions: Pressure redistribution bed/mattress(bed type)    Mobility Interventions: Float heels,Pressure redistribution bed/mattress (bed type)    Nutrition Interventions: Document food/fluid/supplement intake    Friction and Shear Interventions: Apply protective barrier, creams and emollients                Problem: Risk for Falls  Goal: Free of falls during inpatient stay  Description: Patient will be free of falls during inpatient stay. Outcome: Progressing Towards Goal     Problem: Pain  Goal: Assess satisfaction of level of comfort and symptom control  Outcome: Progressing Towards Goal  Goal: *Control of acute pain  Outcome: Progressing Towards Goal     Problem: General Wound Care  Goal: *Non-infected wound: Improvement of existing wound, absence of infection, and maintenance of skin integrity  Outcome: Progressing Towards Goal  Goal: *Infected Wound: Prevention of further infection  Description: Infection control procedures (eg: clean dressings, clean gloves, hand washing, precautions to isolate wound from contamination, sterile instruments used for wound debridement) should be implemented.   Outcome: Progressing Towards Goal  Goal: Interventions  Outcome: Progressing Towards Goal     Problem: Seizures  Goal: *STG: Remains free of seizure activity  Outcome: Progressing Towards Goal  Goal: *STG: Remains free of injury during seizure activity  Outcome: Progressing Towards Goal  Goal: Interventions  Outcome: Progressing Towards Goal

## 2022-02-14 NOTE — HOSPICE
Problem is spiritual distress related to physical and cognitive changes. Goal is to hear and acknowledge Ms. Contreras's distress. The hospice chaplain completed a follow up visit with Ms. Donita Brooks. Ms. Donita Brooks recognized the hospice chaplain from last week's visit. Ms. Donita Brooks shared that she has been thinking about her care situation. She acknowledged that it was difficult to choose between leaving the familiarity of home for a new place of care. Ms. Donita Brooks appeared to value quietness. The hospice chaplain acknowledged that it was an important decision and reminded her that the decision didn't need to be made today. The hospice chaplain encouraged Ms. Contreras to take each day at a time as a way of reducing anxiety. Ms. Donita Brooks smiled and agreed. Ms. Donita Brooks responded positively to the hospice chaplain's offer to pray. The hospice chaplain offered a prayer of hope for the present and future by trusting in God. Ms. Donita Brooks was very appreciative of the visit. The hospice chaplain will continue to visit Ms. Contreras once a week.

## 2022-02-15 NOTE — PROGRESS NOTES
Problem: Pressure Injury - Risk of  Goal: *Prevention of pressure injury  Description: Document Anderson Scale and appropriate interventions in the flowsheet. Outcome: Progressing Towards Goal  Note: Pressure Injury Interventions:  Sensory Interventions: Assess changes in LOC    Moisture Interventions: Apply protective barrier, creams and emollients    Activity Interventions: Pressure redistribution bed/mattress(bed type)    Mobility Interventions: Pressure redistribution bed/mattress (bed type)    Nutrition Interventions: Document food/fluid/supplement intake    Friction and Shear Interventions: Apply protective barrier, creams and emollients                Problem: Patient Education: Go to Patient Education Activity  Goal: Patient/Family Education  Outcome: Progressing Towards Goal     Problem: Risk for Falls  Goal: Free of falls during inpatient stay  Description: Patient will be free of falls during inpatient stay. Outcome: Progressing Towards Goal     Problem: Pain  Goal: Assess satisfaction of level of comfort and symptom control  Outcome: Progressing Towards Goal  Goal: *Control of acute pain  Outcome: Progressing Towards Goal     Problem: Communication Deficit  Goal: Effectively communicate symptoms, needs, and concerns  Description: Patient/family/caregiver will effectively communicate symptoms, needs and concerns. Outcome: Progressing Towards Goal     Problem: General Wound Care  Goal: *Non-infected wound: Improvement of existing wound, absence of infection, and maintenance of skin integrity  Outcome: Progressing Towards Goal  Goal: *Infected Wound: Prevention of further infection  Description: Infection control procedures (eg: clean dressings, clean gloves, hand washing, precautions to isolate wound from contamination, sterile instruments used for wound debridement) should be implemented.   Outcome: Progressing Towards Goal  Goal: Interventions  Outcome: Progressing Towards Goal     Problem: Seizures  Goal: *STG: Remains free of seizure activity  Outcome: Progressing Towards Goal  Goal: *STG: Remains free of injury during seizure activity  Outcome: Progressing Towards Goal  Goal: Interventions  Outcome: Progressing Towards Goal     Problem: Falls - Risk of  Goal: *Absence of Falls  Description: Document Fernandez Fall Risk and appropriate interventions in the flowsheet.   Outcome: Progressing Towards Goal  Note: Fall Risk Interventions:       Mentation Interventions: Adequate sleep, hydration, pain control    Medication Interventions: Bed/chair exit alarm    Elimination Interventions: Bed/chair exit alarm    History of Falls Interventions: Bed/chair exit alarm         Problem: Patient Education: Go to Patient Education Activity  Goal: Patient/Family Education  Outcome: Progressing Towards Goal

## 2022-02-15 NOTE — PROGRESS NOTES
Problem: Pressure Injury - Risk of  Goal: *Prevention of pressure injury  Description: Document Anderson Scale and appropriate interventions in the flowsheet.   Outcome: Progressing Towards Goal  Note: Pressure Injury Interventions:  Sensory Interventions: Assess changes in LOC    Moisture Interventions: Absorbent underpads,Apply protective barrier, creams and emollients    Activity Interventions: Pressure redistribution bed/mattress(bed type)    Mobility Interventions: Pressure redistribution bed/mattress (bed type)    Nutrition Interventions: Document food/fluid/supplement intake,Offer support with meals,snacks and hydration    Friction and Shear Interventions: Apply protective barrier, creams and emollients,HOB 30 degrees or less,Lift sheet,Minimize layers                Problem: Pain  Goal: Assess satisfaction of level of comfort and symptom control  Outcome: Progressing Towards Goal     Problem: Seizures  Goal: *STG: Remains free of seizure activity  Outcome: Progressing Towards Goal

## 2022-02-15 NOTE — HOSPICE
1900 Received report from [facility] Patient is sitting up in bed with a relaxed face. Changed dressing on left hip and changed patient. Please see flow sheet for assessment. 2100 Gave scheduled dose of lyrica 100 mg, prn dose of Ativan 1 mg for anxiety, and 10 mg of morphine for pain. 2200 Patient is sleeping with a relaxed face. 0000 patient is sleeping with relaxed face. 0200 Patient is sleeping on left side. 0400 Patient is sleeping in supine position. 3742 Patient woke up changed her brief and bandage on left hip.  0600 Patient resting quietly in bed.  0700 Report given. NAME OF PATIENT:  Jesus Manuel Pacheco    LEVEL OF CARE:  Respite    REASON FOR GIP:   NA    *PATIENT REMAINS ELIGIBLE FOR GIP LEVEL OF CARE AS EVIDENCED BY: (MUST BE ADDRESSED OF PATIENT GIP)  NA    REASON FOR RESPITE:  Caregiver breakdown    O2 SAFETY:  NA    FALL INTERVENTIONS PROVIDED:   Implemented/recommended use of non-skid footwear, Implemented/recommended use of fall risk identification flag to all team members, Implemented/recommended assistive devices and encouraged their use, Implemented/recommended resources for alarm system (personal alarm, bed alarm, call bell, etc.) , Implemented/recommended environmental changes (remove hazards, lower bed, improve lighting, etc.) and Implemented/recommended increased supervision/assistance    INTERDISPLINARY COMMUNICATION/COLLABORATION:  Physician and RN, CNA    NEW MEDICATION INITIATION DOCUMENTATION:  NA    Reason medication is being initiated:  SILVESTRE    MD / Provider name consulted re: change in status / initiation of new medication:  NA    New Symptom(s):  NA    New Order(s):  NA    Name of the person notified of the changes:  NA    Name of person being taught:  NA    Instructions given:  NA    Side Effects taught:  NA    Response to teaching:  NA      COMFORTABLE DYING MEASURE:  Is Patient/family satisfied with symptom level?  yes    DISCHARGE PLAN:  Home.

## 2022-02-15 NOTE — PROGRESS NOTES
..  0700:Report received from [facility]. Pt is awake aao times 2-3 with confusion complain of mild pain to lower back, repositioned for comfort. 09:30:Pt complain of lower back pain, adm morphine and ativan SL, tolerated well. 10:40:Pt resting with eyes closed,   12:30:Adm PRN morphine and ativanzfor pain and anxiety  1300:Pt ate 75% of her meal, tolerated well  1400:Pt had bright red blood on a tissue,after  further assessing she had a nose bleed that stop bleeding, continue to monitor. 15:30:Pt resting with eyes closed comfort and safety maintained. 17:00:Adm schedule meds, meal setup pain assess, pt denies. 18:30:Pt voids, incontinent care done sheet and pads changed. 1900:Report given to [facility].                         NAME OF PATIENT:      LEVEL OF CARE: Respite  REASON FOR GIP: N/A     *PATIENT REMAINS ELIGIBLE FOR GIP LEVEL OF CARE AS EVIDENCED BY:n/a    REASON FOR RESPITE:  Caregiver Exhaustion     O2 SAFETY:  Concentrator positioning (6\" from furniture/drapes), Tanks stored in david , No petroleum based products on face while oxygen in use and Oxygen sign on the door     FALL INTERVENTIONS PROVIDED:   Implemented/recommended use of fall risk identification flag to all team members, Implemented/recommended assistive devices and encouraged their use, Implemented/recommended resources for alarm system (personal alarm, bed alarm, call bell, etc.)  and Implemented/recommended environmental changes (remove hazards, lower bed, improve lighting, etc.)     INTERDISPLINARY COMMUNICATION/COLLABORATION:  Physician, MSW, [place] and RN, CNA     NEW MEDICATION INITIATION DOCUMENTATION:N/A      Reason medication is being initiated:  N/A     MD / Provider name consulted re: change in status / initiation of new medication:  N/A     New Symptom(s):  N/A     New Order(s):  N/A     Name of the person notified of the changes:  N/A     Name of person being taught:  N/A     Instructions given: N/A     Side Effects taught:  N/A     Response to teaching:  N/A        COMFORTABLE DYING MEASURE:  Is Patient/family satisfied with symptom level?  yes     DISCHARGE PLAN:Pt will complete respite stya and go home with family.

## 2022-02-16 NOTE — HOSPICE
NAME OF PATIENT:  Greg Amador    LEVEL OF CARE:  Respite    REASON FOR GIP:   NA    *PATIENT REMAINS ELIGIBLE FOR GIP LEVEL OF CARE AS EVIDENCED BY: (MUST BE ADDRESSED OF PATIENT GIP)  NA    REASON FOR RESPITE:  Caregiver breakdown    O2 SAFETY:  NA    FALL INTERVENTIONS PROVIDED:   Implemented/recommended use of fall risk identification flag to all team members, Implemented/recommended assistive devices and encouraged their use, Implemented/recommended resources for alarm system (personal alarm, bed alarm, call bell, etc.) , Implemented/recommended environmental changes (remove hazards, lower bed, improve lighting, etc.) and Implemented/recommended increased supervision/assistance    INTERDISPLINARY COMMUNICATION/COLLABORATION:  Physician and RN, CNA    NEW MEDICATION INITIATION DOCUMENTATION:  NA    Reason medication is being initiated:  NA    MD / Provider name consulted re: change in status / initiation of new medication:  NA    New Symptom(s):  NA    New Order(s):  NA    Name of the person notified of the changes:  NA    Name of person being taught:  NA  Instructions given:  NA    Side Effects taught:  NA    Response to teaching:  NA      COMFORTABLE DYING MEASURE:  Is Patient/family satisfied with symptom level?  yes    DISCHARGE PLAN:  Home   1900 Received report from [facility]. 2000 Patient is sleeping in bed with relaxed face. Please see flow sheet for assessment. 2100 Gave scheduled dose of Lyrica 100 mg, prn dose of Ativan 2 mg for anxiety, and morphine 10 mg for pain of  7 on pain scale 0-10.   2300 Patient is watching TV in bed.  0100 Patient is sleeping with a relaxed face. 0300 Patient is up playing on her phone. 0400 Patient is complaining of left hip hurting and she is anxious. Gave prn dose of Ativan 1 mg and morphine  10 mg. Gave patient a bed bath and changed brief.  0500 Patient is resting in bed with a relaxed face. [id number] Patient is sleeping turned to left side. 0700 Report given.

## 2022-02-16 NOTE — PROGRESS NOTES
Problem: Pressure Injury - Risk of  Goal: *Prevention of pressure injury  Description: Document Anderson Scale and appropriate interventions in the flowsheet. Outcome: Progressing Towards Goal  Note: Pressure Injury Interventions:  Sensory Interventions: Assess changes in LOC,Check visual cues for pain,Float heels,Minimize linen layers,Keep linens dry and wrinkle-free    Moisture Interventions: Absorbent underpads,Maintain skin hydration (lotion/cream),Apply protective barrier, creams and emollients,Check for incontinence Q2 hours and as needed    Activity Interventions: Pressure redistribution bed/mattress(bed type)    Mobility Interventions: Pressure redistribution bed/mattress (bed type),Turn and reposition approx.  every two hours(pillow and wedges)    Nutrition Interventions: Offer support with meals,snacks and hydration    Friction and Shear Interventions: Apply protective barrier, creams and emollients,Lift sheet                Problem: Pain  Goal: *Control of acute pain  Outcome: Progressing Towards Goal

## 2022-02-16 NOTE — PROGRESS NOTES
Problem: Pressure Injury - Risk of  Goal: *Prevention of pressure injury  Description: Document Anderson Scale and appropriate interventions in the flowsheet. Outcome: Progressing Towards Goal  Note: Pressure Injury Interventions:  Sensory Interventions: Assess changes in LOC    Moisture Interventions: Absorbent underpads    Activity Interventions: Pressure redistribution bed/mattress(bed type)    Mobility Interventions: Pressure redistribution bed/mattress (bed type)    Nutrition Interventions: Offer support with meals,snacks and hydration    Friction and Shear Interventions: Apply protective barrier, creams and emollients                Problem: Patient Education: Go to Patient Education Activity  Goal: Patient/Family Education  Outcome: Progressing Towards Goal     Problem: Risk for Falls  Goal: Free of falls during inpatient stay  Description: Patient will be free of falls during inpatient stay. Outcome: Progressing Towards Goal     Problem: Pain  Goal: Assess satisfaction of level of comfort and symptom control  Outcome: Progressing Towards Goal  Goal: *Control of acute pain  Outcome: Progressing Towards Goal     Problem: Communication Deficit  Goal: Effectively communicate symptoms, needs, and concerns  Description: Patient/family/caregiver will effectively communicate symptoms, needs and concerns. Outcome: Progressing Towards Goal     Problem: General Wound Care  Goal: *Non-infected wound: Improvement of existing wound, absence of infection, and maintenance of skin integrity  Outcome: Progressing Towards Goal  Goal: *Infected Wound: Prevention of further infection  Description: Infection control procedures (eg: clean dressings, clean gloves, hand washing, precautions to isolate wound from contamination, sterile instruments used for wound debridement) should be implemented.   Outcome: Progressing Towards Goal  Goal: Interventions  Outcome: Progressing Towards Goal     Problem: Seizures  Goal: *STG: Remains free of seizure activity  Outcome: Progressing Towards Goal  Goal: *STG: Remains free of injury during seizure activity  Outcome: Progressing Towards Goal  Goal: Interventions  Outcome: Progressing Towards Goal     Problem: Falls - Risk of  Goal: *Absence of Falls  Description: Document Fernandez Fall Risk and appropriate interventions in the flowsheet.   Outcome: Progressing Towards Goal  Note: Fall Risk Interventions:       Mentation Interventions: Adequate sleep, hydration, pain control    Medication Interventions: Bed/chair exit alarm    Elimination Interventions: Bed/chair exit alarm    History of Falls Interventions: Bed/chair exit alarm         Problem: Patient Education: Go to Patient Education Activity  Goal: Patient/Family Education  Outcome: Progressing Towards Goal

## 2022-02-16 NOTE — PROGRESS NOTES
0700  Report received from [address]  Patient resting quietly in bed with eyes closed. Appears to be sleeping comfortably. 0815  Scheduled AM medications administered. Patient sitting up in bed eating breakfast.   0900  Patient rates pain as 2/10, aching, and generalized. She declines pain medication at this time, stating that she usually feels achy in the mornings. 1030  Patient resting quietly in bed with eyes closed. Appears to be sleeping. No evidence of discomfort noted. 1100  Wet brief and left upper leg wound dressing changed. Assessment complete, see flowsheets. 1245  Scheduled Decadron administered. Patient sitting up in bed eating lunch. 1445  Patient sitting up in bed visiting with her grandmother. Patient is cheerful, requests french fries, ordered from kitchen. 1645  Patient's wet brief and left upper leg dressing changed. 1800  Patient's scheduled medications administered. Patient states that she feels tired. Repositioned patient and encouraged her to rest.    1900  Report given to oncoming nurse.     NAME OF PATIENT:  Ryland Tristan    LEVEL OF CARE:  Respite    REASON FOR GIP:  NA    *PATIENT REMAINS ELIGIBLE FOR GIP LEVEL OF CARE AS EVIDENCED BY: (MUST BE ADDRESSED OF PATIENT GIP)  NA    REASON FOR RESPITE:  Caregiver Exhaustioin    O2 SAFETY:  NA    FALL INTERVENTIONS PROVIDED:   Implemented/recommended use of non-skid footwear, Implemented/recommended use of fall risk identification flag to all team members, Implemented/recommended assistive devices and encouraged their use, Implemented/recommended resources for alarm system (personal alarm, bed alarm, call bell, etc.) , Implemented/recommended environmental changes (remove hazards, lower bed, improve lighting, etc.) and Implemented/recommended increased supervision/assistance    INTERDISPLINARY COMMUNICATION/COLLABORATION:  Physician, MSW, [place] and RN, CNA    NEW MEDICATION INITIATION DOCUMENTATION: NA    Reason medication is being initiated:  NA    MD / Provider name consulted re: change in status / initiation of new medication:  NA    New Symptom(s):  NA    New Order(s):  NA    Name of the person notified of the changes:  NA    Name of person being taught:  NA    Instructions given:  NA    Side Effects taught:  NA    Response to teaching:  NA    COMFORTABLE DYING MEASURE:  Is Patient/family satisfied with symptom level?   Yes    DISCHARGE PLAN:  Return home on hospice Fri, 2/18

## 2022-02-17 NOTE — PROGRESS NOTES
Problem: Pressure Injury - Risk of  Goal: *Prevention of pressure injury  Description: Document Anderson Scale and appropriate interventions in the flowsheet. Outcome: Progressing Towards Goal  Note: Pressure Injury Interventions:  Sensory Interventions: Assess changes in LOC,Check visual cues for pain    Moisture Interventions: Absorbent underpads,Check for incontinence Q2 hours and as needed    Activity Interventions: Pressure redistribution bed/mattress(bed type)    Mobility Interventions: Pressure redistribution bed/mattress (bed type)    Nutrition Interventions: Offer support with meals,snacks and hydration    Friction and Shear Interventions: Apply protective barrier, creams and emollients                Problem: Risk for Falls  Goal: Free of falls during inpatient stay  Description: Patient will be free of falls during inpatient stay.   Outcome: Progressing Towards Goal

## 2022-02-17 NOTE — PROGRESS NOTES
NAME OF PATIENT:  Ashwini Tyler    LEVEL OF CARE:  Respite    REASON FOR GIP: NA    *PATIENT REMAINS ELIGIBLE FOR GIP LEVEL OF CARE AS EVIDENCED BY: (MUST BE ADDRESSED OF PATIENT GIP)  NA    REASON FOR RESPITE:  Caregiver Exhaustion    O2 SAFETY:  NA    FALL INTERVENTIONS PROVIDED:   Implemented/recommended use of non-skid footwear, Implemented/recommended use of fall risk identification flag to all team members, Implemented/recommended assistive devices and encouraged their use, Implemented/recommended resources for alarm system (personal alarm, bed alarm, call bell, etc.) , Implemented/recommended environmental changes (remove hazards, lower bed, improve lighting, etc.) and Implemented/recommended increased supervision/assistance    INTERDISPLINARY COMMUNICATION/COLLABORATION:  Physician, MSW, [place] and RN, CNA    NEW MEDICATION INITIATION DOCUMENTATION:  NA    Reason medication is being initiated:  NA    MD / Provider name consulted re: change in status / initiation of new medication:  NA    New Symptom(s):  NA    New Order(s):  NA    Name of the person notified of the changes:  NA    Name of person being taught:  NA    Instructions given:  NA    Side Effects taught:  NA    Response to teaching:  NA    COMFORTABLE DYING MEASURE:  Is Patient/family satisfied with symptom level? Yes    DISCHARGE PLAN:  Home hospice    0700  Report received from [place], [address]  Patient resting quietly in bed with eyes closed. Appears to be sleeping comfortably. 0830  Patient sitting up in bed eating breakfast. Scheduled medications administered. Increased confusion and communication difficulty noted. 801 [address] arrived to visit with patient. [address] with Dr. Tiana Clement. No new orders. 1145  Patient sitting up in bed eating lunch. 1245  Scheduled Dexamethasone administered. 1415  Patient sitting up in bed playing on phone. No needs at this time.   215 [facility]  Patient's grandmother arrived to visit.  1600  Patient complaining of generalized pain, particularly in left hip area, 7/10. PRN Morphine administered. 1700  Responded to bed alarm. Patient found trying to climb out of bed. Patient states she wants to go home. Patient called her Aunt who spoke with patient and nurse. Patient states that she is unable to have a bowel movement. [address], LCSW arrived to meet with patient. 1815  Scheduled medications and Dulcolax suppository administered. Patient's Aunt arrived with dinner for patient. Davin Lawrence and Aunt at bedside discussing discharge plans for tomorrow with patient.

## 2022-02-17 NOTE — HOSPICE
1900 Received report. 1920 Patient is sitting up in bed with respirations of 16, and relaxed face. Please see flow sheet for assessment. 2140 Gave Patient prn Ativan 2 mg, and Morphine 10 mg for pain and anxiety. Gave scheduled dose Lyrica 100 mg. Changed patient's brief. Discussed with patient administering Dulcolax suppository because last bowel movement was 02/12/2022, patient stated that she does not feel constipated. Patient stated that she does not have bowel movements daily, but has had problems with constipation in past. Patient did agree taking Senna-docusate tablet in morning. 2300 Patient is sleeping with relaxed face. 0100 Patient is sleeping on left side. 0300 Patient is sleeping in supine position with relaxed face. 0430 Patient is awake having pain in left hip, gave prn dose of morphine 10 mg. Bathed and changed patient. 0630 Patient is resting in bed with relaxed face. 0700 Report given.       NAME OF PATIENT:  Angelica Stephens    LEVEL OF CARE:  Respite    REASON FOR GIP:   NA    *PATIENT REMAINS ELIGIBLE FOR GIP LEVEL OF CARE AS EVIDENCED BY: (MUST BE ADDRESSED OF PATIENT GIP)  NA    REASON FOR RESPITE:  Caregiver breakdown    O2 SAFETY:  NA    FALL INTERVENTIONS PROVIDED:   Implemented/recommended use of fall risk identification flag to all team members, Implemented/recommended assistive devices and encouraged their use, Implemented/recommended resources for alarm system (personal alarm, bed alarm, call bell, etc.) , Implemented/recommended environmental changes (remove hazards, lower bed, improve lighting, etc.) and Implemented/recommended increased supervision/assistance    INTERDISPLINARY COMMUNICATION/COLLABORATION:  Physician and RN, CNA    NEW MEDICATION INITIATION DOCUMENTATION:  NA    Reason medication is being initiated:  NA    MD / Provider name consulted re: change in status / initiation of new medication:  NA      New Symptom(s):  NA    New Order(s):  NA    Name of the person notified of the changes:  NA    Name of person being taught:  NA    Instructions given:  NA    Side Effects taught:  NA    Response to teaching:  NA      COMFORTABLE DYING MEASURE:  Is Patient/family satisfied with symptom level?  yes    DISCHARGE PLAN:  Home after respite stay.

## 2022-02-17 NOTE — HOSPICE
Problem is spiritual distress related to her decrease in cognition. Goal is listen and acknowledge Ms. Contreras's distress. The assigned home hospice chaplain completed a follow up visit with Ms. Greyson Clemente today. Ms. Greyson Clemente was alert and oriented. Ms. Greyson Clemente continues to experience problems in communicating which leads to her feeling frustrated. Ms. Greyson Clemente is also having difficulty seeing which has greatly increased her anxiety. The hospice chaplain created a sacred and safe place for Ms. Contreras to explore her concerns and feelings about these changes. Ms. Greyson Clemente acknowledged feelings of isolation and despair. The hospice chaplain affirmed Ms. Greyson Clemente for her continued belief in God. Ms. Greyson Clemente welcomed prayer as a way of discerning God's constant presence. Ms. Cameron Heath affect became more positive as evident in her smile. The plan is to follow up with a 's visit in the next two weeks.

## 2022-02-17 NOTE — PROGRESS NOTES
[facility] to Those in Need  [phone]    Patient Name: Ryland Tristan  YOB: 1980    Date of Provider Hospice Visit: 02/17/22    Level of Care:   [] General Inpatient (GIP)    [] Routine   [x] Respite    Current Location of Care:  [] [address] [place] [address] [] [facility] [] [facility] [] [facility] [x] Hospice [facility]      Principle Hospice Diagnosis:    Glioblastoma multiforme ([address] (C71.9)      Other Hospice diagnoses:     Seizures ([address] (R56.9)     Cerebrovascular accident (CVA), unspecified mechanism ([address] (I63.9)     Multiple falls (R29.6)     Non-insulin dependent type 2 diabetes mellitus ([address] (E11.9)     Transaminitis (R74.01)     Steatosis of liver (K76.0)     Encounter for hospice care (Z51.5)         HOSPICE SUMMARY     Chart Reviewed for patient's medical history and hospice care plan. Hospice Physician Certification/Recertification Narrative per :      Patient recertified for hospice care secondary to glioblastoma. Patient continues to show evidence of decline with increased fatigue, increased sleepiness, sleeping much more during the day. Remains on multiple seizure medications. Showing evidence of decreased appetite. Given the advanced nature of her cancer, patient has been recertified for ongoing hospice care    2/17-checked on patient. She is struggling with word finding difficulty this morning. Bruising remains-especially on the left side. Did check the area of the left hip which does have a hematoma with superficial abrasion. Suppose to return home tomorrow. She does enjoy the quiet nature of the [facility]         Patient being admitted for Respite care x 5 days for   [x]  Caregiver exhaustion and needing break  []  Caregiver unavailable     Patient is declining slowly but steadily at home. Apparently she had a fall at home on Friday 2/11. Her sister noted pt had developed a swelling on left hip and since then has been having some pain in that hip/area especially with movements. Pt was seen at home by visit nurse and ice application was done to the area. Pt felt left sided numbness. Mobile x ray of the hip was ordered but so far has not been completed-no fracture   Pt today came in for respite stay to provide break to her family. PLAN   1. Patient's home medications were reviewed and reconciled. Continue with current home medications and plan of care as outlined in chart. 2. Continue pain management for now  3. Anticipate discharge tomorrow but will reassess in the AM  4.  and SW to support family needs.

## 2022-02-18 NOTE — PROGRESS NOTES
Problem: Pressure Injury - Risk of  Goal: *Prevention of pressure injury  Description: Document Anderson Scale and appropriate interventions in the flowsheet. Outcome: Progressing Towards Goal  Note: Pressure Injury Interventions:  Sensory Interventions: Assess changes in LOC,Check visual cues for pain    Moisture Interventions: Absorbent underpads,Check for incontinence Q2 hours and as needed    Activity Interventions: Pressure redistribution bed/mattress(bed type)    Mobility Interventions: Pressure redistribution bed/mattress (bed type)    Nutrition Interventions: Offer support with meals,snacks and hydration    Friction and Shear Interventions: Apply protective barrier, creams and emollients                Problem: Pain  Goal: Assess satisfaction of level of comfort and symptom control  Outcome: Progressing Towards Goal  Goal: *Control of acute pain  Outcome: Progressing Towards Goal     Problem: Risk for Falls  Goal: Free of falls during inpatient stay  Description: Patient will be free of falls during inpatient stay. Outcome: Progressing Towards Goal     Problem: Falls - Risk of  Goal: *Absence of Falls  Description: Document Cuello Blades Fall Risk and appropriate interventions in the flowsheet.   Outcome: Progressing Towards Goal  Note: Fall Risk Interventions:       Mentation Interventions: Adequate sleep, hydration, pain control    Medication Interventions: Bed/chair exit alarm    Elimination Interventions: Call light in reach    History of Falls Interventions: Bed/chair exit alarm

## 2022-02-18 NOTE — HOSPICE
1900:  Report received from Sam Goltz, [facility]  2008:  Assessed pt,  Inc of a very large amount of soft formed stool. C/O pain in left hip when turned. PRN dose of Morphine 10 mg given SL. Cleansed and barrier cream applied. 2100:  Stated pain is relieved. 2300:  Sitting up in bed. Playing game on phone. 0015: Inc of a large amount of urine and a moderate amount of mushy brown stool. Cleansed and barrier cream applied. Asked for cheeseburger to be reheated. 0200:  Resting quietly with eyes closed. Resp even and unlabored. 0400:  Continues to rest quietly with eyes closed. 1709:  Awake. C/O H/A   PRN dose of Morphine 10 mg given SL. Inc of a large amount of urine. Cleansed and barrier cream applied. Asked pt if she wanted full bath at this time. Stated, \" Ledezma Tita I am too tired. \"  Continues to have expressive aphasia and she gets frustrated trying to get words out.    2689:  Resting quietly with eyes closed. Meds effective. 9532:  Report given to oncoming nurse.   NAME OF PATIENT:  Michelle Maria    LEVEL OF CARE:  Respite    REASON FOR GIP:   N/A    *PATIENT REMAINS ELIGIBLE FOR GIP LEVEL OF CARE AS EVIDENCED BY: (MUST BE ADDRESSED OF PATIENT GIP)      REASON FOR RESPITE:  Caregiver cannot care for patient due to:  exhaustion    O2 SAFETY:  N/A    FALL INTERVENTIONS PROVIDED:   Implemented/recommended resources for alarm system (personal alarm, bed alarm, call bell, etc.)     INTERDISPLINARY COMMUNICATION/COLLABORATION:  Physician, MSW, [place] and RN, CNA    NEW MEDICATION INITIATION DOCUMENTATION:  N/A    Reason medication is being initiated:  N/A    MD / Provider name consulted re: change in status / initiation of new medication:  N/A    New Symptom(s):  N/A    New Order(s): N/A    Name of the person notified of the changes:  N/A    Name of person being taught:  N/A    Instructions given:  N/A    Side Effects taught:  N/A    Response to teaching:  N/A      COMFORTABLE DYING MEASURE:  Is Patient/family satisfied with symptom level?  yes    DISCHARGE PLAN:  Home in AM

## 2022-02-18 NOTE — PROGRESS NOTES
Problem: Pressure Injury - Risk of  Goal: *Prevention of pressure injury  Description: Document Anderson Scale and appropriate interventions in the flowsheet. 2/18/2022 1210 by Faisal Yousif RN  Outcome: Resolved/Met  2/18/2022 1210 by Faisal Yousif RN  Outcome: Progressing Towards Goal  Note: Pressure Injury Interventions:  Sensory Interventions: Assess changes in LOC,Check visual cues for pain,Float heels,Keep linens dry and wrinkle-free,Maintain/enhance activity level,Minimize linen layers,Pad between skin to skin,Pressure redistribution bed/mattress (bed type),Turn and reposition approx. every two hours (pillows and wedges if needed)    Moisture Interventions: Absorbent underpads,Apply protective barrier, creams and emollients,Check for incontinence Q2 hours and as needed,Maintain skin hydration (lotion/cream),Minimize layers,Moisture barrier    Activity Interventions: Pressure redistribution bed/mattress(bed type)    Mobility Interventions: Float heels,HOB 30 degrees or less,Pressure redistribution bed/mattress (bed type),Turn and reposition approx. every two hours(pillow and wedges)    Nutrition Interventions: Document food/fluid/supplement intake,Offer support with meals,snacks and hydration    Friction and Shear Interventions: Apply protective barrier, creams and emollients,Foam dressings/transparent film/skin sealants,HOB 30 degrees or less                Problem: Patient Education: Go to Patient Education Activity  Goal: Patient/Family Education  2/18/2022 1210 by Faisal Yousif RN  Outcome: Resolved/Met  2/18/2022 1210 by Faisal Yousif RN  Outcome: Progressing Towards Goal     Problem: Risk for Falls  Goal: Free of falls during inpatient stay  Description: Patient will be free of falls during inpatient stay.   2/18/2022 1210 by Faisal Yousif RN  Outcome: Resolved/Met  2/18/2022 1210 by Faisal Yousif RN  Outcome: Progressing Towards Goal     Problem: Pain  Goal: Assess satisfaction of level of comfort and symptom control  2/18/2022 1210 by Pia Pelayo RN  Outcome: Resolved/Met  2/18/2022 1210 by Pia Pelayo RN  Outcome: Progressing Towards Goal  Goal: *Control of acute pain  2/18/2022 1210 by Pia Pelayo RN  Outcome: Resolved/Met  2/18/2022 1210 by Pia Pelayo RN  Outcome: Progressing Towards Goal     Problem: Communication Deficit  Goal: Effectively communicate symptoms, needs, and concerns  Description: Patient/family/caregiver will effectively communicate symptoms, needs and concerns. 2/18/2022 1210 by Pia Pelayo RN  Outcome: Resolved/Met  2/18/2022 1210 by Pia Pelayo RN  Outcome: Progressing Towards Goal     Problem: General Wound Care  Goal: *Non-infected wound: Improvement of existing wound, absence of infection, and maintenance of skin integrity  2/18/2022 1210 by Pia Pelayo RN  Outcome: Resolved/Met  2/18/2022 1210 by Pia Pelayo RN  Outcome: Progressing Towards Goal  Goal: *Infected Wound: Prevention of further infection  Description: Infection control procedures (eg: clean dressings, clean gloves, hand washing, precautions to isolate wound from contamination, sterile instruments used for wound debridement) should be implemented.   2/18/2022 1210 by Pia Pelayo RN  Outcome: Resolved/Met  2/18/2022 1210 by Pia Pelayo RN  Outcome: Progressing Towards Goal  Goal: Interventions  2/18/2022 1210 by Pia Pelayo RN  Outcome: Resolved/Met  2/18/2022 1210 by Pia Pelayo RN  Outcome: Progressing Towards Goal     Problem: Seizures  Goal: *STG: Remains free of seizure activity  2/18/2022 1210 by Pia Pelayo RN  Outcome: Resolved/Met  2/18/2022 1210 by Pia Pelayo RN  Outcome: Progressing Towards Goal  Goal: *STG: Remains free of injury during seizure activity  2/18/2022 1210 by Pia Pelayo RN  Outcome: Resolved/Met  2/18/2022 1210 by Pia Pelayo RN  Outcome: Progressing Towards Goal  Goal: Interventions  2/18/2022 1210 by Pia Pelayo RN  Outcome: Resolved/Met  2/18/2022 1210 by Kaiden Harris RN  Outcome: Progressing Towards Goal     Problem: Falls - Risk of  Goal: *Absence of Falls  Description: Document Carmen Castañeda Fall Risk and appropriate interventions in the flowsheet.   2/18/2022 1210 by Kaiden Harris RN  Outcome: Resolved/Met  2/18/2022 1210 by Kaiden Harris RN  Outcome: Progressing Towards Goal  Note: Fall Risk Interventions:       Mentation Interventions: Bed/chair exit alarm    Medication Interventions: Bed/chair exit alarm    Elimination Interventions: Bed/chair exit alarm,Call light in reach    History of Falls Interventions: Bed/chair exit alarm         Problem: Patient Education: Go to Patient Education Activity  Goal: Patient/Family Education  2/18/2022 1210 by Kaiden Harris RN  Outcome: Resolved/Met  2/18/2022 1210 by Kaiden Harris RN  Outcome: Progressing Towards Goal

## 2022-02-18 NOTE — PROGRESS NOTES
0700: report received from [address]: pt awake, sitting up in bed. States \"everything is hurting. \" and would like some pain meds. Speech is clear with some expressive aphasia. 0740: morning meds given per MAR and PRN dose of tramadol. Full assessment done, see flowsheets. Breakfast tray set up for patient. Updated patient on plan for the day and discharge plan of 12pm.   0930: pt resting quietly with eyes closed, respirations even and unlabored. 1100: pt receiving full bed bath. Wound on L hip and BL shins cleansed and new dressings applied. 1145: transport here to  patient. Report called to home nurse Marco Antonio Doan and to aunt. PRN dose of morphine given prior to discharge. Pt left with belongings, meds, MAR report, and copy of DNR order.         NAME OF PATIENT:  [facility]    LEVEL OF CARE:  respite    REASON FOR GIP:   n/a    *PATIENT REMAINS ELIGIBLE FOR [facility] LEVEL OF CARE AS EVIDENCED BY: (MUST BE ADDRESSED OF PATIENT GIP)      REASON FOR RESPITE:  Caregiver cannot care for patient due to:  exhaustion    O2 SAFETY:  pt is on room air    FALL INTERVENTIONS PROVIDED:   Implemented/recommended use of non-skid footwear, Implemented/recommended use of fall risk identification flag to all team members, Implemented/recommended assistive devices and encouraged their use, Implemented/recommended resources for alarm system (personal alarm, bed alarm, call bell, etc.) , Implemented/recommended environmental changes (remove hazards, lower bed, improve lighting, etc.) and Implemented/recommended increased supervision/assistance    INTERDISPLINARY COMMUNICATION/COLLABORATION:  Physician, MSW, [employer] and RN, CNA    NEW MEDICATION INITIATION DOCUMENTATION:  n/a    Reason medication is being initiated:  n/a    MD / Provider name consulted re: change in status / initiation of new medication:  n/a    New Symptom(s):  n/a    New Order(s):  n/a    Name of the person notified of the changes:  n/a    Name of person being taught:  n/a    Instructions given:  n/a    Side Effects taught:  n/a    Response to teaching:  n/a      COMFORTABLE DYING MEASURE:  Is Patient/family satisfied with symptom level?  yes    DISCHARGE PLAN:  Return home today at 12pm and continue to be followed by home hospice.

## 2022-02-18 NOTE — PROGRESS NOTES
Problem: Pressure Injury - Risk of  Goal: *Prevention of pressure injury  Description: Document Anderson Scale and appropriate interventions in the flowsheet. Outcome: Progressing Towards Goal  Note: Pressure Injury Interventions:  Sensory Interventions: Assess changes in LOC,Check visual cues for pain,Float heels,Keep linens dry and wrinkle-free,Maintain/enhance activity level,Minimize linen layers,Pad between skin to skin,Pressure redistribution bed/mattress (bed type),Turn and reposition approx. every two hours (pillows and wedges if needed)    Moisture Interventions: Absorbent underpads,Apply protective barrier, creams and emollients,Check for incontinence Q2 hours and as needed,Maintain skin hydration (lotion/cream),Minimize layers,Moisture barrier    Activity Interventions: Pressure redistribution bed/mattress(bed type)    Mobility Interventions: Float heels,HOB 30 degrees or less,Pressure redistribution bed/mattress (bed type),Turn and reposition approx. every two hours(pillow and wedges)    Nutrition Interventions: Document food/fluid/supplement intake,Offer support with meals,snacks and hydration    Friction and Shear Interventions: Apply protective barrier, creams and emollients,Foam dressings/transparent film/skin sealants,HOB 30 degrees or less                Problem: Patient Education: Go to Patient Education Activity  Goal: Patient/Family Education  Outcome: Progressing Towards Goal     Problem: Risk for Falls  Goal: Free of falls during inpatient stay  Description: Patient will be free of falls during inpatient stay.   Outcome: Progressing Towards Goal     Problem: Pain  Goal: Assess satisfaction of level of comfort and symptom control  Outcome: Progressing Towards Goal  Goal: *Control of acute pain  Outcome: Progressing Towards Goal     Problem: Communication Deficit  Goal: Effectively communicate symptoms, needs, and concerns  Description: Patient/family/caregiver will effectively communicate symptoms, needs and concerns. Outcome: Progressing Towards Goal     Problem: General Wound Care  Goal: *Non-infected wound: Improvement of existing wound, absence of infection, and maintenance of skin integrity  Outcome: Progressing Towards Goal  Goal: *Infected Wound: Prevention of further infection  Description: Infection control procedures (eg: clean dressings, clean gloves, hand washing, precautions to isolate wound from contamination, sterile instruments used for wound debridement) should be implemented. Outcome: Progressing Towards Goal  Goal: Interventions  Outcome: Progressing Towards Goal     Problem: Seizures  Goal: *STG: Remains free of seizure activity  Outcome: Progressing Towards Goal  Goal: *STG: Remains free of injury during seizure activity  Outcome: Progressing Towards Goal  Goal: Interventions  Outcome: Progressing Towards Goal     Problem: Falls - Risk of  Goal: *Absence of Falls  Description: Document Fernandez Fall Risk and appropriate interventions in the flowsheet.   Outcome: Progressing Towards Goal  Note: Fall Risk Interventions:       Mentation Interventions: Bed/chair exit alarm    Medication Interventions: Bed/chair exit alarm    Elimination Interventions: Bed/chair exit alarm,Call light in reach    History of Falls Interventions: Bed/chair exit alarm         Problem: Patient Education: Go to Patient Education Activity  Goal: Patient/Family Education  Outcome: Progressing Towards Goal

## 2022-02-21 NOTE — HOSPICE
Pt received sleeping on bed but easily woken with verbal stimulation. Aunt and grandmother at the bedside. Aunt states that patient having bad pain and has been taking morphine because tramadol not helping. Dr. Author Shipley called and [employer] for oxycodone received and ordered for delivery today. Grandmother states patient has not had a BM since Friday. Senna S to be changed to BID. (this medication also ordered for delivery today). Wiound care completed on left hip and both lower leg wounds and patient tolerated well. Wounds healing well and no sign of infection. Patient c/o soreness to pubic and groin area and minimal redness noted. Barrier cream applied. Bruising to left foot/ankle lighter than this nurse's previous visit. Pt bedbound at this time due to falls and left hip injury. Spoke with Dr. Author Shipley and he states no fracture to left hip but large contusion. Aunt states swelling decreased since injury. Patient having difficulty staying awake during visit but responds when spoken to. Aunt states she is still eating well. Aunt states they are not letting patient out of bed at this time due to fall and pain. Due to patients fall and wound care needed, patient's visits changed to 3 times per week. Patient an dfamily instructed that hospice is available 24/7 and they verbalize understanding.

## 2022-02-22 NOTE — HOSPICE
Problem is spiritual distress related to the loss of function and independence. Goal is to hear and acknowledge Ms. Contreras's emotional and spiritual distress related to her losses. The hospice chaplain met with Ms. Caroline Dickinson for a weekly visit. Ms. Caroline Dickinson commented that she was not feeling well. She explained that she felt tired and had trouble sleeping. During the conversation with the hospice chaplain, Ms. Caroline Dickinson shared that felt depressed. She described experiencing each day as a repetition of the same feelings of frustration and anger related to her loss of function and independence. The hospice chaplain explained that anger was a natural human response; furthermore, it was understandable for Ms. Contreras to feel angry about her circumstances. Ms. Caroline Dickinson seemed relieved to have her feelings validated. The hospice chaplain mentioned the possiblity of identifying a goal that could serve as a way of changing the daily pattern. For instance, the hospice chaplain suggested that Ms. Caroline Dickinson could create art work on her smart phone to share with family. Ms. Caroline Dickinson agreed to try working on more art projects. The hospice chaplain ended the visit with a prayer that acknowledged the presence of anger as a part of God's creation that needed to be voiced and heard. Ms. Caroline Dickinson thanked the  for the prayer and vist.     The plan for the next two weeks is to visit Ms. Caroline Dickinson and encourage her to continue to practice creating art on her smart phone.

## 2022-02-23 NOTE — HOSPICE
Pt received laying on bed and is tearful. Patient states she does not want to live anymore and that the pain medication is not helping her pain. JUANIS Del Rio called and [employer] to increase doseage and frequency of oxycodone received and new dose strength given by Grandmother. Talked with patient, and told bad jokes, for several minutes and she states she feels a little better and smiling after speaking with this nurse. Wound care performed and patient tolerated well. Grandmother states patient not eating well and has not had a BM for several days. Grandmother instructed to make sure she gets her senna regularly. Danish Bustillos contacted and will make weekly visits for spiritual support. Lungs clear and bowel sounds are normal. Discussed brand catheter withpatient and she states she is not sure if she wants one or not. She states she will discuss with family and make decision. Brand catheter kit left at patient's home. Patient and family instructed that hospice is available 24/7 and they verbalize understanding.

## 2022-02-25 NOTE — ED PROVIDER NOTES
Patient is a 69-year-old female with glioblastoma on hospice. Patient had a nosebleed that started at 4 PM today and they try to stop with Afrin without success. Sent her to the ED. Family was requesting stopping the nosebleed and discharged back home for continued hospice care. The history is provided by the patient, medical records and the EMS personnel. Epistaxis   This is a new problem. The current episode started 3 to 5 hours ago. The problem occurs constantly. The problem has not changed since onset. The problem is associated with nothing. The bleeding has been from the left nare. She has tried applying pressure and vasoconstrictors for the symptoms. The treatment provided no relief. Her past medical history is significant for frequent nosebleeds. History reviewed. No pertinent past medical history. No past surgical history on file. History reviewed. No pertinent family history. Social History     Socioeconomic History    Marital status:      Spouse name: Not on file    Number of children: Not on file    Years of education: Not on file    Highest education level: Not on file   Occupational History    Not on file   Tobacco Use    Smoking status: Never Smoker    Smokeless tobacco: Never Used   Substance and Sexual Activity    Alcohol use: Not on file    Drug use: Not on file    Sexual activity: Not on file   Other Topics Concern    Not on file   Social History Narrative    Not on file     Social Determinants of Health     Financial Resource Strain:     Difficulty of Paying Living Expenses: Not on file   Food Insecurity:     Worried About Running Out of Food in the Last Year: Not on file    Mane of Food in the Last Year: Not on file   Transportation Needs:     Lack of Transportation (Medical): Not on file    Lack of Transportation (Non-Medical):  Not on file   Physical Activity:     Days of Exercise per Week: Not on file    Minutes of Exercise per Session: Not on file   Stress:     Feeling of Stress : Not on file   Social Connections:     Frequency of Communication with Friends and Family: Not on file    Frequency of Social Gatherings with Friends and Family: Not on file    Attends Jewish Services: Not on file    Active Member of Clubs or Organizations: Not on file    Attends Club or Organization Meetings: Not on file    Marital Status: Not on file   Intimate Partner Violence:     Fear of Current or Ex-Partner: Not on file    Emotionally Abused: Not on file    Physically Abused: Not on file    Sexually Abused: Not on file   Housing Stability:     Unable to Pay for Housing in the Last Year: Not on file    Number of [place] in the Last Year: Not on file    Unstable Housing in the Last Year: Not on file         ALLERGIES: Patient has no known allergies. Review of Systems   Constitutional: Negative. HENT: Positive for nosebleeds. Eyes: Negative. Respiratory: Negative. Cardiovascular: Negative. Gastrointestinal: Negative. Endocrine: Negative. Genitourinary: Negative. Musculoskeletal: Negative. Skin: Negative. Allergic/Immunologic: Negative. Neurological: Positive for light-headedness. Hematological: Negative. Psychiatric/Behavioral: Negative. Vitals:    02/24/22 1855 02/24/22 1930 02/24/22 2045   BP: (!) 148/90 (!) 156/88 (!) 151/88   Pulse: (!) 104 98 98   Resp: 18  16   Temp: 97.8 °F (36.6 °C)  98 °F (36.7 °C)   SpO2: 98% 94% 95%            Physical Exam  Vitals and nursing note reviewed. Constitutional:       General: She is not in acute distress. Appearance: Normal appearance. She is ill-appearing. HENT:      Head: Normocephalic and atraumatic. Right Ear: External ear normal.      Left Ear: External ear normal.      Nose:      Left Nostril: Epistaxis present. No foreign body, septal hematoma or occlusion. Eyes:      Extraocular Movements: Extraocular movements intact. Conjunctiva/sclera: Conjunctivae normal.   Cardiovascular:      Rate and Rhythm: Normal rate. Pulses: Normal pulses. Radial pulses are 2+ on the right side and 2+ on the left side. Heart sounds: Normal heart sounds. Pulmonary:      Effort: Pulmonary effort is normal.      Breath sounds: Normal breath sounds. Chest:      Chest wall: No deformity or tenderness. Abdominal:      General: Abdomen is flat. There is no distension. Tenderness: There is no abdominal tenderness. Musculoskeletal:         General: No deformity or signs of injury. Normal range of motion. Cervical back: Normal range of motion and neck supple. No tenderness. Skin:     General: Skin is warm and dry. Capillary Refill: Capillary refill takes less than 2 seconds. Neurological:      General: No focal deficit present. Mental Status: She is alert and oriented to person, place, and time. Mental status is at baseline. Psychiatric:         Attention and Perception: Attention normal.         Mood and Affect: Mood normal.         Behavior: Behavior normal.          MDM     LABORATORY RESULTS:  Labs Reviewed - No data to display    MEDICATIONS GIVEN:  Medications   oxymetazoline (AFRIN) 0.05 % nasal spray 2 Spray (2 Sprays Both Nostrils Given 2/24/22 1900)       Differential diagnosis: Epistaxis    MDM: Patient is a 75-year-old female with brain cancer on hospice presenting to the ED with nosebleed that started about 4 PM at family in home health were unable to stop with direct pressure and Afrin who required nasal packing as documented below was stable for discharge home and back in the hospice care. Instructions for care removal of lateral rocket provided in patient's discharge paperwork. Follow-up also provided patient unable to have balloon removed at home. Patient and family state understanding. Ambulance transport arranged. Key discharge instructions and summary of care:  You presented to the ED with a nosebleed. Your medical team had tried Afrin at home without success. Therefore nasal balloon was placed. Leave this in for 1 to 3 days. It may be removed at home if medical staff is comfortable with that. Removal is done by placing a 20 cc syringe to the balloon point all the air out and then sliding the nasal balloon out. Bleeding starts again apply direct pressure. If medical staff cannot arrange for balloon removal please follow-up with ENT and information provided your discharge paperwork. Please take amoxicillin twice a day while the balloon is in place for antibiotic prophylaxis. The patient has been re-evaluated and feeling better. Patient is stable for discharge. All available radiology and laboratory results have been reviewed with patient and/or available family. Patient and/or family verbally conveyed their understanding and agreement of the patient's signs, symptoms, diagnosis, treatment and prognosis and additionally agree to follow-up as recommended in the discharge instructions or to return to the Emergency Department should their condition change or worsen prior to their follow-up appointment. All questions have been answered and patient and/or available family express understanding. IMPRESSION:  1. Epistaxis        DISPOSITION: Discharged    Orestes Su MD          Epistaxis Management    Date/Time: 2/24/2022 7:00 PM  Performed by: Ian Grajeda MD  Authorized by: Ian Grajeda MD     Consent:     Consent obtained:  Verbal    Consent given by:  Patient    Risks discussed:  Bleeding    Alternatives discussed:  No treatment  Procedure details:     Treatment site:  L anterior    Treatment method:  Nasal balloon    Treatment complexity:  Limited    Treatment episode: initial    Post-procedure details:     Assessment:  Bleeding stopped    Patient tolerance of procedure:   Tolerated well, no immediate complications

## 2022-02-25 NOTE — DISCHARGE INSTRUCTIONS
You presented to the ED with a nosebleed. Your medical team had tried Afrin at home without success. Therefore nasal balloon was placed. Leave this in for 1 to 3 days. It may be removed at home if medical staff is comfortable with that. Removal is done by placing a 20 cc syringe to the balloon point all the air out and then sliding the nasal balloon out. Bleeding starts again apply direct pressure. If medical staff cannot arrange for balloon removal please follow-up with ENT and information provided your discharge paperwork. Please take amoxicillin twice a day while the balloon is in place for antibiotic prophylaxis.

## 2022-02-25 NOTE — HOSPICE
Upon arrival patient sleeping but awakens easily to nurse's voice and touch. Patient appearing fatigued, reporting she has pain 5/10 to Lt leg, declining to take analgesic indicating ain is not that bad. Caregivers reports she has had pain medication this morning. Patient verbally stating, \"I'm just ready to go and die\". Patient denies attempt to make self harm. Caregivers reports that patient is feeling more depressed, stating that she is becoming more of a burden for them. Caregivers and this nurse offered therapeutic listening, and comfort to patient, letting her know her caregivers love her and that she is not a burden. Caregivers report they are still working on a plan to decide upon increased home help or patient going to a facility. [facility] has provided a resource list. Nasal rocket noted in left nare, with d/c orders to leave in place 1-3 days, and to utilize prophylactic Amoxicillin 500 mg PO BID x 5 days. Oren Vázquez, NP informed of discharge orders and patient's depressive statements. Orders given to remove nasal rocket on Sunday, and to ensure ras notification for emotional support. RNCM,  weekend scheduling team, and ras informed. [employer] supplies ordered per request. No further care needs expressed, encouraged to call hospice with 24 hour nurse availability.

## 2022-02-25 NOTE — HOSPICE
PRN visit made due to pt having nosebleed. Upon RN's arrival, pt sitting upright in bed, applying pressure to nose with fingers. Nose has been bleeding, bilateral nares, since 4pm. RN held pressure for 15 minutes. No change in bleeding. Received order from NP Jordan Bank to have pt taken to ED for treatment. 911 called. EMTs attempted to stop nosebleed; slight decrease in bleeding observed. EMTs transported pt to [facility] ED. Pt's grandmother Marichuy Ariza followed pt to [facility] in her own car. RN called report to ED nurses' station and notified RN that pt is a hospice pt with [address].

## 2022-02-25 NOTE — ED NOTES
Pt received discharge instructions and verbalized understanding. She left via [facility] in no acute distress.

## 2022-02-27 NOTE — HOSPICE
Arrived at patient's home; patient stated that she pulled out rhino rocket yesterday. Patient denied any epitaxis after. Nasal cavity clean and no blood visualized. Discussed brand catheter with patient and family. Attempted brand catheter insertion with 16 F indwelling but unable to insert after several attempts. Discussed with patient and family that another nurse can attempt this week. Supplies ordered via medline: chux, liners, hand . Reminded family to call hospice number with any needs or concerns. Updated RNCM.

## 2022-02-28 NOTE — HOSPICE
Problem is spiritual distress related to grief in losing indepedence. Goal is to remind Ms. Contreras of her value and purpose. The hospice chaplain completed a follow up visit with Ms. Romeo Freedman who has expressed to hospice staff feelings of sadness and depression related to her sense of self. The hospice chaplain and Ms. Romeo Freedman discussed her love for art. The hospice chaplain asked Ms. Contreras to share some of the drawings that she has created. Ms. Romeo Freedman responded by showing pictures that she carefully colored. The pictures reflected great detail and aptitude for exploring an aray of colors. The hospice chaplain observed that Ms. Romeo Freedman seemed to find both meaning and purpose through her art. The hospice chaplain encouraged her to continue practicing drawing and coloring by using her smart phone. Ms. Romeo Freedman commented that in the past the practice of coloring was very benefical. The hospice chaplain validated Ms. Romeo Freedman by saying that her art was a way of keeping a sense of self by being creative. The hospice chaplain offered a prayer that focused on finding identiy through the gifts and talents given by God. The plan for the next two weeks is to visit Ms. Contreras.

## 2022-03-01 NOTE — HOSPICE
Patient sitting up on bed, alert and oriented x4. Patient denies pain at this time. Caregiver reported noting blood in stool, She reported patient was constipated and was straining. She reports patient has hemorrhoids, but had not noted blood before. Caregiver understand to call if blood is noted with next bm. No other concerns voiced.  Encourage to call with any change in status

## 2022-03-01 NOTE — HOSPICE
PRN visit made due to pt having bleeding during bowel movement. Upon RN's arrival, pt sitting up in bed and her grandmother Lamin Harris present. Pt expressed frustration about her illness and stated she wants to go to hospice house to stay. Pt struggled at times to express herself during visit. Grandmother stated that pt believes she is a burden to her family, so she wants to leave so she can have professional nursing care and not bother her family with her care. RN provided empathetic listening to pt and grandmother; RN and grandmother assured pt that she is not a burden. RN advised pt that her request would be shared with the team, though admission to [facility] is generally based on pt's symptoms/condition. RN inquired about pt's bleeding, which occurred yesterday and tonight, during BMs. Bright red blood observed in bedpan. Upon assessment, pt is not currently bleeding from rectum, though she has external hemorrhoids. Pt states she has had rectal pain (5/10) during these BMs. Encouraged pt to continue with senna-plus tablets twice daily, Preparation H prn and pain medicine prn. Assisted grandmother in bathing patient and providing incontinence care. Reminded grandmother and pt that hospice is available 24/7 for any questions/concerns. They verbalized understanding. Request made to Bibi Estrella and [facility] to follow-up with pt re her request for [facility].

## 2022-03-04 NOTE — HOSPICE
Pt received sitting upright in bed and in NAD with cousin at the home and just finished her breakfast of oatmeal. Pt smiling and states she is feeling a lot better. Patient states no more bleeding issues right now but still has hemorrhoids but pooping ok but cannot remember when last BM was. Pt states she is eating well. No new skin issues and wounds healing well. Wound cleansed and dressing changed on rt lower leg. 2 other wounds healing well and no dressings needed. Lungs clear to auscultation and bowel sounds are normal. Patient instructed that hospice is avialable 24/7 and she verbalizes understanding.

## 2022-03-04 NOTE — HOSPICE
Patient resting quietly on bed, alert and oriented x4 with periods of confusion noted. Patient reports rectal discomfort due ti hemorrhoid. She also reports leg discomfort. Grandmother she had medicated this am for pain as directed. They reported no bleeding noted with last bm. No new concerns voiced.  encourage caregiver to call with any change in patient status

## 2022-03-05 NOTE — HOSPICE
MSW made visit to offer emotional support to patient who returned home from respite. Patient in better spirits on this visit than on her last day of respite. Patient was waking up from a nap when MSW arrived, was very friendly and engaging during visit. Patient reports that she is glad to be home. Patient continue to have some difficulty in expressing herself at times, taking time to find her words. Patient shared that she feels that she has a purpose in life and did not discuss being ready to pass as in past visits. Patient shared that she is appreciative of support from hospice team. MSW met with patient's aunt after the visit. Aunt shared that patient has been doing better today, that patient is having good and bad days. Family still wants support to get Medicaid aide in place. Aunt shared that patient's grandmother has been overworking herself in caring for patient and MSW observed that she was resting when MSW arrived to the home. MSW encouraged family to continue to practice self-care and to let hospice team know if there is further need for support or respite.

## 2022-03-05 NOTE — HOSPICE
MSW made visit with patient in respite at [facility]. Patient reported that she was ready to go home when MSW arrived to visit. Patient could not verbalize what happened that she wanted to go home, but repeatedly stated she was ready to leave. MSW shared that patient is set to go home tomorrow, but patient shared she wanted to go home tonight. MSW could not redirect patient from wanting to go home tonight. Patient's aunt arrived during MSW visit. Aunt shared that patient had called family and told them she wanted to go home as well. Aunt shared that she and patient's grandmother are tired and want patient to come home tomorrow so they can get a rail for patient's bed and have the last night to rest. Aunt brought patient some food and patient was happy to have food from home, but still wants to go home. MSW and aunt talked outside about plans for patient's return home. MSW will continue to assist with trying to get Medicaid aide in place to help with patient's care. Family very appreciative of hospice care and support and know to call with any questions or concerns. MSW made transportation arrangements for patient's return with [facility]  at 12 pm, family would like a tuck in.

## 2022-03-08 NOTE — HOSPICE
Patient resting quietly in bed, patient reported blood with bowel movement, Caregiver reports using perparation H for hemorrhoids. Dressing change to right lower leg wound. No other concerns voiced this visit. encourage caregiver to call with any change in status

## 2022-03-09 NOTE — HOSPICE
This was a follow-up visit to continue support to Ms Edelmira Ybarra in compliance with her care plan. She was in bed and appeared to be sleeping when I arrived. However, she instantly woke up and set-up bed. She was very open with her feelings. She had some difficulty as she searched for words but was clear on what she wanted to communicate. She is thankful for the time that has been given over the last four years, which she see as gift from God. However, she notes that there have been difficult times and she is not certain of the purpose in this current time. However, she is feeling her mother is with her and will greet her when she arrive in the next life. All of this is helping her to wait on God's timing. Visit concluded with prayer. She expressed appreciation for the visit. Thanks for the opportunity to  to her today. Interventions: Supportive listening and conversation to Emanuel Lim on her journey to end of life, affirmation of harris, and prayers. Goal for next two weeks: Continued home visits.

## 2022-03-10 NOTE — HOSPICE
Upon nurse arrival patient sitting upright in bed eating lunch, and smiling. Currently denies pain and SOB . Patient reports she is ready to move, and is tired of living at her current home. Caregiver reports facility research is underway. Helena Koo present and reports patient utilized oxycodone this AM for c/o leg pain, patient unable to provide a number, just verbally stated  \"my leg hurts\". Oxycodone effective in relieving the pain. Skin W/D to touch. Caregiver-Delphine reports she gave patient Lasix 200mg tab today related to right foot swelling. Nurse observed prescription bottle from 10/2021 with directions: Lasix 20mg, 1 tablet PO daily as needed for swelling. Also observed otic suspension of Neomycin and Polymixin B Sulfate and Hydrocortisone from 11/2021. Caregiver-Delphine reports she believes patient has been using it for right ear discomfort. Patient verbalized, \"yes I used it\". Patient endorses ear discomfort, decreased hearing to right ear. Physical assessment revealed 1+ pitting edema to right foot, non-painful upon palpation. Patient and caregiver unable to state when swelling began. Saeed Waite NP notified, ordered for caregivers  to monitor swelling and to keep RLE elevated as tolerated, discard prescriptions of Lasix, Neomycin and Polymixin B Sulfate and Hydrocortisone into the trash, and patient is likely experiencing the right ear discomfort and decreased hearing related to her dx: glioblastoma multiforme. Patient and caregiver Malu Thomas verbalized understanding of education and to discard the prescriptions. Call placed to patients aunt Buddy Root to update her on the visit, and providers instruction to discard prescriptions and cause of right ear discomfort. Buddy Root verbalized understanding. Caregivers encouraged to call hospice for further care concerns.

## 2022-03-11 NOTE — HOSPICE
Upon visit arrival pt. lying on left side in bed and drowsy. Pt. denied pain or SOB at this time. Right foot noted without swelling and has been keeping RLE elevated as tolerated,She stated that right ear is not bothering her as much today. I assisted pt. to sit up in bed. Family member Devon Seay stated that no med refills or supplies needed. Family and pt. encouraged to call Hospice with any status changes or concerns. Understanding verbalized.

## 2022-03-14 NOTE — HOSPICE
Pt received sitting on bed and in NAD with Aunt and Granmother at the home. Pt in good spirits and smiling and denies any pain. Wound on rt lower leg measured, claensed and dressed and left hip and left lower leg wounds are healed. Pt states having normal BM and last was yesterday. Pt states still eating well. Grandmother states patient having stuffy nose and non-productive cough. Lung sounds are clear to auscultation. Aunt requesting respite stay for patient because Grandmother will be out of town for a week and needs started on March 31st. Ann Malin RN from [facility] and Tanner Amaral MSW. notified. Needed supplies and medications ordered. Grandmother also states patient visits can go down to 2 times per week. Patient and family instructed that hospice is avialable 24/7 and they verbalize understanding.

## 2022-03-16 NOTE — HOSPICE
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 Goal is to assist Ms. Alexy Robles in finding ways to compensate for the loss of the use of her hands. The hospice chaplain met with Ms. Alexy Robles for a weekly visit. Ms. Alexy Robles was alert and oriented. Her affect was positive. Ms. Alexy Robles appreciated the 's interest in her artwork. Moreover, Ms. Alexy Robles shared picturs of art work that she completed by using her smart phone. Ms. Alexy Robles noted that she wished she had better use and control of her dorminate hand. Ms. Alexy Robles expressed frustration with the inability to use her dominate hand to draw and color. The hospice chaplain acknowledged her frustration and attempted to focus on the art work displayed on the smart phone which was created by using one or two fingers rather than relying on the dominate hand. Ms. Alexy Robles agreed that she enjoyed using a special toshia to select and then color using her fingers. Ms. Alexy Robles noted that she had received messages from several friends. Their messages of support meant a great deal to her. The hospice chaplain observed that Ms. Alexy Robles appeared to be enjoying a good day. The hospice chaplain and Ms. Alexy Robles shared a prayer thanking God for the gift of creativity. The plan for the next two weeks is to visit MsInez Ben.

## 2022-03-19 NOTE — HOSPICE
Upon arrival patient lying in bed awake, denies pain or difficulty breathing. Patient reports, \"I just took my medication I feel fine\". Caregiver Delphine at bedside and reports patient had seizure yesterday with twitching of BUE and left leg, and that patient was observed with tremors of BUE before the seizure. Lorazepam was administered to patient after the seizure which lasted approximately three minutes. Medications reviewed with caregiver, and reports patient has not been using her Lorazepam Q4 hours as scheduled for tremors and twitching. After review, caregiver verbalized understanding of routine schedule for Lorazepam. Refill of Lorazepam sent to [facility], confirmation #: [id number]. No further care needs or concerns expressed by patient or caregiver. Caregiver encouraged to call hospice for needs with 24 hour nurse availability.

## 2022-03-22 NOTE — HOSPICE
Pt received sitting up in bed and in NAD but not smiling. Grandmother at the bedside and states she has been a little down lately. Pt states she is having generalized pain but states her prescribed pain medication helps. Grandmother states last BM was Sunday after havig a little constipation. SHe also states she had a little rectal bleeding an dthinks is due to hemorrhoids. Rectal area assesssed and no bleeding noted. Bowel sounds are normal and lung sounds are clear to auscultation. Pt provided with an Ipad to use and patient now smiling by the end of visit. Wound care done but appears very moist. Grandmother asking if wound can be left open to air. New wound care willl be to leave wound uncovered during the day and foam pad applied by caregiver at night. Needed medications and supplies ordered. Patient and Claudean Plenty instructed that hospice is available 24/7 and they verbalize understanding.

## 2022-03-25 NOTE — HOSPICE
Problem is spiritual distress related to Ms. Contreras's loss of independence and ability to use her hands to create art. Goal is to help reduce Ms. Contreras's distress by focusing on the reassurance and hope found in her harris in God. The hospice chaplain completed a routine visit with Yonathan Mcallister who appeared tired and experiencing pain. Ms. John Yousif described her pain at as a 8. Ms. John Yousif listened to the hospice chaplain read from her Bible. The hospice chaplain read Scripture passages that Ms. John Yousif had highlighted. The verses mentioned God's enduring presence in the midst of pain and suffering. The hospice chaplain and Ms. John Yousif discussed that not every day will be perfect nor will every day will be terrible. Moreover, some days will between good and bad. Ms. John Yousif and the hospice chaplain agreed that tomorrow could be a better day. The hospice chaplain offered a prayer seeking both reassurance and direction. The plan for the next two weeks is to visit Ms. Contreras.

## 2022-03-25 NOTE — HOSPICE
Upon arrival patient lying in bed awake staring at the ceiling, reports tiredness and not feeling like doing anything today. Reports generalized  pain 4/10 generalized this morning, but has taken medication and it is better, rated as 1/10 now. Caregiver Lucy Epley and Christ Diaz present, reporting this is day three without a BM and requesting suppository to be given. Physical assessment revealed hypoactive BS x4 quadrants, with abdominal distension, + flatus, rectal check + small amount stool. Dulcolax suppository administered. Bowel regimen reviwed with Meg Faith NP, order given to increase Senna to two tablets by mouth two times a day. Lucy Epley and Christ Diaz verbalized understanding of new orders, and was instructed to call hospice if patient does not have a BM after suppository administration today. No further concerns expressed, no supplies needed. Patient will be going to [facility] for respite stay next Wednesday.

## 2022-03-29 NOTE — HOSPICE
Pt receieved sitting up in bed and states she feels down today. Pt states she is unable to use Ipad provided because it is too heavy. Ipad collected by jody terry. Pt denies any pain. Grandmother states patient had bleeding from hemorrhoids last night but stopped. Hemorrhoids swollen but no bleeding on examination. Pt states eating ok and last BM was a couple of days ago. Lungs clear and bowel sounds normal to auscultation. Pt swabbed for covid due to going to [facility] tomorrow but test kit noted to be deffective and covid test to be done later by another nurse. Wound on lower leg healing well and Grandmother states saray are no longer placing a dressing on it and she states is healing better this way. Patient and Grandmother instructed that hospice is available 24/7 and they verbalize understanding.

## 2022-03-30 NOTE — PROGRESS NOTES
[id number] Patient admitted under respite care. Patient has no complaints of pain or restlessness. 1300 Assessment completed. No complaints at this time. 1500 Patient resting with eyes closed and neutral facial expression. 1700 Patient is sitting up in bed eating dinner and tolerating well. Scheduled medications given. Patient does not appear in any discomfort. 1900 Report to oncoming nurse.      NAME OF PATIENT:  Giovanni Lower    LEVEL OF CARE:  Respite    REASON FOR GIP:   n/a    *PATIENT REMAINS ELIGIBLE FOR [facility] LEVEL OF CARE AS EVIDENCED BY: n/a      REASON FOR RESPITE:  Caregiver breakdown    O2 SAFETY:  n/a    FALL INTERVENTIONS PROVIDED:   Implemented/recommended use of non-skid footwear, Implemented/recommended use of fall risk identification flag to all team members, Implemented/recommended resources for alarm system (personal alarm, bed alarm, call bell, etc.) , Implemented/recommended environmental changes (remove hazards, lower bed, improve lighting, etc.) and Implemented/recommended increased supervision/assistance    INTERDISPLINARY COMMUNICATION/COLLABORATION:  Physician, MSW, [place] and RN, CNA    NEW MEDICATION INITIATION DOCUMENTATION:  n/a    Reason medication is being initiated:  N/a    MD / Provider name consulted re: change in status / initiation of new medication:  n/a    New Symptom(s):  n/a    New Order(s):  n/a    Name of the person notified of the changes:  n/a    Name of person being taught:  n/a    Instructions given:  n/a    Side Effects taught:  n/a    Response to teaching:  n/a      COMFORTABLE DYING MEASURE:  Is Patient/family satisfied with symptom level?  yes    DISCHARGE PLAN:  Home at end of respite stay

## 2022-03-30 NOTE — HOSPICE
PRN visit made to perform rapid COVID prior to respite stay at [facility] beginning 3/30/22. Test result negative. Notified [facility] nurses, [address], Huber Dixon and Nusrat Mon by email.

## 2022-03-30 NOTE — PROGRESS NOTES
Problem: Pressure Injury - Risk of  Goal: *Prevention of pressure injury  Description: Document Anderson Scale and appropriate interventions in the flowsheet. Outcome: Progressing Towards Goal  Note: Pressure Injury Interventions:  Sensory Interventions: Assess changes in LOC    Moisture Interventions: Absorbent underpads    Activity Interventions: Pressure redistribution bed/mattress(bed type)    Mobility Interventions: Float heels    Nutrition Interventions: Document food/fluid/supplement intake    Friction and Shear Interventions: Apply protective barrier, creams and emollients                Problem: Patient Education: Go to Patient Education Activity  Goal: Patient/Family Education  Outcome: Progressing Towards Goal     Problem: Falls - Risk of  Goal: *Absence of Falls  Description: Document Fernandez Fall Risk and appropriate interventions in the flowsheet.   Outcome: Progressing Towards Goal  Note: Fall Risk Interventions:       Mentation Interventions: Bed/chair exit alarm    Medication Interventions: Bed/chair exit alarm    Elimination Interventions: Bed/chair exit alarm    History of Falls Interventions: Bed/chair exit alarm         Problem: Patient Education: Go to Patient Education Activity  Goal: Patient/Family Education  Outcome: Progressing Towards Goal

## 2022-03-31 NOTE — HOSPICE
Problem: Ms. Stacey Underwood expressed feeling disconnected from her family. Goal is to hear and acknowledge Ms. Contreras's distress and provide reassurance of God's enduringn presence and support. The hospice chaplain met Ms. Stacey Underwood at the [facility] where she was receiving respite care. The hospice chaplain observed that Ms. Stacey Underwood appeared depressed. When asked about she was doing, Ms. Stacey Underwood responded that she felt disconnected from her family. Ms. Stacey Underwood added that she was tired of being bed bound but was concerned about leaving her bed for fear of falling. The hospice chaplain validated her thoughts and feelings about being disconnected. The hospice chaplain provided the emotional space for Ms. Contreras to explore and articulate addiitonal thoughts about her situation. Ms. Stacey Underwood indicated that she was now tired and needed to rest. The hospice chaplain verbalized understanding and offered to pray for her. Ms. Stacey Underwood thanked the hospice chaplain for both the prayer and visit. The plan for the next two weeks is to respond to Ms. Stacey Underwood and her family's requests for spiritual support.

## 2022-03-31 NOTE — PROGRESS NOTES
0700 Report received from Tasia Childress, [address] Assessment completed. Patient resting with eyes closed and relaxed facial expression. Seizure precautions in place. 0141 Patient scheduled medication given. Patient currently sitting up in bed eating breakfast and tolerating well. 7178 Patient tolerated 100% of her breakfast.     0910 Patient given scheduled medication and incontinence care provided. Patient appears comfortable but says she \"doesn't feel quite right\". When asked about pain/what feels wrong patient stated that she did not know. Dr. Christy Jackson updated. Will continue to check on her frequently due to risk of seizures. 21  Patient is sleeping with relaxed facial expression. 1130 Patient sleeping no sign of distress. 1225 Patient given scheduled medication and patient is sitting up eating lunch. Zach Fernandez 64. Patient moved to hallway and away from windows. 1440 Patient resting in bed. Incontinence care provided. 1500 Patient given bed bath. Tolerated well. Patient now resting with eyes closed. 1700 Patient given scheduled medications and is sitting up eating dinner. 1820 Patient sleeping in bed with relaxed facial expression.      1900 Report to oncoming nurse    NAME OF PATIENT:  Beverely Riedel    LEVEL OF CARE:  Respite    REASON FOR GIP:   n/a    *PATIENT REMAINS ELIGIBLE FOR GIP LEVEL OF CARE AS EVIDENCED BY: n/a    REASON FOR RESPITE:  Caregiver breakdown    O2 SAFETY:  n/a    FALL INTERVENTIONS PROVIDED:   Implemented/recommended use of non-skid footwear, Implemented/recommended use of fall risk identification flag to all team members, Implemented/recommended resources for alarm system (personal alarm, bed alarm, call bell, etc.) , Implemented/recommended environmental changes (remove hazards, lower bed, improve lighting, etc.) and Implemented/recommended increased supervision/assistance    INTERDISPLINARY COMMUNICATION/COLLABORATION:  Physician, SAUMYA, Chelsea Cuellar and RN, CNA    NEW MEDICATION INITIATION DOCUMENTATION:  n/a    Reason medication is being initiated:  n/a    MD / Provider name consulted re: change in status / initiation of new medication:  n/a    New Symptom(s):  n/a    New Order(s):  n/a    Name of the person notified of the changes:  n/a    Name of person being taught:  n/a    Instructions given:  n/a    Side Effects taught:  n/a    Response to teaching:  n/a      COMFORTABLE DYING MEASURE:  Is Patient/family satisfied with symptom level?  yes    DISCHARGE PLAN:  Home when done with respite stay.

## 2022-03-31 NOTE — HOSPICE
1900 Received report from [facility]. 1930 Patient is resting in bed watching videos on her phone. Please flow sheet for assessment. 2017 Gave patient scheduled Ativan 1 mg and Lyrica 100 mg. Patient is resting in bed with no complaints of pain. 2130 Patient called out to use bedpan. 2315 Gave patient scheduled 1 mg of Ativan. Patient is watching TV in bed. 0000 Patient called out to use bedpan. 0200 Patient is sleeping on right side with relaxed face. 0300 Patient is sleeping with relaxed face. 4082 Woke patient up to give ATivan 1 mg scheduled dose, checked brief for incontinence. 3672 Patient is sleeping with relaxed face. 0700 Report given.   NAME OF PATIENT:  Steve General    LEVEL OF CARE:  Respite    REASON FOR GIP:   NA    *PATIENT REMAINS ELIGIBLE FOR GIP LEVEL OF CARE AS EVIDENCED BY: (MUST BE ADDRESSED OF PATIENT GIP)  NA    REASON FOR RESPITE:  Caregiver breakdown    O2 SAFETY:  NA    FALL INTERVENTIONS PROVIDED:   Implemented/recommended use of fall risk identification flag to all team members, Implemented/recommended assistive devices and encouraged their use, Implemented/recommended resources for alarm system (personal alarm, bed alarm, call bell, etc.) , Implemented/recommended environmental changes (remove hazards, lower bed, improve lighting, etc.) and Implemented/recommended increased supervision/assistance    INTERDISPLINARY COMMUNICATION/COLLABORATION:  Physician and RN, CNA    NEW MEDICATION INITIATION DOCUMENTATION:  NA    Reason medication is being initiated:  SILVESTRE    MD / Provider name consulted re: change in status / initiation of new medication:  NA    New Symptom(s):  NA    New Order(s):  NA    Name of the person notified of the changes:  NA    Name of person being taught:  NA    Instructions given:  NA    Side Effects taught:  NA    Response to teaching:  NA      COMFORTABLE DYING MEASURE:  Is Patient/family satisfied with symptom level?  yes    DISCHARGE PLAN:  Home once respite stay is complete.

## 2022-04-01 NOTE — PROGRESS NOTES
Problem: Pressure Injury - Risk of  Goal: *Prevention of pressure injury  Description: Document Anderson Scale and appropriate interventions in the flowsheet. Outcome: Progressing Towards Goal  Note: Pressure Injury Interventions:  Sensory Interventions: Assess changes in LOC    Moisture Interventions: Absorbent underpads,Minimize layers    Activity Interventions: Pressure redistribution bed/mattress(bed type)    Mobility Interventions: Float heels    Nutrition Interventions: Document food/fluid/supplement intake,Offer support with meals,snacks and hydration    Friction and Shear Interventions: Apply protective barrier, creams and emollients                Problem: Patient Education: Go to Patient Education Activity  Goal: Patient/Family Education  Outcome: Progressing Towards Goal     Problem: Falls - Risk of  Goal: *Absence of Falls  Description: Document Fernandez Fall Risk and appropriate interventions in the flowsheet.   Outcome: Progressing Towards Goal  Note: Fall Risk Interventions:       Mentation Interventions: Bed/chair exit alarm    Medication Interventions: Bed/chair exit alarm    Elimination Interventions: Bed/chair exit alarm,Call light in reach    History of Falls Interventions: Bed/chair exit alarm         Problem: Patient Education: Go to Patient Education Activity  Goal: Patient/Family Education  Outcome: Progressing Towards Goal

## 2022-04-01 NOTE — PROGRESS NOTES
[id number] report received Naila Rizzo RN     0800 assessment completed; morning medications given; breakfast tray set up for patient; repositioned patient in bed     0935 bed bath given; patient had large, soft BM    1115 patient sleeping    1235 scheduled medications given; lunch tray set up for patient     1400 patient had BM; loose, semiformed stool; incontinence care completed    1530 patient sleeping     1615 patient's aunt at bedside; discussed possibly brand catheter insertion; both patient and aunt agreed to catheter     1715 LCSW at bedside; scheduled medications given; senna held due to loose stools today; dinner tray set up     1805 patient stating she is in pain; PRN morphine given; brand catheter inserted; patient tolerated well    1840 patient resting comfortably     1900 report given to night shift RN     NAME OF PATIENT:  Lv Bryant    LEVEL OF CARE:  Respite    REASON FOR GIP:   N/A    *PATIENT REMAINS ELIGIBLE FOR GIP LEVEL OF CARE AS EVIDENCED BY: (MUST BE ADDRESSED OF PATIENT GIP)  N/A    REASON FOR RESPITE:  Caregiver cannot care for patient due to:  exhaustion    O2 SAFETY:  N/A    FALL INTERVENTIONS PROVIDED:   Implemented/recommended resources for alarm system (personal alarm, bed alarm, call bell, etc.) , Implemented/recommended environmental changes (remove hazards, lower bed, improve lighting, etc.) and Implemented/recommended increased supervision/assistance    INTERDISPLINARY COMMUNICATION/COLLABORATION:  Physician, MSW, [place] and RN, CNA    NEW MEDICATION INITIATION DOCUMENTATION:  N/A    Reason medication is being initiated:  N/A    MD / Provider name consulted re: change in status / initiation of new medication:  N/A    New Symptom(s):  N/A    New Order(s):  N/A    Name of the person notified of the changes:  N/A    Name of person being taught:  N/A    Instructions given:  N/A    Side Effects taught: N/A    Response to teaching:  N/A      COMFORTABLE DYING MEASURE:  Is Patient/family satisfied with symptom level?  yes    DISCHARGE PLAN:  Home after respite stay

## 2022-04-01 NOTE — HOSPICE
1900 Received report from [facility]. 1945 Patient is bed resting watching videos. Please see flow sheet for assessment. 2005 Patient called out for bedpan, patient had already went in her brief. Cleaned patient up and helped reposition in bed. Gave scheduled medication of Ativan  Mg, and Lyrica 100 mg. Patient is resting in bed eating a snack. 2215 Patient called out stated that she thinks she went to the restroom. Changed patient. She became very tearful, stating she wants her old life back. Nurse sat with patient and listened letting her express her frustration of the disease process. 0000 Patient is still awake checked her brief which is dry. 0200 Patient is sleeping on her left side with relaxed face. 0425 Woke patient for her scheduled Ativan 1 mg tablet, and checked brief which is dry. Patient went back to sleep. 0630 Patient is sleeping with a relaxed face. 0700 Report given.     NAME OF PATIENT:  Dia Vargas    LEVEL OF CARE:  Respite    REASON FOR GIP:   NA    *PATIENT REMAINS ELIGIBLE FOR GIP LEVEL OF CARE AS EVIDENCED BY: (MUST BE ADDRESSED OF PATIENT GIP)  NA    REASON FOR RESPITE:  Caregiver breakdown    O2 SAFETY:  NA    FALL INTERVENTIONS PROVIDED:   Implemented/recommended use of fall risk identification flag to all team members, Implemented/recommended assistive devices and encouraged their use, Implemented/recommended resources for alarm system (personal alarm, bed alarm, call bell, etc.) , Implemented/recommended environmental changes (remove hazards, lower bed, improve lighting, etc.) and Implemented/recommended increased supervision/assistance    INTERDISPLINARY COMMUNICATION/COLLABORATION:  PhysicianMyah and RN, YOLANDA    NEW MEDICATION INITIATION DOCUMENTATION:  NA    Reason medication is being initiated:  NA    MD / Provider name consulted re: change in status / initiation of new medication: NA    New Symptom(s):  NA  New Order(s):  NA    Name of the person notified of the changes:  NA    Name of person being taught:  NA    Instructions given: NA  Side Effects taught:  NA    Response to teaching:  NA      COMFORTABLE DYING MEASURE:  Is Patient/family satisfied with symptom level?  yes    DISCHARGE PLAN:  Home once respite stay is complete.

## 2022-04-01 NOTE — HOSPICE
Problem: Ms. Jaspal Fong appeared sad and expressed feelings of loneliness from being away from her family. Goal is to hear and acknowledge Ms. Contreras's distress and identify coping skills to decrease sense of loneliness. The hospice chaplain completed a follow up visit with Ms. Jaspal Fong who continued to express feelings of sadness and loneliness as a result of being away from her family. Ms. Janneth Romero affect was flat as she described missing her grandmother's presence. The hospice chaplain listened and acknowledged her sadness. Ms. Jaspal Fong responded positively to the 's visit. After sharing her thoughts and feelings, Ms. Janneth Romero affect became more positive as result. The hospice chaplain offered a blessing to Ms. Contreras. The plan for the next two weeks is to respond to Ms. Jaspal Fong and her family's requests for spiritual support.

## 2022-04-02 NOTE — PROGRESS NOTES
0700 Bedside and Verbal shift change report given to Olya Martinez (oncoming nurse) by Sheela Edwards (offgoing nurse). Report included the following information SBAR, Kardex, Intake/Output and MAR.   1600 bed alarm alerting. Pt attempting to exit bed. Pt states she is \"going home\". Reoriented patient. Noted chucks soiled. Pt cleaned, new rebecca applied. brand leaking while repositioning pt. Line checked for kinks-repositioned, confirmed secured to leg. Will continue to monitor. NAME OF PATIENT:  Steve General    LEVEL OF CARE:  Respite    REASON FOR RESPITE:  Caregiver breakdown    O2 SAFETY:  room air    FALL INTERVENTIONS PROVIDED:   Implemented/recommended use of non-skid footwear, Implemented/recommended assistive devices and encouraged their use, Implemented/recommended resources for alarm system (personal alarm, bed alarm, call bell, etc.)  and Implemented/recommended environmental changes (remove hazards, lower bed, improve lighting, etc.)    INTERDISPLINARY COMMUNICATION/COLLABORATION:  Physician, MSW, [place] and RN, CNA    NEW MEDICATION INITIATION DOCUMENTATION:  n/a    Reason medication is being initiated:  n/a    MD / Provider name consulted re: change in status / initiation of new medication:  n/a    New Symptom(s):  n/a    New Order(s):  n/a    Name of the person notified of the changes:  n/a    Name of person being taught:  n/a    Instructions given:  n/a    Side Effects taught:  n/a    Response to teaching:  n/a      COMFORTABLE DYING MEASURE:  Is Patient/family satisfied with symptom level?  yes    DISCHARGE PLAN:  Home with grandmother and aunt. Aunt [phone].

## 2022-04-03 NOTE — PROGRESS NOTES
Problem: Pressure Injury - Risk of  Goal: *Prevention of pressure injury  Description: Document Anderson Scale and appropriate interventions in the flowsheet. Outcome: Progressing Towards Goal  Note: Pressure Injury Interventions:  Sensory Interventions: Assess changes in LOC    Moisture Interventions: Absorbent underpads,Apply protective barrier, creams and emollients    Activity Interventions: Assess need for specialty bed    Mobility Interventions: Float heels,HOB 30 degrees or less    Nutrition Interventions: Document food/fluid/supplement intake,Offer support with meals,snacks and hydration    Friction and Shear Interventions: Apply protective barrier, creams and emollients,Lift sheet,Minimize layers                Problem: Falls - Risk of  Goal: *Absence of Falls  Description: Document Fernandez Fall Risk and appropriate interventions in the flowsheet.   Outcome: Progressing Towards Goal  Note: Fall Risk Interventions:       Mentation Interventions: Bed/chair exit alarm,Door open when patient unattended    Medication Interventions: Bed/chair exit alarm,Evaluate medications/consider consulting pharmacy    Elimination Interventions: Bed/chair exit alarm,Call light in reach    History of Falls Interventions: Bed/chair exit alarm,Door open when patient unattended,Evaluate medications/consider consulting pharmacy

## 2022-04-03 NOTE — PROGRESS NOTES
Problem: Pressure Injury - Risk of  Goal: *Prevention of pressure injury  Description: Document Anderson Scale and appropriate interventions in the flowsheet. Outcome: Progressing Towards Goal  Note: Pressure Injury Interventions:  Sensory Interventions: Assess changes in LOC,Check visual cues for pain,Float heels,Minimize linen layers    Moisture Interventions: Absorbent underpads,Apply protective barrier, creams and emollients,Internal/External urinary devices,Minimize layers    Activity Interventions: Assess need for specialty bed    Mobility Interventions: Float heels,HOB 30 degrees or less    Nutrition Interventions: Document food/fluid/supplement intake    Friction and Shear Interventions: Apply protective barrier, creams and emollients,HOB 30 degrees or less,Minimize layers                Problem: Falls - Risk of  Goal: *Absence of Falls  Description: Document Fernandez Fall Risk and appropriate interventions in the flowsheet.   Outcome: Progressing Towards Goal  Note: Fall Risk Interventions:       Mentation Interventions: Bed/chair exit alarm,Adequate sleep, hydration, pain control    Medication Interventions: Bed/chair exit alarm    Elimination Interventions: Bed/chair exit alarm,Call light in reach    History of Falls Interventions: Bed/chair exit alarm,Door open when patient unattended

## 2022-04-03 NOTE — HOSPICE
1900: report received from Geoffrey Ware RN    1087: pt is laying in bed quietly. She is drowsy and oriented to self and place. 2100: scheduled bedtime lyrica and ativan given with water. Pt turned onto her R side by YOLANDA Encinas but pt frequently turns herself in the bed. L side is flaccid. See flowsheet for full assessment     2258: scheduled PO ativan given. SL morphine given for pain rated 9/10. Pt states the pain is generalized     0025: pt turned herself onto L side. Appears calm and comfortable     0218: pt resting quietly with relaxed facial expression and unlabored respirations     0408: scheduled Po ativan given. Pt repositioned in bed. Pt became confused and asked to be reoriented to her surroundings and the reason why she is at [facility]. Pt reoriented and she verbalized understanding but stated she is disappointed she is not going home today    0520: pt is sleeping soundly.  No grimacing noted    0700: report given to oncoming nurse       NAME OF PATIENT:  Reba Carrizales    LEVEL OF CARE: Respite    REASON FOR GIP:   n/a    *PATIENT REMAINS ELIGIBLE FOR [facility] LEVEL OF CARE AS EVIDENCED BY: n/a      REASON FOR RESPITE:  Caregiver breakdown    O2 SAFETY:  n/a    FALL INTERVENTIONS PROVIDED:   Implemented/recommended resources for alarm system (personal alarm, bed alarm, call bell, etc.) , Implemented/recommended environmental changes (remove hazards, lower bed, improve lighting, etc.) and Implemented/recommended increased supervision/assistance    INTERDISPLINARY COMMUNICATION/COLLABORATION:  Physician, MSW, [place] and RN, CNA    NEW MEDICATION INITIATION DOCUMENTATION:  n/a    Reason medication is being initiated: n/a    MD / Provider name consulted re: change in status / initiation of new medication:  n/a    New Symptom(s): n/a    New Order(s): n/a    Name of the person notified of the changesn/a    Name of person being taught:  n/a    Instructions given:  n/a    Side Effects taught: n/a    Response to teaching: n/a      COMFORTABLE DYING MEASURE:  Is Patient/family satisfied with symptom level?  yes    DISCHARGE PLAN: Pt will return home at end of respite stay

## 2022-04-03 NOTE — PROGRESS NOTES
..  0700:Report received from [id number] using SBAR I/O and Kardex. Pt is lethargic complain of abd pain, repositioned for comfort. 07:53:Adm morphine 10 mg for pain to the abd.  0900:Pt stated her pain is better, eating breakfast.  09:59:Adm am meds, tolerated well. Pt ate 100% of her breakfast.  11:45:Pt had a full bed bath, clothes and linen change. 1300: Adm schedule meds, tolerated well. 13:57:Pt complains of abd pain adm PRN morpine, tolerated well  14:45:Pt sleeping no discomfort noted. 15:58:Repositioned for comfort, denies paint at present. 17:20:Adm schedule meds, tolerated well. 17:45:Pt had a large BM, incontinent care done, barrier cream applied repositioned for comfort. 18:15:Pt ate 100% of her meal, no complaints. 1900:Report given to [id number]            NAME OF PATIENT:      LEVEL OF CARE: Respite  REASON FOR GIP: N/A     *PATIENT REMAINS ELIGIBLE FOR GIP LEVEL OF CARE AS EVIDENCED BY:n/a    REASON FOR RESPITE:  Caregiver breakdown   O2 SAFETY:  Concentrator positioning (6\" from furniture/drapes), Tanks stored in david , No petroleum based products on face while oxygen in use and Oxygen sign on the door     FALL INTERVENTIONS PROVIDED:   Implemented/recommended use of fall risk identification flag to all team members, Implemented/recommended assistive devices and encouraged their use, Implemented/recommended resources for alarm system (personal alarm, bed alarm, call bell, etc.)  and Implemented/recommended environmental changes (remove hazards, lower bed, improve lighting, etc.)     INTERDISPLINARY COMMUNICATION/COLLABORATION:  Physician, MSW, [place] and RN, CNA     NEW MEDICATION INITIATION DOCUMENTATION:N/A      Reason medication is being initiated:  N/A     MD / Provider name consulted re: change in status / initiation of new medication:  N/A     New Symptom(s):  N/A     New Order(s):  N/A     Name of the person notified of the changes:  N/A     Name of person being taught: N/A     Instructions given:  N/A     Side Effects taught:  N/A     Response to teaching:  N/A        COMFORTABLE DYING MEASURE:  Is Patient/family satisfied with symptom level?  yes     DISCHARGE PLAN:Pt will complete respite stay and go home with family.

## 2022-04-04 NOTE — PROGRESS NOTES
Problem: Pressure Injury - Risk of  Goal: *Prevention of pressure injury  Description: Document Anderson Scale and appropriate interventions in the flowsheet. Outcome: Resolved/Met     Problem: Patient Education: Go to Patient Education Activity  Goal: Patient/Family Education  Outcome: Resolved/Met     Problem: Falls - Risk of  Goal: *Absence of Falls  Description: Document Fernandez Fall Risk and appropriate interventions in the flowsheet.   Outcome: Resolved/Met     Problem: Patient Education: Go to Patient Education Activity  Goal: Patient/Family Education  Outcome: Resolved/Met     Problem: Infection - Risk of, Urinary Catheter-Associated Urinary Tract Infection  Goal: *Absence of infection signs and symptoms  Outcome: Resolved/Met     Problem: Patient Education: Go to Patient Education Activity  Goal: Patient/Family Education  Outcome: Resolved/Met

## 2022-04-04 NOTE — HOSPICE
1900: report received from [facility], [address] [facility]: assessment completed. Pt is very confused and somewhat restless. She is difficult to reorient at times. See flowsheet for full assessment     2104: scheduled PO ativan and lyrica given. Pt is very restless attempting to get out of bed even after being reoriented. Pt is pulling at brand and bedrails. When given PO ativan pt had an extremely difficult time swallowing it, she took multiple sips of water and several bites of pudding before she was able to swallow it. Pt continues to attempt to get out of bed. RN remaining at bedside for pt safety. Pt requested pudding, pudding cup provided to pt and she attempted to drink it. 2137: pt is grimacing PRN SL morphine given. Pt turned onto L side, complains of severe itching around rectum and labia. Pt wiped off with a cool cloth    2225:PRN morphine effective    2245: pt now resting quietly    0027: scheduled PO ativan given crushed in applesauce. Pt needed prompting to close mouth around spoon and to swallow    0220: pt is sleeping soundly    0350: scheduled PO ativan crushed and given with pudding. SL morphine also given for pin, pt unable to describe or rate pain, pt states \"I just hurt, I just feel weird\"    0763: pt scratching labia, reports severe itching. Pt jasmin are cleaned with cool cloth and patted dry. Pt turned onto L side    0600: pt is sleeping soundly.  1050mL of yellow urine emptied from brand    0700: report given to oncoming nurse     NAME OF PATIENT:  Cari Oksanajaquan    LEVEL OF CARE: Respite    REASON FOR GIP:   n/a    *PATIENT REMAINS ELIGIBLE FOR GIP LEVEL OF CARE AS EVIDENCED BY: (MUST BE ADDRESSED OF PATIENT GIP)      REASON FOR RESPITE:  Caregiver breakdown    O2 SAFETY:  n/a    FALL INTERVENTIONS PROVIDED:   Implemented/recommended resources for alarm system (personal alarm, bed alarm, call bell, etc.) , Implemented/recommended environmental changes (remove hazards, lower bed, improve lighting, etc.) and Implemented/recommended increased supervision/assistance    INTERDISPLINARY COMMUNICATION/COLLABORATION:  Physician, MSW, [place] and RN, CNA    NEW MEDICATION INITIATION DOCUMENTATION:  n/a    Reason medication is being initiated: n/a    MD / Provider name consulted re: change in status / initiation of new medication: n/a    New Symptom(s): n/a    New Order(s): n/a    Name of the person notified of the changes: n/a    Name of person being taught: n/a  Instructions given: n/a    Side Effects taught: n/a    Response to teaching: n/a      COMFORTABLE DYING MEASURE:  Is Patient/family satisfied with symptom level?  yes    DISCHARGE PLAN: Pt will return home at end of respite stay

## 2022-04-04 NOTE — HOSPICE
Problem: Ms. Arley Magana has felt disconnected from her family. Goal is to offer reassurance to Ms. Contreras that her feeling of loneliness is only temporary. Ms. Aurelia Gurrola affect was more positive. She shared that she was looking forward to returning home and being with her family. Ms. Arley Magana had previously commented to the hospice chaplain that she missed her family. The hospice chaplain heard and acknowledged her frustration with being apart from family. The hospice chaplain reassured Ms. Contreras that her sense of loneliness would decrease once she sees her family. Ms. Arley Magana smiled upon hearing that. The plan for the next two weeks is to visit Ms. Contreras.

## 2022-04-04 NOTE — HOSPICE
MSW made routine visit with patient, patient's grandmother also present during visit. Patient alert and orietned x 4 during visit, patient having trouble getting words out and expressing her thoughts. Patient appeared frustrated when she cannot get her thoughts out. Patient shared that she does still want to go to a nursing home as she is concerned about being too much work for her grandmother. Patient did share that she is happy that her grandmother is getting a chance to visit family for her birthday. MSW reassured patient that hospice team will visit her in respite and MSW will continue to work on helping her get things in place for facility placement. Patient appreciative of visit and support.

## 2022-04-04 NOTE — PROGRESS NOTES
Problem: Pressure Injury - Risk of  Goal: *Prevention of pressure injury  Description: Document Anderson Scale and appropriate interventions in the flowsheet. Outcome: Progressing Towards Goal  Note: Pressure Injury Interventions:  Sensory Interventions: Assess changes in LOC,Check visual cues for pain,Float heels,Minimize linen layers    Moisture Interventions: Absorbent underpads,Apply protective barrier, creams and emollients,Minimize layers    Activity Interventions: Assess need for specialty bed    Mobility Interventions: Float heels,HOB 30 degrees or less    Nutrition Interventions: Document food/fluid/supplement intake    Friction and Shear Interventions: Apply protective barrier, creams and emollients,HOB 30 degrees or less,Minimize layers                Problem: Falls - Risk of  Goal: *Absence of Falls  Description: Document Fernandez Fall Risk and appropriate interventions in the flowsheet.   Outcome: Progressing Towards Goal  Note: Fall Risk Interventions:       Mentation Interventions: Bed/chair exit alarm,Adequate sleep, hydration, pain control,Door open when patient unattended    Medication Interventions: Bed/chair exit alarm    Elimination Interventions: Bed/chair exit alarm,Call light in reach    History of Falls Interventions: Bed/chair exit alarm,Door open when patient unattended         Problem: Infection - Risk of, Urinary Catheter-Associated Urinary Tract Infection  Goal: *Absence of infection signs and symptoms  Outcome: Progressing Towards Goal

## 2022-04-04 NOTE — PROGRESS NOTES
...  0700:Report received from [facility] using SBAR I/O and Kardex. Pt is sleeping no distress or discomfort noted,   08:44:Adm am meds, tolerated well, meal setup  1000:Pt ate 100% of her meal, Dr Fanny Xie rounds no new orders. Sarabia patent and drainin  11:26:Adm morphine 10 mg for pain, full bed bath given. 11:45:GEOVANNI Gomes at bedside, report given pt aunt updated on discharge. 1200:Transport arrived pt left with all her meds and personal belongings. NAME OF PATIENT:      LEVEL OF CARE: Respite  REASON FOR GIP: N/A     *PATIENT REMAINS ELIGIBLE FOR GIP LEVEL OF CARE AS EVIDENCED BY:n/a    REASON FOR RESPITE:  Caregiver breakdown     O2 SAFETY:  Concentrator positioning (6\" from furniture/drapes), Tanks stored in david , No petroleum based products on face while oxygen in use and Oxygen sign on the door     FALL INTERVENTIONS PROVIDED:   Implemented/recommended use of fall risk identification flag to all team members, Implemented/recommended assistive devices and encouraged their use, Implemented/recommended resources for alarm system (personal alarm, bed alarm, call bell, etc.)  and Implemented/recommended environmental changes (remove hazards, lower bed, improve lighting, etc.)     INTERDISPLINARY COMMUNICATION/COLLABORATION:  Physician, MSW, [place] and RN, CNA     NEW MEDICATION INITIATION DOCUMENTATION:N/A      Reason medication is being initiated:  N/A     MD / Provider name consulted re: change in status / initiation of new medication:  N/A     New Symptom(s):  N/A     New Order(s):  N/A     Name of the person notified of the changes:  N/A     Name of person being taught:  N/A     Instructions given:  N/A     Side Effects taught:  N/A     Response to teaching:  N/A        COMFORTABLE DYING MEASURE:  Is Patient/family satisfied with symptom level?  yes     DISCHARGE PLAN:Pt will be discharge today.

## 2022-04-05 NOTE — HOSPICE
Received email that patient needed urgent visit for posible seizure activity. On arrival, patient laying on left side and lethargic but able to answer simple questions. Grandmother states that earlier, patient screamed out in pain and had her rt arm outstretched and she called triage and was told to give lorazepam and gave as directed. Patient hot to the touch and found to have an axillary temp of 102.4. Tylenol given rectally and grandmother instructed to give tylenol every 6 hours as directed orally or rectally if unable to take pills if fever persists. Grandmother states patient pulled out her brand catheter, last night, that was inserted while she was at [facility]. Patient c/o pain in her head and all over and that she has burning with urination. Mk Thomas NP called and TVO for cipro received and RX3 called to fill for stat delivery. Grandmother instructed that antibiotics would be delivered. Grandmother states she never received supplies ordered last week and this nurse instructed that will find out and reorder if necessary. Grandmother instructed that hospice is avialable 24/7 and she verbalizes understanding.

## 2022-04-11 NOTE — HOSPICE
Upon arrival patient sitting up in bed looking at artwork on her cellular phone. Patient denies pain. Reports she is waiting on her pizza. Grandmother at bedside, reports no new complaints since last nurse visit. No care supplies needed. Reid confirmation #: [id number] for refill of  Haldol in [facility]. Patient and caregivers encouraged to call Hospice for further care needs with 24 hour nurse availability.

## 2022-04-12 NOTE — HOSPICE
Pt received sitting up in hospital bed and eating lunch. Patient confused but states she is having pain to her legs all the time and today left leg hurts. Grandmother at the bedside and states she is c/o leg pain every day. Grandmother assisted with placing ,linen on bed and cleaning and changing patient. Pt tolerated well. Medications reviewed with grandmother and she verbalizes understanding. Grandmother states received senna and lorazepam yesterday but did not receive oxycodone. Jesica Norman followed up and Esther Adjutant states lashell forgot to send yesterday and will deliver stat today. JUANIS Del Rio called about leg pain and TVO for gabapentin received and ordered. Family asking not to clean or dress wound to rt lower leg. Wound shows no s/s of infection and healing well. Grandmother states pt had large, loose BM yesterday and tried to stand to help grandmother but patient unable to stand. Patient has duplicate medications being delivered automatically and Aunt instructed that after review of medications, will let her know which ones to cancel. Supplies ordered. Grandmother instructed that hospice is avialable 24/7 and she verbalizes understanding.

## 2022-04-13 NOTE — HOSPICE
Problem is spiritual distress related to Ms. Fernando Ortiz experiencing an increase sense of loss of control. Goal is to validate Ms. Contreras's sense of loss and provide support by sharing Scripture and prayer. The hospice chaplain visited Ms. Contreras this morning. Ms. Fernando Ortiz appeared tired and hesitant to talk. She explained that she felt tired from her illness and sad about her circumstances. Ms. Fernando Ortiz agreed to the hospice chaplain reading part of the [facility] narrative from the [place]. The hospice chaplain chose to [facility] words \"Be not afraid. \" The hospice chaplain acknowledged Ms. Contreras's difficult and trying circumstances; however, the hospice chaplain reminded her of the hope found in the story of Yohan' resurrection from the suffering and death of this world. Ms. Fernando Ortiz responded positively to the reading by praying to God. Afterwards, Ms. Fernando Ortiz thanked the hospice chaplain for the visit. The plan for the next two weeks is to respond to the family's request for spiritual support.

## 2022-04-15 NOTE — HOSPICE
Patient received sitting up in bed upon visit arrival. Patient's aunt, Giuseppe Armijo, and grandmother, Ewelina Holland are present in the home and are patient's primary caregivers. Patient is pleasant, alert, and oriented to person, place, and situation. She denies any pain or shortness of breath. She appears to struggle to find her words at times, but is able to make her needs known. Assessment completed. Healing wound to right lower extremity left open to air, per grandmother's request. No drainage noted. Rash noted under patient's right breast. Rash is red and flat. Patient reports rash is not painful but is itchy. Topical 2% miconazole ointment ordered by Ricki Landis NP to treat rash. Patient complains of difficulty sleeping on this visit. Melatonin 5 mg ordered by Ricki Landis NP for insomnia. Patient also states she has noticed vaginal discharge consistent with a vaginal yeast infection. Fluconazole 150 mg ordered by Ricki Landis NP to treat vulvovaginal candidiasis. Melatonin and fluconazole ordered via [employer] (confirmation # [id number]). Patient's aunt asked if hospice can provide a \"swing\" (referring to a enrico lift device) to assist with transferring patient to and from bed side commode. Primary RNCM and clinical manager notified of this request. Chux pads, incontinence underwear, and miconazole antifungal ointment ordered via [employer] (confirmation # [id number]). Patient and caregivers deny and further needs at this time. They verbalize understanding to call hospice 24/7 with any needs.

## 2022-04-19 NOTE — HOSPICE
Pt received sleeping but easily aroused and in NAD with Grandmother at the bedside. Pt asked how she feels and states she has pain all over and in her head. Medications for pain reviewed with Grandmother and instructed to give as needed. All other medications also reviewed. Grandmother states pt has been having regular BMs and last was yesterday. She states her appetite waxes and wanes. Lung sounds clear and bowel sounds hypoactive to auscultation. Wound on rt leg his healed. Seizure education also given and Grandmother verbalizes understanding. Mario Chiang NP called this nurse and informed that patient is having insomnia. Received TVO from Mario Chiang NP for Ambien and ordered. Pt given cloth pads and Grandmother informed that we can no longer provide disposable white pads and she states she will just order them on her own. Patient and Grandmother [facility] and they verbalize understanding. Use LCD Guidelines and list features:   Cancer Diagnoses      ___X_____ A. Disease with distant metastases at presentation OR     ___X_____ B. Progression from an earlier stage of disease to metastatic disease with either:  ___X_____ 1. continued decline in spite of therapy  ________ 2. patient declines further disease directed therapy    Late entry: Wound removed from wound charting incorectly.  WIll add wound

## 2022-04-22 NOTE — HOSPICE
Upon arrival patient sitting up in her bed eating potato chips and looking at artwork on her cell phone, reports she is feeling better today. Caregivers Mickey Cody and Ruthann Noa present in the home. Caregivers reports patient has been complaining of headaches a lot more, patient with low energy, poor appetite, seizure yesterday lasting x1 minute, and diarrhea x3 yesterday. Reports patient did not have any foods item out of her normal intake. Caregivers educated to hold Senna, and to notify hospice with further further episodes of multiple loose stools of diarrhea after nurse visit today. Caregiver reports patient has begun taking newly prescribed sleep aid at . No medication refills needed, [employer]line order completed for briefs and liners, order# [id number]. As nurse was exiting room at end of visit patient stopped eating, and verbalized she felt as though she was going to have a seizure. Nurse observed minimal twitching to right eye, increased respiratory rate, patient medicated with Lorazepam 4mg x1 SL, patient calmed Junior Lester to her caregivers that she was sorry. Patient  then began to complain of pain and SOB, unable to verbalize location of pain. Patient medicated with Morphine 10mg SL, patient calmed down, SOB relieved. Carmen Titus, NP notified, Morphine ordered updated to include use PRN for SOB, no further orders given . Caregivers verbalized understanding to repeat Lorazepam for anxiety episode PRN and Morphine PRN for SOB episodes. Patient verbalizing repeatedly, \"I want to die take me to the hospital, so they can put the mask on me, I want to die\". Console provided by this nurse and her caregivers. Patient resting calmly and began to fall asleep. Respiratory rate 18/minute. Caregivers appreciative nurse was present to witness this event. Caregivers remained to call hospice for further care needs and diarrhea episodes.

## 2022-04-24 NOTE — HOSPICE
MSW made visit with patient in respite at [facility]. Patient alert and oriented x 4. Patient coping well with respite, but noted that she was ready to go home. Patient also shared that she still wants to go to a nursing home, feeling that she is a burden on her family. MSW shared that MSW is assisting with addressing this concern as there is an issue with patient's Medicaid. Patient shared in life review, talked about how she is happy that her grandmother had a chance to visit family. MSW left so that patient could eat dinner. MSW called patient's aunt. Aunt shared that she is very appreciative of respite support, noting that with patient in respite and her mother out of town, she gets a weekend to herself, which she has not had in a very long time. MSW discussed discharge plans for next week. Respite to end on Monday, aunt would like transportation for 12:30pm. MSW will set up transportation for Monday.

## 2022-04-25 NOTE — HOSPICE
Ben Peter  had just leaving home and patient's depression discussed. Pt received on hospital bed and grandmother finishing-up cleaning after BM. This nurse assited placing pull-up on patient. Grandmother states stool just loose now and no longer watery. Pt appears depressed and asks multiple times to go to hospital and find out what is wrong with her. Pt asked why she needs to go to hospital and states she doesn't know. Pt instructed that there is no need to go to hospital and she will be supported by family and hospice and states that is fine. Pt c/o pain to left leg. Grandmother states patient needs lotion and lotion placed on patient's extremities and trunk. Medications reviewed with grandmother. Needed medications and supplies ordered. Grandmother states patient's appetite still not good. Lungs clear and bowel sounds normal to auscultation. After long discussion with patient, she states she understands that family and hospice is there for her and agrees to stay at home. Patient and family instructed that hospice is avialable 24/7 and they verbalize understanding.

## 2022-04-26 NOTE — HOSPICE
Problem is spiritual distress related to Ms. Contreras's loss of independence. Goal is to reduce Ms. Contreras's spiritual distress by reminding her of her continued worth and purpose as a child of God. The hospice chaplain observed that Ms. Carly Giles appeared depressed. Ms. Carly Giles had just experienced an episode of incontenice which required her grandmother's assistance. Ms. Carly Giles commented to the hospice chaplain that she felt like a burden. The hospice chaplain used examples from Ms. Contreras's harris tradition to illustrate how she remains a person of value and purpose. The hospice chaplain described Ms. Carly Giles as a child of God who will always be identified as such. Ms. Carly Giles appeared to find reassurance and comfort in knowing this. The hospice chaplain encouraged Ms. Contreras to look for [employer] which take different forms throughout the day. Ms. Carly Giles agreed to focus on identifying these blessings and also considering herself to be a blessing to others. The plan for the next two weeks is to visit Ms. Contreras. Quality 226: Preventive Care And Screening: Tobacco Use: Screening And Cessation Intervention: Patient screened for tobacco use and is an ex/non-smoker Quality 130: Documentation Of Current Medications In The Medical Record: Current Medications Documented Quality 431: Preventive Care And Screening: Unhealthy Alcohol Use - Screening: Patient not identified as an unhealthy alcohol user when screened for unhealthy alcohol use using a systematic screening method Detail Level: Detailed

## 2022-05-02 NOTE — HOSPICE
Patient sitting upright in bed awake,looking at art photos smiling, and reports being in good spirits. Patient and her caregivers deny pain and difficulty breathing. Caregivers Ken Duane and Feliz Leon present reporting need for medicated ointments Diprolene and Miconazole, Enclara refill order placed. No other medication refills needed, no personal care supplies needed. Medications reviewed with caregivers, and all covered medications will be removed from the pre-packaged prescription box from [address] [place] [address],[address]. Feliz Leon to notify pharmacy today to ensure company does not send Hospice covered medications. Caregivers have a pill box, which they will begin using on Sunday. Caregivers declined to have nurse set up box today, but will need assist with refill weekly on nurse visits. No further care needs expressed. Encouraged to call hospice with further care needs.

## 2022-05-03 NOTE — HOSPICE
MSW missed second visit with patient for the month. MSW will continue to reach out to patient and family for support and to assist with issues around [place].

## 2022-05-03 NOTE — HOSPICE
MSW missed second visit with patient for the month. MSW will continue to reach out to patient and family for support.

## 2022-05-04 NOTE — HOSPICE
Jose Daniel Mark was laying in bed very lethargic this visit. she reported she her lower back was hurting, Ally Chowdary( grandmother) reported pt has not received any of her morning medications. Pain medication as well as all other medications were given to pt during nurse present. Pt has eczema on her back in which caregivers have been applying medicated cream as order. sn assessment completed, no other issues or concerns voiced by patient or Ally Chowdary prior to nurse leaving, encouraged calling hospice with questions, concerns or changes in patient status.             Plan for next sn visit please order additional medications that was not ordered on this nurse last visit     medication refills  tegretol, pregabalin  supplies ordered: diapers, chux( I did not order the superabsorb pads) con# [id number]

## 2022-05-06 NOTE — HOSPICE
Nawaf Wilson called this nurse and states unable to turn and clean patient because too munch pain and family asking for guidance. Grandmother instructed to give morphine for pain. On arrival, patient in mild pain distress but allowing this nurse and HHA to turn and clean her (patient had large, soft BM). Grandmother educated on when to give pain medicine. Grandmother states patient is saying she wants to go to [facility]. Aunt states she thinks patient is having increased depression today and yesterday because is the anniversary of patient's mother passing. Patient asked how she feels and states \"I want to die\" Pt answering questions appropriately but appears drowsy. Excoriation noted to groin area and HHA applied barrier cream. Aunt states patient was having twitching last night and received extra lorazepam as prescribed and has been more drowsy today. Alex, RN manager and Devi Rehman MSW called to arrange [facility] admission. Grandmother states patient's appetite waxes and wanes and not good for the past 2 days. Medications ordered to be delivered to [facility]. Patient, Aunt, and Grandmotehr instructed that hospice is avialable 24/7 and they verbalize understanding.

## 2022-05-06 NOTE — HOSPICE
Patient resting on hospital bed and in NAD. Patient swabbed for covid and is negative. [facility].

## 2022-05-06 NOTE — HOSPICE
Problem: This week is the anniversary of Ms. Contreras's mother's death. Typically,  the family marks the ocassion with a meal and flowers; however, this year Ms. Eli Garcia has been recluctant  to participate. Ms. Eli Garcia explained that her grief has been made heavier by her own physical decline. Ms. Eli Garcia described her wish to remove the pain from her body and mind. The hospice chaplain acknowledged the existence of the pain and then suggested to Ms. Contreras that she remembered Yohan' words of surrendering \"burdens\" in return for a lighter load. Ms. Eli Garcia appeared to understand both the reference and 's explanation of surrendering problems to God. The hospice chaplain and Ms. Eli Garcia explored what if would be like if Ms. Eli Garcia could have an imaginary conversation with her mother. The hospice chaplain encouraged Ms. Contreras to reflect on what her mother would say to her. Ms. Eli Garcia acknowledged that thinking about her mother in that way was helpful. Throughout the remaining conversation, Ms. Abhishek Aj affect improved. The hospice chaplain offered a closing prayer that lifted up Ms. Contreras's concerns. The plan for the next two weeks is to respond to Ms. Contreras's request for spiritual support.

## 2022-05-07 NOTE — PROGRESS NOTES
Problem: Pressure Injury - Risk of  Goal: *Prevention of pressure injury  Description: Document Anderson Scale and appropriate interventions in the flowsheet. Outcome: Progressing Towards Goal  Note: Pressure Injury Interventions:  Sensory Interventions: Float heels,Minimize linen layers,Turn and reposition approx. every two hours (pillows and wedges if needed),Check visual cues for pain,Keep linens dry and wrinkle-free,Monitor skin under medical devices    Moisture Interventions: Absorbent underpads,Minimize layers,Limit adult briefs,Moisture barrier    Activity Interventions: Assess need for specialty bed    Mobility Interventions: Assess need for specialty bed,Float heels,Turn and reposition approx. every two hours(pillow and wedges)    Nutrition Interventions: Document food/fluid/supplement intake    Friction and Shear Interventions: Apply protective barrier, creams and emollients,Minimize layers,Lift sheet                Problem: Patient Education: Go to Patient Education Activity  Goal: Patient/Family Education  Outcome: Progressing Towards Goal     Problem: Risk for Falls  Goal: Free of falls during inpatient stay  Description: Patient will be free of falls during inpatient stay. Outcome: Progressing Towards Goal     Problem: Pain  Goal: Assess satisfaction of level of comfort and symptom control  Outcome: Progressing Towards Goal     Problem: Anxiety/Agitation  Goal: Verbalize or staff assess the ability to manage anxiety  Description: The patient/family/caregiver will verbalize and demonstrate ability to manage the patient's anxiety throughout hospice care.   Outcome: Progressing Towards Goal

## 2022-05-07 NOTE — PROGRESS NOTES
Problem: Pressure Injury - Risk of  Goal: *Prevention of pressure injury  Description: Document Anderson Scale and appropriate interventions in the flowsheet. Outcome: Progressing Towards Goal  Note: Pressure Injury Interventions:  Sensory Interventions: Assess changes in LOC    Moisture Interventions: Absorbent underpads    Activity Interventions: Pressure redistribution bed/mattress(bed type)    Mobility Interventions: Float heels,Pressure redistribution bed/mattress (bed type)    Nutrition Interventions: Document food/fluid/supplement intake                     Problem: Pain  Goal: Assess satisfaction of level of comfort and symptom control  Outcome: Progressing Towards Goal     Problem: Anxiety/Agitation  Goal: Verbalize or staff assess the ability to manage anxiety  Description: The patient/family/caregiver will verbalize and demonstrate ability to manage the patient's anxiety throughout hospice care.   Outcome: Progressing Towards Goal

## 2022-05-07 NOTE — PROGRESS NOTES
NAME OF PATIENT:  Dain Weber    LEVEL OF CARE:  Respite  REASON FOR RESPITE:  Caregiver Exhaustion    O2 SAFETY:  On Room Air    FALL INTERVENTIONS PROVIDED:   Implemented/recommended resources for alarm system (personal alarm, bed alarm, call bell, etc.)  and Implemented/recommended increased supervision/assistance    INTERDISPLINARY COMMUNICATION/COLLABORATION:  Physician, MSW, [place] and RN, CNA    NEW MEDICATION INITIATION DOCUMENTATION:  N/A    Reason medication is being initiated:  N/A    MD / Provider name consulted re: change in status / initiation of new medication:  N/A    New Symptom(s):  N/A    New Order(s):  N/A    Name of the person notified of the changes:  Family available via phone    Name of person being taught:  Client reviewed safety    Instructions given:  N/A    Side Effects taught:  N/A    Response to teaching:  N/A      COMFORTABLE DYING MEASURE:  Is Patient/family satisfied with symptom level?  yes    DISCHARGE PLAN:  Home with hospice  1952  Client awake. Expressive aphasia noted and client advising some anxiety but does not want medication yet \"in about an hour\"  Agreed to bring with night time meds. Pain is \"ok\"  Talked about how she enjoyed menu for the day. PA completed. Will continue to monitor  2130  Scheduled medications provided. PRN doses of Senna S, lorazepam and Oxycodone per request.  Reports she is hurting allover. Incontinent moderate amount of urine. Elen care provided. Positioned on L side. Will continue to monitor  2330  Awake and contemplating. Reports she is just here. Can't do anything. And this points to L leg and arm. Active listening. PRovided with ice cream.  Turned lights down and assisted with change of position. Denies pain. Will continue to monitor  0117  Client awake. Incontinent lrg amount of urine. Elen care provided and positioned on L side. Denies c/os.   Will continue to monitor  0211  Resting quietly with eyes closed and unlabored respirations. Will continue to monitor  0308  Resting quietly with eyes closed and unlabored respirations. Has turned tv on. Will continue to monitor  0516  Resting quietly with eyes closed and unlabored respirations. Will continue to monitor  4843  Incontinent lrg amount of urine and jasmin care provided. Also assisted with oral care. Client reports she wants to go home. Will ask SW to visit and talk with her today r/t plans.     0700  Report to oncoming shift

## 2022-05-07 NOTE — PROGRESS NOTES
NAME OF PATIENT:  Reba Carrizales    LEVEL OF CARE:  Respite  REASON FOR RESPITE:  Caregiver exhaustion    O2 SAFETY:  On room Air    FALL INTERVENTIONS PROVIDED:   Implemented/recommended use of non-skid footwear, Implemented/recommended resources for alarm system (personal alarm, bed alarm, call bell, etc.)  and Implemented/recommended increased supervision/assistance    INTERDISPLINARY COMMUNICATION/COLLABORATION:  Physician, MSW, [place] and RN, CNA    NEW MEDICATION INITIATION DOCUMENTATION:  N/A    Reason medication is being initiated:  N/A    MD / Provider name consulted re: change in status / initiation of new medication:  N/A    New Symptom(s):  N/A    New Order(s):  N/A    Name of the person notified of the changes:  N/A    Name of person being taught:  Client reinforced safety    Instructions given:  Reminder to use call bell with needs    Side Effects taught:  N/A    Response to teaching:  N/A      COMFORTABLE DYING MEASURE:  Is Patient/family satisfied with symptom level?  yes    DISCHARGE PLAN:  Home after respite stay  2051  PA completed. Skin irritated with L hip protrusion noted. PRN dose of lorazepam per request for anxiety. Denies other c/o. Incontinent lrg amount of urine and jasmin care provided. Positioned on R side but client quickly became uncomfortable and requested I remove pillows so positioned supine again. Will continue to monitor  2145  Scheduled medications as per MARs. PRN dose of Oxycodone as reported pain all over. Vanilla ice cream per request.  Client refused further position change. Reports she is going to color and drink some more water. Will continue to monitor  2250  Quietly coloring on phone. No signs of distress. 0001  Denies c/os  Requested snack and crackers provided. Incontinent lrg amount of urine. Jasmin care provided. Positioned on L side as client let me do this.   Will continue to monitor   0100  Resting quietly with eyes closed and unlabored respirations. Will continue to monitor  [id number]  Client heard another pt on hallway who was upset and this caused her distress. C/o pain all over and feeling restless. Reoriented to place and situation. PRN dose of oxycodone and lorazepam as per MARs. Client incontinent moderate amount of urine. Jasmin care provided and positioned on L side. Will continue to monitor  0249  Resting with eyes closed, relaxed facial and extremity muscles noted. Continuing to monitor  0445  Resting quietly with eyes closed and unlabored respirations. Continuing to monitor  0620 Incontinent sm amount of urine. Sleeping soundly but did help with turning some. Resumed sleep s/p jasmin care.   Will continue to monitor  0700  Report to oncoming shift

## 2022-05-07 NOTE — H&P
[facility]  [phone]    Patient Name: Jerome Patel  YOB: 1980    Date of Provider Hospice Visit: 05/07/22    Level of Care:   [] General Inpatient (GIP)    [] Routine   [x] Respite    Current Location of Care:  [] [facility] [place] [] [facility] [] [facility] [] [facility] [x] Hospice House THE [facility]      Principle Hospice Diagnosis: Glioblastoma    Chart reviewed especially note from Darry Fleischer at .'s note on 5/6 states patient has been maybe little bit more depressedanniversary of her mother's passing. She definitely is eating less and her mentation seems to wax and wane. During my exam on 5/7, patient definitely appears thinner, struggling a little more with expressive aphasia and also a little more confused on where she was and why she was here. She still was able to eat and remains with left-sided weakness. HOSPICE SUMMARY     Chart Reviewed for patient's medical history and hospice care plan. Hospice Physician Certification/Recertification Narrative per Shahzad Harris / other MD:     Patient remains on service secondary to glioblastoma. She also has a history of seizures. She had a face-to-face visit done on 6/22 for recertification. Patient recertified if she is showing increasing weakness, not able to ambulate, increasing forgetfulness/confusion. Continues have evidence of some expressive aphasia. Patient certainly appropriate for recertification given her advanced glioblastoma. Patient sent to the hospice house secondary to caregiver exhaustion but also patient appears to be having more symptoms. General awake, able to answer some basic questions but she definitely was not clear where she was and was talking about appointments coming up.   HEENT moist oral mucosa  Lungsclear to auscultation  Cardiovascularregular rate and rhythm  Abdomensoft  Neuroleft-sided paresis, expressive aphasia    Patient being admitted for Respite care x 5 days for   [x]  Caregiver exhaustion and needing break  []  Caregiver unavailable       PLAN   1. Patient's home medications were reviewed and reconciled. Continue with current home medications and plan of care as outlined in chart. 2. Medications revieweddefinitely can stop her Zetia  3. We will continue to monitor for symptom burdensince arrival patient has required 3 as needed doses of Ativan, 1 as needed dose of morphine, 3 as needed doses of oxycodone. May need to consider scheduled medications but we will continue to monitor. 4. Clearly seen at the client patient compared to the last respite stay. 5.  and SW to support family needs. 6. Disposition: Home with hospice once respite stay is completed.

## 2022-05-07 NOTE — HOSPICE
0700 Report received from [facility]. 0710 Patient resting in bed quietly, eyes are closed. Patient appears to be sleeping at this time. 8120 Patient sitting up in bed eating in breakfast, states she wants to wait to take her medications until she is done with breakfast.     0915 Patient complaining of pain and anxiety, PRN oxycodone and lorazepam given. Patient given scheduled medications as well. 5162 Patient given bed bath, clothes change, and linen change. Patient tolerated activity well and states she is ready for a nap now. 1015 Patient resting in bed quietly, eyes are closed, neutral facial expression noted. PRN medications effective. 1125 Fresh ice water gotten for patient. 1220 Patient's lunch tray set up for patient, patient excited, states food looks really good. 1240 Patient eating lunch and visiting with family at the bedside. 1350 Patient ate 100% of meal.    1411 Patient given scheduled decadron along with PRN Oxycodone for head ache. 1500 Patient resting in bed quietly, eyes are closed. PRN medication effecitve. 32 [phone] 16 Patient checked for incontinence, patient is clean and dry at this time. 1725 Patient sitting up in bed eating dinner. Patient states she would like to take her evening medications after she is finished eating. 1820 Patient complaining of head ache, PRN oxycodone given as well as scheduled medications, see MAR. Fresh ice water gotten for patient.

## 2022-05-08 NOTE — HOSPICE
This LMSW received notification this morning from [facility] that pt would like to go home. LMSW arrived and visited pt to assess needs. Pt awake and alert, with mostly eaten food tray in front of her. Pt stated she is okay but just wants to go home. LMSW asked if pt would like to complete respite stay or stay an extra day and pt declined. LMSW called pt's aunt Vesta. [place] stated she is able to accept pt at home. LMSW arranged [facility] with [facility] for 2:30 pm.  LMSW informed [place] who verbalized understanding. LMSW informed pt who expressed appreciation. 1:45 PM:  LMSW cancelled transportation for this afternoon due to pt's condition. LMSW and Aviva Driver called pt's aunt Vesta who verbalized agreement.   Plan is to keep pt at [facility] overnight and reevaluate in the AM.      Christal Dawkins, [address] [facility]    [phone]

## 2022-05-08 NOTE — PROGRESS NOTES
Problem: Pressure Injury - Risk of  Goal: *Prevention of pressure injury  Description: Document Anderson Scale and appropriate interventions in the flowsheet. Outcome: Progressing Towards Goal  Note: Pressure Injury Interventions:  Sensory Interventions: Assess changes in LOC,Keep linens dry and wrinkle-free    Moisture Interventions: Absorbent underpads    Activity Interventions: Pressure redistribution bed/mattress(bed type)    Mobility Interventions: Pressure redistribution bed/mattress (bed type)    Nutrition Interventions: Document food/fluid/supplement intake    Friction and Shear Interventions: Apply protective barrier, creams and emollients,Minimize layers,Lift sheet                Problem: Patient Education: Go to Patient Education Activity  Goal: Patient/Family Education  Outcome: Progressing Towards Goal     Problem: Risk for Falls  Goal: Free of falls during inpatient stay  Description: Patient will be free of falls during inpatient stay. Outcome: Progressing Towards Goal     Problem: Anxiety/Agitation  Goal: Verbalize or staff assess the ability to manage anxiety  Description: The patient/family/caregiver will verbalize and demonstrate ability to manage the patient's anxiety throughout hospice care.   Outcome: Progressing Towards Goal

## 2022-05-08 NOTE — PROGRESS NOTES
Problem: Pressure Injury - Risk of  Goal: *Prevention of pressure injury  Description: Document Anderson Scale and appropriate interventions in the flowsheet. Outcome: Progressing Towards Goal  Note: Pressure Injury Interventions:  Sensory Interventions: Assess changes in LOC,Keep linens dry and wrinkle-free    Moisture Interventions: Absorbent underpads    Activity Interventions: Pressure redistribution bed/mattress(bed type)    Mobility Interventions: Pressure redistribution bed/mattress (bed type)    Nutrition Interventions: Document food/fluid/supplement intake    Friction and Shear Interventions: Apply protective barrier, creams and emollients,Minimize layers,Lift sheet                Problem: Risk for Falls  Goal: Free of falls during inpatient stay  Description: Patient will be free of falls during inpatient stay. Outcome: Progressing Towards Goal     Problem: Pain  Goal: Assess satisfaction of level of comfort and symptom control  Outcome: Progressing Towards Goal     Problem: Anxiety/Agitation  Goal: Verbalize or staff assess the ability to manage anxiety  Description: The patient/family/caregiver will verbalize and demonstrate ability to manage the patient's anxiety throughout hospice care.   Outcome: Progressing Towards Goal

## 2022-05-08 NOTE — HOSPICE
0700 Report received from [facility]. 0740 Patient sitting in bed quietly, playing on cell phone. Patient states she has just been feeling sad lately, emotional support offered. Fresh ice water gotten for patient per patient request.    0800 Patient's breakfast tray set up for patient. 6916 Patient ate 100% of meal, patient given scheduled medications as well as PRN Roxicodone and lorazepam for pain/anxiety. Patient states breakfast was very good but she wants to take a nap now. Belongings within reach and bed alarm on.     2626 Patient resting in bed quietly, eyes are closed, patient appears to be sleeping at this time. 1035 Patient resting in bed quietly, eyes are closed, patient appears to be sleeping at this time. 1100 Patient given bed bath, clothes change, and linen change. Patient tolerated activity well. Patient states she is feeling less sad than this morning and she is looking forward to lunch. 1200 Patient's lunch tray set up for patient, patient given scheduled medication, see MAR.     5367 Patient ate 50% of meal, patient states she doesn't feel very hungry right now. 1310 MSW at the bedside, patient states she is homesick and wants to see her family. Early discharge planned for 1430. MD notified. 1325 Patient states she feels \"bad\". Patient states she feels scared and is hurting and feels like she will pass out. Patient given PRN SL lorazepam and PRN SL morphine, see MAR.    1330 Patient's facial expression became flat, eyes glossy and fixed, tongue sticking out of mouth. Patient not responding. Patient given PRN SL lorazepam for apparent absence seizure. Will stay with patient for safety. 1340 Patient no longer having seizure activity, MD notified, patient's aunt called, patient's aunt is in agreement for patient to stay here to make sure patient is stable.      1350 Patient states she feels scared and like she is going to die, patient states she wanted to get home before it happens. Stayed with patient and offered emotional support, patient fell asleep shortly. 1400 Patient's aunt came to see patient. 46 Patient's aunt left the bedside. 1440 Patient resting in bed quietly, eyes are closed, neutral facial expression noted, respirations are even and unlabored. 1450 Patient kicked blanket off of herself, patient noted to feel warm. Temperature checked, it was 99.2 axillary. 1510 Patient groggy but opening her eyes and able to answer some questions. Patient states she feels better right now and understands that leaving to go home may not be a good idea and she is okay with staying here a little longer. 32 [phone] 16 Patient resting in bed quietly, eyes are closed, neutral facial expression noted, respirations are even and unlabored. 1630 Patient voided, jasmin care provided and brief changed. Patient's eyes opening briefly with movement, patient appeared to fall back asleep. 1700 Patient's dinner tray left at bedside for when patient wakes up. 1730 Patient resting in bed quietly, eyes are closed, respirations are even and unlabored. Patient does not wake to voice/touch. 1810 Patient checked for incontinence with GEOVANNI Cabral, patient clean and dry at this time. Patient repositioned in bed with GEOVANNI Cabral, patient briefly opened eyes with movement but would not respond to voice or questions.  Dr. Jiame Spencer notified patient still not awake, evening medications to be given at bedtime, if patient still not awake to take medications at that time, plan is to notify MD.

## 2022-05-09 NOTE — H&P
[facility]  [phone]    Patient Name: Courtney Amin  YOB: 1980    Date of Provider Hospice Visit: 05/09/22    Level of Care:   [] General Inpatient (GIP)    [] Routine   [x] Respite    Current Location of Care:  [] [facility] [] [facility] [] [facility] [] [facility] [x] Hospice [facility]      Principle Hospice Diagnosis: Glioblastoma    Chart reviewed especially note from Ana M Figueroa at .'s note on 5/6 states patient has been maybe little bit more depressedanniversary of her mother's passing. She definitely is eating less and her mentation seems to wax and wane. During my exam on 5/7, patient definitely appears thinner, struggling a little more with expressive aphasia and also a little more confused on where she was and why she was here. She still was able to eat and remains with left-sided weakness. 5/9; pt seen in rounds. Appears comfortable, sitting up and eating a burger for lunch. Her grandmother is at bedside. Pt smiling and denies any symptoms at this time. Does have expressive aphasia and slow delayed responses. Had episode of Sz yesterday and was postictal but now back to baseline almost. Intermittently she does c/o back pain and generalised body ache. She is on oxycodone IR 10mg prn that she is using about 4 doses daily. Got 1 prn dose of s/l morphine 10mg as well. Pt is on several medications orally; increased pill burden; she wishes to reduce the no of pills she has to take and is interested in us looking at med list and making suggestions to stop. HOSPICE SUMMARY     Chart Reviewed for patient's medical history and hospice care plan. Hospice Physician Certification/Recertification Narrative per Bayron Lucero / anurag LAO:     Patient remains on service secondary to glioblastoma. She also has a history of seizures. She had a face-to-face visit done on 7/41 for recertification.   Patient recertified if she is showing increasing weakness, not able to ambulate, increasing forgetfulness/confusion. Continues have evidence of some expressive aphasia. Patient certainly appropriate for recertification given her advanced glioblastoma. Patient sent to the hospice house secondary to caregiver exhaustion but also patient appears to be having more symptoms. General awake, able to comprehend and answer simple questions   HEENT moist oral mucosa  Lungsclear to auscultation  Cardiovascularregular rate and rhythm  Abdomensoft  Neuroleft-sided paresis, expressive aphasia    Patient being admitted for Respite care x 5 days for   [x]  Caregiver exhaustion and needing break  []  Caregiver unavailable    Hospice Symptoms  - Cancer related pain  - Neuropathic pain  - Seizures  - Fatigue/weakness  - Hospice care       PLAN   1. Patient's home medications were reviewed and reconciled. Will adjust and stop some meds that are not needed at this time; Zetia, glucotrol, metformin, Lyrica. 2. Add scheduled Oxycodone ER 20mg every 12 hours and continue with prn oxycodone and/or morphine s/l for now. If pt is unable to maintain swallowing long acting pills then will consider just scheduling s/l morphine  3. Increase Lyrica to 50mg three times daily for chronic neuropathic adjuvant pain control/antiseizure, d/c gabapentin  4. Michaell Rossi has been increased to 750mg twice daily and vimpat to be continued as well for seizures management  5. Continue other symptoms meds as needed  6. Discussed with patient and grandmother in length and reviewed med changes and they are understanding and pleased to do so.    7.  and SW to support family needs. 8. Disposition: Home with hospice once respite stay is completed.

## 2022-05-09 NOTE — HOSPICE
MSW made routine visit with patient and family. MSW got inpatient agreement and visitation guidelines signed with patient's aunt. Family shared that they are doing okay, but in need of a break; noted that patient is declining and getting a lot more forgetful. MSW visited with patient. Patient asked MSW where she was going, MSW reminded her that she is going to [facility] for respite, patient remembered when MSW talked about it. Patient observed sitting up in bed, appeared to have less mobility in use of her left arm. Patient continues to struggle with getting her words out and voicing her thoughts which frustrates her. Patient tired, MSW left so she could rest before transportation to [facility] arrives.

## 2022-05-09 NOTE — PROGRESS NOTES
NAME OF PATIENT:  Eris Christianson    LEVEL OF CARE:  Respite    REASON FOR RESPITE:  Caregiver exhaustion    O2 SAFETY:  On Room Air    FALL INTERVENTIONS PROVIDED:   Implemented/recommended use of non-skid footwear, Implemented/recommended resources for alarm system (personal alarm, bed alarm, call bell, etc.)  and Implemented/recommended increased supervision/assistance    INTERDISPLINARY COMMUNICATION/COLLABORATION:  Physician, MSW, [place] and RN, CNA    NEW MEDICATION INITIATION DOCUMENTATION:  N/A    Reason medication is being initiated:  N/A    MD / Provider name consulted re: change in status / initiation of new medication:  N/A    New Symptom(s):  N/A  Awakening s/p seizure with ativan earlier    New Order(s):  N/A    Name of the person notified of the changes:  Family available if needed    Name of person being taught:  Client- educated as to events of the day    Instructions given:  N/A    Side Effects taught:  N/A    Response to teaching:  N/A      COMFORTABLE DYING MEASURE:  Is Patient/family satisfied with symptom level?  yes    DISCHARGE PLAN:  Home   2010  PA completed. Client responding a little and helped me turn her to side. Soft verbalizations noted. PPP HRR at 84. RR of 16 and unlabored appearance. Will continue to monitor  2128 Elen care provided as client incontinent sm amount of urine. Awoke and took medications. Increasing alertness. Educated as to events of day. Provided with container of ice cream.  Will continue to monitor  2234  Resting soundly with eyes closed and unlabored respirations  0030  Resting quietly with eyes closed and unlabored respirations. Has cup of water (covered) in bed. Placed on BST. No signs of distress. 0215  Resting soundly with unlabored respirations. Will continue to monitor  0323  Incontinent moderate amount of urine. Lethargic and did not respond vocally.   Helped turn to L side but was not able to help turn to R and received help from nurse on floor.  Positioned on R. Client without signs of distress. Will continue to monitor  0511  Resting quietly with unlabored respirations and eyes closed. Will continue to monitor  0601  Resting quietly on R side with unlabored respirations and eyes closed. Depends dry.   Will continue to monitor  0700  Report to oncoming shift

## 2022-05-09 NOTE — PROGRESS NOTES
Problem: Pressure Injury - Risk of  Goal: *Prevention of pressure injury  Description: Document Anderson Scale and appropriate interventions in the flowsheet. Outcome: Progressing Towards Goal  Note: Pressure Injury Interventions:  Sensory Interventions: Float heels,Keep linens dry and wrinkle-free    Moisture Interventions: Absorbent underpads    Activity Interventions: Pressure redistribution bed/mattress(bed type)    Mobility Interventions: Pressure redistribution bed/mattress (bed type)    Nutrition Interventions: Document food/fluid/supplement intake    Friction and Shear Interventions: Apply protective barrier, creams and emollients,Minimize layers,Lift sheet                Problem: Risk for Falls  Goal: Free of falls during inpatient stay  Description: Patient will be free of falls during inpatient stay. Outcome: Progressing Towards Goal     Problem: Pain  Goal: Assess satisfaction of level of comfort and symptom control  Outcome: Progressing Towards Goal     Problem: Anxiety/Agitation  Goal: Verbalize or staff assess the ability to manage anxiety  Description: The patient/family/caregiver will verbalize and demonstrate ability to manage the patient's anxiety throughout hospice care.   Outcome: Progressing Towards Goal

## 2022-05-09 NOTE — PROGRESS NOTES
Problem: Pressure Injury - Risk of  Goal: *Prevention of pressure injury  Description: Document Anderson Scale and appropriate interventions in the flowsheet. Outcome: Progressing Towards Goal  Note: Pressure Injury Interventions:  Sensory Interventions: Float heels,Check visual cues for pain,Assess changes in LOC    Moisture Interventions: Apply protective barrier, creams and emollients,Limit adult briefs,Maintain skin hydration (lotion/cream)    Activity Interventions: Pressure redistribution bed/mattress(bed type)    Mobility Interventions: Pressure redistribution bed/mattress (bed type),HOB 30 degrees or less    Nutrition Interventions: Document food/fluid/supplement intake,Offer support with meals,snacks and hydration    Friction and Shear Interventions: Apply protective barrier, creams and emollients,HOB 30 degrees or less,Lift sheet                Problem: Patient Education: Go to Patient Education Activity  Goal: Patient/Family Education  Outcome: Progressing Towards Goal     Problem: Risk for Falls  Goal: Free of falls during inpatient stay  Description: Patient will be free of falls during inpatient stay. Outcome: Progressing Towards Goal     Problem: Pain  Goal: Assess satisfaction of level of comfort and symptom control  Outcome: Progressing Towards Goal     Problem: Anxiety/Agitation  Goal: Verbalize or staff assess the ability to manage anxiety  Description: The patient/family/caregiver will verbalize and demonstrate ability to manage the patient's anxiety throughout hospice care. Outcome: Progressing Towards Goal     Problem: Falls - Risk of  Goal: *Absence of Falls  Description: Document Morene Hole Fall Risk and appropriate interventions in the flowsheet.   Outcome: Progressing Towards Goal  Note: Fall Risk Interventions:       Mentation Interventions: Bed/chair exit alarm,Adequate sleep, hydration, pain control,Reorient patient    Medication Interventions: Bed/chair exit alarm    Elimination Interventions: Bed/chair exit alarm,Call light in reach    History of Falls Interventions: Bed/chair exit alarm,Door open when patient unattended         Problem: Patient Education: Go to Patient Education Activity  Goal: Patient/Family Education  Outcome: Progressing Towards Goal

## 2022-05-09 NOTE — PROGRESS NOTES
0700  Report received  0715  Pt lying in bed, asleep. Pt aroused, slightly confused. Speech delayed at times. Pt turned and repositioned in bed. Right side Flaccid. No co pain. Lungs clear but diminished. Pt is on RA. No cough noted.  + bowel sounds. Last reported BM was 5-6. Pt is incontinent of urine. Depend is dry. No edema noted. Pt has numerous stretch marks on her entire body that she scratches. Scrotum and Elen area have some excoriation. Areas covered with Antifungal.   0745  Pt repositioned with CNA. Pt co pain in her lower back  Pt holding her back. Brows furrowed. Pt moving about in bed.    0753  Pt medicated with Morphine and Lorazepam.  0830  Pt asleep. 0845  Pt sitting up in bed, wanting to know where her Breakfast is. No co pain . 0930  Pt tolerated 100% of her breakfast.    1030  Pt tolerated all of her am meds without difficulty. 1200  Pt sleeping soundly. No grimacing. 1300  Pt sitting up in bed, Grandmother at the bedside. Pt eating her lunch. 1510  Pt resting. No complaints. 1620  Pt snoring. 1745  Pt sitting up in bed, eating her dinner. Pt continues to have difficulty getting her words out. Pt able to feed self.  1822  Pt tolerated her meds without difficulty.       1900  Report given      NAME OF PATIENT:  Lv Bryant    LEVEL OF CARE:  Respite    REASON FOR GIP:  n/a    *PATIENT REMAINS ELIGIBLE FOR [facility] LEVEL OF CARE AS EVIDENCED BY: (MUST BE ADDRESSED OF PATIENT GIP)  n/a      REASON FOR RESPITE:  Caregiver cannot care for patient due to:  exhaustion    O2 SAFETY:  RA    FALL INTERVENTIONS PROVIDED:   Implemented/recommended use of non-skid footwear, Implemented/recommended use of fall risk identification flag to all team members, Implemented/recommended assistive devices and encouraged their use, Implemented/recommended resources for alarm system (personal alarm, bed alarm, call bell, etc.) , Implemented/recommended environmental changes (remove hazards, lower bed, improve lighting, etc.) and Implemented/recommended increased supervision/assistance    INTERDISPLINARY COMMUNICATION/COLLABORATION:  Physician, MSW, [place] and RN, CNA    NEW MEDICATION INITIATION DOCUMENTATION:  No new meds initiated. Reason medication is being initiated:  n/a    MD / Provider name consulted re: change in status / initiation of new medication:  n/a    New Symptom(s):  n/a    New Order(s):  n/a    Name of the person notified of the changes:  n/a    Name of person being taught:  n/a    Instructions given:  n/a    Side Effects taught:  n/a    Response to teaching:  n/a      COMFORTABLE DYING MEASURE:  Is Patient/family satisfied with symptom level?  yes    DISCHARGE PLAN:  Pt will complete her Respite stay at the [facility] and then return home and continue to be followed by Home Hospice.

## 2022-05-10 NOTE — PROGRESS NOTES
Problem: Pressure Injury - Risk of  Goal: *Prevention of pressure injury  Description: Document Anderson Scale and appropriate interventions in the flowsheet. Outcome: Progressing Towards Goal  Note: Pressure Injury Interventions:  Sensory Interventions: Keep linens dry and wrinkle-free,Minimize linen layers    Moisture Interventions: Apply protective barrier, creams and emollients,Limit adult briefs,Maintain skin hydration (lotion/cream),Minimize layers,Moisture barrier    Activity Interventions: Increase time out of bed,Pressure redistribution bed/mattress(bed type)    Mobility Interventions: HOB 30 degrees or less,Pressure redistribution bed/mattress (bed type)    Nutrition Interventions: Document food/fluid/supplement intake    Friction and Shear Interventions: Apply protective barrier, creams and emollients,HOB 30 degrees or less,Minimize layers                Problem: Patient Education: Go to Patient Education Activity  Goal: Patient/Family Education  Outcome: Progressing Towards Goal     Problem: Risk for Falls  Goal: Free of falls during inpatient stay  Description: Patient will be free of falls during inpatient stay. Outcome: Progressing Towards Goal     Problem: Pain  Goal: Assess satisfaction of level of comfort and symptom control  Outcome: Progressing Towards Goal     Problem: Anxiety/Agitation  Goal: Verbalize or staff assess the ability to manage anxiety  Description: The patient/family/caregiver will verbalize and demonstrate ability to manage the patient's anxiety throughout hospice care. Outcome: Progressing Towards Goal     Problem: Falls - Risk of  Goal: *Absence of Falls  Description: Document Verneice Frock Fall Risk and appropriate interventions in the flowsheet.   Outcome: Progressing Towards Goal  Note: Fall Risk Interventions:       Mentation Interventions: Adequate sleep, hydration, pain control,Door open when patient unattended,More frequent rounding    Medication Interventions: Bed/chair exit alarm,Evaluate medications/consider consulting pharmacy    Elimination Interventions: Call light in reach,Toilet paper/wipes in reach,Toileting schedule/hourly rounds    History of Falls Interventions: Bed/chair exit alarm,Door open when patient unattended         Problem: Patient Education: Go to Patient Education Activity  Goal: Patient/Family Education  Outcome: Progressing Towards Goal

## 2022-05-10 NOTE — HOSPICE
LCSW visited pt and pts grandmother for a routine visit while pt was in respite at [facility]. Pt was lethargic and slept 100 % of visit. Pts grandmother stated she noticed increased confusion and decline in pt. LCSW provided emotional support to pts grandmother who is still grieving the loss of her daughter (pts mother) 6 yrs ago. Pts grandmother reflected on this loss with LCSW. LCSW provided supportive counseling. Pts granddaughter stated she feels like it was history repeating itself as she was not at her granddaughter bedside waiting. Pts grandmother stated she has a strong harris and a good support system. LCSW reminded pts granddaughter of counseling services availabe through hospice and pts grandmother was appreciative. Pts grandmother is accepting of pt being discharged home and stated she does have some family (daughters) that could help with CG. LCSW discussed CG resources but pts grandmother stated she wanted to take it one day at a time. LCSW encouraged pts grandmother to call LCSW as her primary SW is OFF this week. LCSW will check on pt and pts granddaughter later this week to assess support and resource needs. LCSW will contnue to monitor and assess needs.

## 2022-05-10 NOTE — HOSPICE
Problem is grief related to Ms. Contreras's loss of independence and belief of that the future will be lonely. Goal is to remind Ms. Contreras of her value and worith as a person and child of God. The hospice chaplain met with Ms. Pepe Wiley at the [facility] where she was receiving respite care. Ms. Stef Mallory affect seemed flat. Moreover, she discussed feeling discouraged and sad about her circustamces. Ms. Pepe Wiley appeared to struggle in identifying coping skills that might help reduce her sense of despair. However, Ms. Pepe Wiley was receptive to continuing an ongoing conversation with a focus on identifying joyful moments in her life. The hospice chaplain encouraged Ms. Contreras to be surprised by nathan in everyday life. The hospice chaplain reminded Ms. Pepe Wiley of her family and friends. The hospice chaplain offered a prayer that focused on being surprised by nathan each day. Ms. Pepe Wiley responded with a smile and then agreed to another visit. The plan for the next two weeks is to respond to Ms. Contreras's request for spiritual support.

## 2022-05-10 NOTE — HOSPICE
300 RESPACE Worker Note:         LCSW visited pt and pts grandmother for a routine visit while pt was in respite at [facility]. Pt was lethargic and slept 100 % of visit. Pts grandmother stated she noticed increased confusion and decline in pt. LCSW provided emotional support to pts grandmother who is still grieving the loss of her daughter (pts mother) 6 yrs ago. Pts grandmother reflected on this loss with LCSW. LCSW provided supportive counseling. Pts granddaughter stated she feels like it was history repeating itself as she was not at her granddaughter bedside waiting. Pts grandmother stated she has a strong harris and a good support system. LCSW reminded pts granddaughter of counseling services availabe through hospice and pts grandmother was appreciative. Pts grandmother is accepting of pt being discharged home and stated she does have some family (daughters) that could help with CG. LCSW discussed CG resources but pts grandmother stated she wanted to take it one day at a time. LCSW encouraged pts grandmother to call LCSW as her primary SW is OFF this week. LCSW will check on pt and pts granddaughter later this week to assess support and resource needs. LCSW will contnue to monitor and assess needs.        Dorie Cheung LCSW  Clinical Supervisor of Psychosocial Services  [facility]

## 2022-05-10 NOTE — PROGRESS NOTES
[address] [facility] Help to Those in Need  [phone]    Patient Name: Cari Fagan  YOB: 1980    Date of Provider Hospice Visit: 05/10/22    Level of Care:   [] General Inpatient (GIP)    [] Routine   [x] Respite    Current Location of Care:  [] [facility] [] [facility] [] [facility] [] [facility] [x] Hospice House [facility]      Principle Hospice Diagnosis: Glioblastoma    Chart reviewed especially note from Lisa Triplett at .'s note on 5/6 states patient has been maybe little bit more depressedanniversary of her mother's passing. She definitely is eating less and her mentation seems to wax and wane. During my exam on 5/7, patient definitely appears thinner, struggling a little more with expressive aphasia and also a little more confused on where she was and why she was here. She still was able to eat and remains with left-sided weakness. 5/9; pt seen in rounds. Appears comfortable, sitting up and eating a burger for lunch. Her grandmother is at bedside. Pt smiling and denies any symptoms at this time. Does have expressive aphasia and slow delayed responses. Had episode of Sz yesterday and was postictal but now back to baseline almost. Intermittently she does c/o back pain and generalised body ache. She is on oxycodone IR 10mg prn that she is using about 4 doses daily. Got 1 prn dose of s/l morphine 10mg as well. Pt is on several medications orally; increased pill burden; she wishes to reduce the no of pills she has to take and is interested in us looking at med list and making suggestions to stop. 5/10; pt seen in IDG rounds; sitting up in bed, appears spaced out, per nursing staff she has had increased confusion, lethargy, having difficulty getting words out, also likely with vision deficits and not able to track or see properly in all surrounding fields, appears more pale, tired, flat affect, spoke to nurse staff about dying.  Seems depressed, some difficulty swallowing especially if given multiple pills at same time, able to swallow few pills 2-3 at a time. Denies pain. Becomes anxious and short of breath intermittently       HOSPICE SUMMARY     Chart Reviewed for patient's medical history and hospice care plan. Hospice Physician Certification/Recertification Narrative per Gideon Kyle / anurag MD:     Patient remains on service secondary to glioblastoma. She also has a history of seizures. She had a face-to-face visit done on 8/78 for recertification. Patient recertified if she is showing increasing weakness, not able to ambulate, increasing forgetfulness/confusion. Continues have evidence of some expressive aphasia. Patient certainly appropriate for recertification given her advanced glioblastoma. Patient sent to the hospice house secondary to caregiver exhaustion but also patient appears to be having more symptoms. General awake, able to comprehend and answer simple questions   HEENT moist oral mucosa  Lungsclear to auscultation  Cardiovascularregular rate and rhythm  Abdomensoft  Neuroleft-sided paresis, expressive aphasia    Patient being admitted for Respite care x 5 days for   [x]  Caregiver exhaustion and needing break  []  Caregiver unavailable    Hospice Symptoms  - Cancer related pain  - Neuropathic pain  - Seizures  - Fatigue/weakness  - Confusion  - Anxiety/restlessness/depression  - Shortness of breath  - Hospice care       PLAN   1. Continue respite care for now. Pt scheduled to go home tomorrow. 2. Pt's BP is fairly well controlled and HR in 80's. She is starting to have difficulty with swallowing pills now so will try reduce pill burden further; d/c lisinopril and continue metoprolol, keep track of HR/BP  3. Change scheduled Ambien at bedtime to as needed  4. D/c morphine; pt has significant abnormal LFT's and morphine not best choice.  Will continue on oxycodone IR 10mg every 2 hours as needed for now, if later she needs something stronger then can consider dilaudid for breakthrough pain control  5. Continue scheduled Oxycodone ER 20mg every 12 hours. 6. Continue Lyrica 50mg three times daily for chronic neuropathic adjuvant pain control/antiseizure  7. [address] has been increased to 750mg twice daily and vimpat to be continued as well for seizures management  8. Continue other symptoms meds as needed  9. Pt seems to be declining overall and likely has a short prognosis but wishes to go home that is scheduled for tomorrow    10.  and SW to support family needs. 11. These medications have been stopped; home meds list to be updated accordingly: Lisinopril, Zetia, glucotrol, metformin, gabapentin, morphine         MD Addendum: 3.45pm    Called by Ms. Noe Wilhelm RN that pt is not doing well. Having severe pain/headache; pressure radiating to ears, pt restless, confused, crying and saying she is ready to die. Noe Wilhelm has medicated pt with oxycodone that she took with much difficulty swallowing but it is still not working well and pain not alleviated. I went down to see patient and assess her again. Pt's grandmother in room. By this time pt's headache has eased up a little but she still appeared very tired, slow and confused. Able to answer simple questions but slow to respond and also answers fluctuate. Pt really has not been very comfortable past several days and has had multiple medication changes, adjustments, has not slept well, drowsy during daytimes, having breakthrough episodes of pain that is intense and gripping when she has it and takes long to subside, pt having mood fluctuations; anxious, depressed, crying, smiling. Increased difficulty with swallowing mainly pills; very slow process and able to swallow 1-2 pills at a time. I offered pt soda from straw cup and she held on to the drink in her mouth for atleast 30-40secs ? Not knowing what to do versus having difficulty swallwoing. Pt not eating much either; nibbling on food and eating chips.  Having trouble with swallowing food/drinks as well now. Pt already has had 2 episodes of Sz this past week despite multiple seizure medications. Pt does not like taste of liquid keppra and other meds and having difficulty with it too. Pt also c/o weird loss of sensations in her ears especially right side, and vision deficits. Overall seems like disease progression and pt getting close to end. Spoke to grandmother and pt in length in room in presence of Ms. Loraine Galindo and Ms Silvestre Nelson. Spoke to Andrea RAMÍREZ MsInez Tay Bianca by phone and provided medical update.    At this time we are deciding to cancel pt's discharge home tomorrow     Plan  - cancel d/c home  - Change LOC to GIP  - d/c all oral medications  - Access port for IV meds  - Dilaudid 0.5mg IV scheduled every 4 hrs and 2mg IV every 15mts as needed  - Ativan 2mg IV scheduled every 4 hours and every 15mts as needed  - Ativan 4mg IV q5mts x 3 doses for active seizures  - Decadron 4mg IV daily  - Assess and monitor response

## 2022-05-10 NOTE — PROGRESS NOTES
Problem: Pressure Injury - Risk of  Goal: *Prevention of pressure injury  Description: Document Anderson Scale and appropriate interventions in the flowsheet. Outcome: Progressing Towards Goal  Note: Pressure Injury Interventions:  Sensory Interventions: Assess changes in LOC    Moisture Interventions: Assess need for specialty bed    Activity Interventions: Increase time out of bed    Mobility Interventions: HOB 30 degrees or less    Nutrition Interventions: Document food/fluid/supplement intake    Friction and Shear Interventions: Apply protective barrier, creams and emollients                Problem: Patient Education: Go to Patient Education Activity  Goal: Patient/Family Education  Outcome: Progressing Towards Goal     Problem: Risk for Falls  Goal: Free of falls during inpatient stay  Description: Patient will be free of falls during inpatient stay. Outcome: Progressing Towards Goal     Problem: Pain  Goal: Assess satisfaction of level of comfort and symptom control  Outcome: Progressing Towards Goal     Problem: Anxiety/Agitation  Goal: Verbalize or staff assess the ability to manage anxiety  Description: The patient/family/caregiver will verbalize and demonstrate ability to manage the patient's anxiety throughout hospice care. Outcome: Progressing Towards Goal     Problem: Falls - Risk of  Goal: *Absence of Falls  Description: Document Javy Carohmann Fall Risk and appropriate interventions in the flowsheet.   Outcome: Progressing Towards Goal  Note: Fall Risk Interventions:       Mentation Interventions: Bed/chair exit alarm    Medication Interventions: Bed/chair exit alarm    Elimination Interventions: Call light in reach    History of Falls Interventions: Bed/chair exit alarm         Problem: Patient Education: Go to Patient Education Activity  Goal: Patient/Family Education  Outcome: Progressing Towards Goal

## 2022-05-10 NOTE — PROGRESS NOTES
NAME OF PATIENT:  Eris Christianson    LEVEL OF CARE:  Respite    REASON FOR GIP:   N/A    *PATIENT REMAINS ELIGIBLE FOR GIP LEVEL OF CARE AS EVIDENCED BY: (MUST BE ADDRESSED OF PATIENT GIP)      REASON FOR RESPITE:  Caregiver breakdown    O2 SAFETY:  N/A    FALL INTERVENTIONS PROVIDED:   Implemented/recommended use of non-skid footwear, Implemented/recommended use of fall risk identification flag to all team members, Implemented/recommended assistive devices and encouraged their use, Implemented/recommended resources for alarm system (personal alarm, bed alarm, call bell, etc.) , Implemented/recommended environmental changes (remove hazards, lower bed, improve lighting, etc.) and Implemented/recommended increased supervision/assistance    INTERDISPLINARY COMMUNICATION/COLLABORATION:  Physician, MSW, [place] and RN, CNA    NEW MEDICATION INITIATION DOCUMENTATION:  N/A    Reason medication is being initiated:  N/A    MD / Provider name consulted re: change in status / initiation of new medication:  N/A    New Symptom(s):  N/A    New Order(s):  N/A    Name of the person notified of the changes:  N/A    Name of person being taught:  N/A    Instructions given:  N/A    Side Effects taught:  N/A    Response to teaching:  N/A      COMFORTABLE DYING MEASURE:  Is Patient/family satisfied with symptom level?  yes    DISCHARGE PLAN: Home      SHIFT REPORT  1900-Handoff report received from [facility]. Respite level of care with an admitting diagnosis of glioblastoma. 1930-Patient resting quietly in upright position in bed with eyes closed    Neuro=A&Ox4  Cardio=WNL  Resp=RA  GI=Regular diet  =Voiding/Brief  Skin=Intact, old scars and bruising BUE/BLE  Access=None  SEIZURE PRECAUTIONS    2100-Scheduled medications administered. Patient requested a pepsi and ice water. 2000 -Patient resting quietly in bed with eyes closed. 2200-Patient resting quietly in bed with eyed closed.      0000-PRN pericolace given to assist with producing a BM. Saturated brief changed. 0120-Patient verbalized that she needed something to help her die. This RN asked patient her thoughts about how she felt, she verbalized that she just wanted everything to stop. 0130-PRN Ativan 1mg administered. 0145-Patient calling out for help, verbalizing that she was having some SOB. PRN Morphine was administered. 0200-Patient presents more with not wanting to progress forward. Making comments wanting  everything to stop. Previous sublingual meds given not showing beneficial effects. Status change (GIP) may need to be considered/evaluated to adjust to IV meds and possibly access current chest port. 0230-Contacted on-call NP to request an adjustment to to current meds due to changes in patient behavior being more restless and wanting to       0250-PRN Morphine 15mg administered    0300-Patient mentioned that she would like to call her grandmother and aunt. Suggested to call them after breakfast when they would be awake. This RN encouraged patient to try to rest at this time and call her family in the morning. 0315-Changed saturated brief; no BM yet. 0400- Currently playing a game on her phone while sitting upright in bed.     0500-Patient appears to be more calm and is currently dozing off.     0600-Changed wet brief and assisted patient with ordering breakfast.     0700-Handoff report given to oncoming nurse.

## 2022-05-10 NOTE — PROGRESS NOTES
0700  Report received. 0730  Pt sitting up in bed, alert but pleasantly confused. Speech delayed,  Thought process delayed. Sight appears to be different today than yest.  Pt unable to tell the time from the clock on the wall. Lungs are clear. Pt is on RA. No cough noted.  + bowel sounds. Abd is soft, nontender. Last reported BM was 5-6. Pt is incontinent of urine. Skin is intact with scattered dark areas on her arms and legs. Occasional scattered raised rash on body. Pt is flaccid on her L side. 0830  Pt tolerated 100% of her breakfast.   0930  Pt tolerated her meds. Pt unable to take all pills at one time. Rn gave pt some chex mix. Pt tried to swallow it like a pill. 1000  Dr Zenaida Davis rounding. IDG rounding. Pt having difficulty getting her words out. Difficult. No changes in POC. No co pain. Pt states she can not feel her eats   1100  Sar[place] Placed, large amount of clear yellow urine out. Pt asleep after turning and repositioning. 1152  Pt's Grandmother at the bedside. GrandMother surprised at the changes since yest.  (increased confusion, lethargy, difficulty getting words out.)  Etc. Rn educated her on the changes in pt's status and medication changes. Grandmother very appreciative of care and support. 1230  Phone call to [address] to update him on pt's changes. Pt asleep. 1300  Pt sitting up in bed, eating lunch. Grandmother at the bedside . 1340  Call bell going off. Pt stated she just doesn't know what to do. Pt holding the R side of her face and hear. Pt states he eat feels funny. Pt stated she feels a pressure on her head. 1358  Pt medicated with Lorazepam and Oxycodone IR. Rn encouraged her to rest.    [phone]  Pt stated she was tired. Pt said I just want this pain to stop. I cant talk right, I cant see things and my ears feel funny. Pt GrandMother at the bedside telling Kath Dominguez that the only way to make this better is to stop breathing.   And if you want to stop breathing that's ok. She told eJ Carrasquillo that she was going to miss her by she also wanted the pain and suffering to stop. 1500  Pt attempting to eat a piece of pizza. Pt took the slice of pizza to her Mouth an hit above her lip. 0  Dr Varsha Alvarez in to visit. Elin Cherise at the Bedside. Dr Varsha Alvarez discussed POC with Je Carrasquillo and her Grandmother. LOC changed to GIP for symptom management of uncontrolled pain, terminal agitation and management of seizures. 1545  Phone call to Aunt (CG) from Dr Varsha Alvarez to discuss changes in [address]  All in agreement to keep Pt here and use her PAC for IV meds for comfort. [address] advised Yumi Jovel would not be discharged tomorrow. 1630  Pt sitting up in bed, Right eye closed. R Chest PAC accessed with good blood return. 1638  Pt medicated with scheduled IV meds. Pt sitting up in bed, CNA NV feeding pt. Pt stated she could not rest.  R leg hanging off the bed.    1700  Pt asleep. No grimacing. Brows not furrowed. 1800  Pt snoring. 1830  Pt bathed, turned and repositioned. 1845  Pt snoring. 1900  Report given. Changes passed on to next shift:  Pt swallowed all pills at one time yest.  Having difficulty swallowing 1 at a time today. Holding liquids in her Mouth, Reporting she can not feel her ears. Feeling like pressure on her R side of her face. Pt having difficulty seeing. Increased delayed thought process.       NAME OF PATIENT:  Cari Fagan     LEVEL OF CARE:  Respite     REASON FOR GIP:  n/a     *PATIENT REMAINS ELIGIBLE FOR GIP LEVEL OF CARE AS EVIDENCED BY: (MUST BE ADDRESSED OF PATIENT GIP)  n/a        REASON FOR RESPITE:  Caregiver cannot care for patient due to:  exhaustion     O2 SAFETY:  RA     FALL INTERVENTIONS PROVIDED:   Implemented/recommended use of non-skid footwear, Implemented/recommended use of fall risk identification flag to all team members, Implemented/recommended assistive devices and encouraged their use, Implemented/recommended resources for alarm system (personal alarm, bed alarm, call bell, etc.) , Implemented/recommended environmental changes (remove hazards, lower bed, improve lighting, etc.) and Implemented/recommended increased supervision/assistance     INTERDISPLINARY COMMUNICATION/COLLABORATION:  Physician, MSW, [place] and RN, CNA     NEW MEDICATION INITIATION DOCUMENTATION:  No new meds initiated.      Reason medication is being initiated:  n/a     MD / Provider name consulted re: change in status / initiation of new medication:  n/a     New Symptom(s):  n/a     New Order(s):  n/a     Name of the person notified of the changes:  n/a     Name of person being taught:  n/a     Instructions given:  n/a     Side Effects taught:  n/a     Response to teaching:  n/a        COMFORTABLE DYING MEASURE:  Is Patient/family satisfied with symptom level?  yes     DISCHARGE PLAN:  Pt will complete her Respite stay at the [facility] and then return home and continue to be followed by Home Hospice.

## 2022-05-10 NOTE — PROGRESS NOTES
Problem: Pressure Injury - Risk of  Goal: *Prevention of pressure injury  Description: Document Anderson Scale and appropriate interventions in the flowsheet. Outcome: Progressing Towards Goal  Note: Pressure Injury Interventions:  Sensory Interventions: Assess changes in LOC,Check visual cues for pain    Moisture Interventions: Assess need for specialty bed,Maintain skin hydration (lotion/cream),Limit adult briefs    Activity Interventions: Increase time out of bed    Mobility Interventions: HOB 30 degrees or less,Float heels    Nutrition Interventions: Document food/fluid/supplement intake,Offer support with meals,snacks and hydration    Friction and Shear Interventions: Apply protective barrier, creams and emollients,HOB 30 degrees or less,Lift sheet                Problem: Patient Education: Go to Patient Education Activity  Goal: Patient/Family Education  Outcome: Progressing Towards Goal     Problem: Risk for Falls  Goal: Free of falls during inpatient stay  Description: Patient will be free of falls during inpatient stay. Outcome: Progressing Towards Goal     Problem: Pain  Goal: Assess satisfaction of level of comfort and symptom control  Outcome: Progressing Towards Goal     Problem: Anxiety/Agitation  Goal: Verbalize or staff assess the ability to manage anxiety  Description: The patient/family/caregiver will verbalize and demonstrate ability to manage the patient's anxiety throughout hospice care. Outcome: Progressing Towards Goal     Problem: Falls - Risk of  Goal: *Absence of Falls  Description: Document Kamla Evelyn Fall Risk and appropriate interventions in the flowsheet.   Outcome: Progressing Towards Goal  Note: Fall Risk Interventions:       Mentation Interventions: Bed/chair exit alarm    Medication Interventions: Bed/chair exit alarm    Elimination Interventions: Call light in reach,Bed/chair exit alarm    History of Falls Interventions: Bed/chair exit alarm,Door open when patient unattended Problem: Patient Education: Go to Patient Education Activity  Goal: Patient/Family Education  Outcome: Progressing Towards Goal

## 2022-05-11 NOTE — PROGRESS NOTES
[facility] Hospice Monitoring   May 11, 2022    Pharmacist monitoring provided for this patient [facility]    Treatment Goal Check List     Admitting dianosis treated appropriately : Yes    Nausea/Vomiting controlled: Yes    Pain Controlled:  Yes    Agitation/Anxiety/ Delirium controlled: Yes    Depression controlled: Yes    Secretions controlled : Yes    Constipation/Bowel routine controlled: Yes    SOB/ Dyspnea controlled: Yes    Insomnia: Yes

## 2022-05-11 NOTE — HOSPICE
SW visit with pt at [facility] where she is at [facility] level of care. Pt appears to be transitioning towards end of life. MSW provided therapeutic touch and talk  to pt. No family/visitors present but MSW made phone contact with aunt, Tomi Gottron. Steffany Camacho reports that family is sad but aware and accepting that pt is nearing end of life. Emotional support provided as family has had some other deaths that they are still mourning. Steffany Camacho is requesting a call from the physician or RN after the physician rounds today for an update. This request was relayed to the [facility] RN. [address] identified for final arrangements.

## 2022-05-11 NOTE — PROGRESS NOTES
[facility]  [phone]    Patient Name: Zuleyka Ruiz  YOB: 1980    Date of Provider Hospice Visit: 05/11/22    Level of Care:   [x] General Inpatient (GIP)    [] Routine   [] Respite    Current Location of Care:  [] [facility] [place] [] [facility] [] [facility] [] [address] [x] Hospice House [facility]      Principle Hospice Diagnosis: Glioblastoma         HOSPICE SUMMARY     Chart Reviewed for patient's medical history and hospice care plan. Hospice Physician Certification/Recertification Narrative per Rob Uriarte / anurag MD:     Patient remains on service secondary to glioblastoma. She also has a history of seizures. She had a face-to-face visit done on 7/60 for recertification. Patient recertified if she is showing increasing weakness, not able to ambulate, increasing forgetfulness/confusion. Continues have evidence of some expressive aphasia. Patient certainly appropriate for recertification given her advanced glioblastoma. Patient sent to the hospice house secondary to caregiver exhaustion but also patient appears to be having more symptoms. Chart reviewed especially note from Karly Saravia at .'s note on 5/6 states patient has been maybe little bit more depressedanniversary of her mother's passing. She definitely is eating less and her mentation seems to wax and wane. During my exam on 5/7, patient definitely appears thinner, struggling a little more with expressive aphasia and also a little more confused on where she was and why she was here. She still was able to eat and remains with left-sided weakness. 5/9; pt seen in rounds. Appears comfortable, sitting up and eating a burger for lunch. Her grandmother is at bedside. Pt smiling and denies any symptoms at this time. Does have expressive aphasia and slow delayed responses.  Had episode of Sz yesterday and was postictal but now back to baseline almost. Intermittently she does c/o back pain and generalised body ache. She is on oxycodone IR 10mg prn that she is using about 4 doses daily. Got 1 prn dose of s/l morphine 10mg as well. Pt is on several medications orally; increased pill burden; she wishes to reduce the no of pills she has to take and is interested in us looking at med list and making suggestions to stop. 5/10; pt seen in IDG rounds; sitting up in bed, appears spaced out, per nursing staff she has had increased confusion, lethargy, having difficulty getting words out, also likely with vision deficits and not able to track or see properly in all surrounding fields, appears more pale, tired, flat affect, spoke to nurse staff about dying. Seems depressed, some difficulty swallowing especially if given multiple pills at same time, able to swallow few pills 2-3 at a time. Denies pain. Becomes anxious and short of breath intermittently    5/10 Addendum: 3.45pm    Called by Ms. Prakash ngo RN that pt is not doing well. Having severe pain/headache; pressure radiating to ears, pt restless, confused, crying and saying she is ready to die. Prakash perez huber has medicated pt with oxycodone that she took with much difficulty swallowing but it is still not working well and pain not alleviated. I went down to see patient and assess her again. Pt's grandmother in room. By this time pt's headache has eased up a little but she still appeared very tired, slow and confused. Able to answer simple questions but slow to respond and also answers fluctuate. Pt really has not been very comfortable past several days and has had multiple medication changes, adjustments, has not slept well, drowsy during daytimes, having breakthrough episodes of pain that is intense and gripping when she has it and takes long to subside, pt having mood fluctuations; anxious, depressed, crying, smiling. Increased difficulty with swallowing mainly pills; very slow process and able to swallow 1-2 pills at a time.  I offered pt soda from straw cup and she held on to the drink in her mouth for atleast 30-40secs ? Not knowing what to do versus having difficulty swallwoing. Pt not eating much either; nibbling on food and eating chips. Having trouble with swallowing food/drinks as well now. Pt already has had 2 episodes of Sz this past week despite multiple seizure medications. Pt does not like taste of liquid keppra and other meds and having difficulty with it too. Pt also c/o weird loss of sensations in her ears especially right side, and vision deficits. Overall seems like disease progression and pt getting close to end. Spoke to grandmother and pt in length in room in presence of Ms. Carl Abbot and Ms Jennifer Nevaeh. Spoke to Andrea RAMÍREZ Ms. Davila Grandchild by phone and provided medical update. At this time we are deciding to cancel pt's discharge home tomorrow       5/11: pt seen in the evening; was seen earlier in the day by NP Ms Nas Corcoran. Pt is unresponsive now mostly, continues with periods of increased restlessness, agitation, breathing distress and pain. Becomes visibly uncomfortable. No oral intake now  Pt had received several prn doses of dilaudid and ativan overnight. Dilaudid & Ativan were increased to 2mg IV scheduled every 4 hours. Exam:   General unresponsive, noisy loud breathing, somewhat restless, spontaneously moving arms and legs, appears in distress, facial frown+   HEENT dry oral mucosa  Lungs labored noisy breathing  Cardiovascularregular tachycardia  Abdomensoft  Neurounresponsive, restless, agitated      Hospice Symptoms  - Cancer related pain  - Seizures  - Fatigue/weakness  - Confusion/decreased responsiveness  - Anxiety/restlessness/agitation  - Shortness of breath  - Hospice care       PLAN   1. Pt is on GIP LOC as she is transitioning now, declined significantly, is symptomatic, needs close monitoring and aggressive management of symptoms. 2. Dilaudid IV increased to 4mg scheduled every 4 hours and every 15mts as needed  3.  Ativan IV 2mg scheduled every 4 hours and every 15mts as needed  4. Ativan 4mg IV q5mts x 3 doses for active seizures  5. Continue other symptoms meds as needed  6. Pt seems to be declining overall quickly and likely has a short prognosis. 7.  and SW to support family needs.

## 2022-05-11 NOTE — PROGRESS NOTES
Problem: Pressure Injury - Risk of  Goal: *Prevention of pressure injury  Description: Document Anderson Scale and appropriate interventions in the flowsheet. Outcome: Progressing Towards Goal  Note: Pressure Injury Interventions:  Sensory Interventions: Float heels,Minimize linen layers    Moisture Interventions: Internal/External urinary devices,Minimize layers,Apply protective barrier, creams and emollients,Moisture barrier    Activity Interventions: Increase time out of bed    Mobility Interventions: HOB 30 degrees or less    Nutrition Interventions: Document food/fluid/supplement intake    Friction and Shear Interventions: Apply protective barrier, creams and emollients                Problem: Patient Education: Go to Patient Education Activity  Goal: Patient/Family Education  Outcome: Progressing Towards Goal     Problem: Risk for Falls  Goal: Free of falls during inpatient stay  Description: Patient will be free of falls during inpatient stay. Outcome: Progressing Towards Goal     Problem: Pain  Goal: Assess satisfaction of level of comfort and symptom control  Outcome: Progressing Towards Goal     Problem: Anxiety/Agitation  Goal: Verbalize or staff assess the ability to manage anxiety  Description: The patient/family/caregiver will verbalize and demonstrate ability to manage the patient's anxiety throughout hospice care. Outcome: Progressing Towards Goal     Problem: Falls - Risk of  Goal: *Absence of Falls  Description: Document Maggie Golden Fall Risk and appropriate interventions in the flowsheet.   Outcome: Progressing Towards Goal  Note: Fall Risk Interventions:       Mentation Interventions: Bed/chair exit alarm    Medication Interventions: Bed/chair exit alarm    Elimination Interventions: Call light in reach    History of Falls Interventions: Bed/chair exit alarm         Problem: Patient Education: Go to Patient Education Activity  Goal: Patient/Family Education  Outcome: Progressing Towards Goal

## 2022-05-11 NOTE — PROGRESS NOTES
Problem: Pressure Injury - Risk of  Goal: *Prevention of pressure injury  Description: Document Anderson Scale and appropriate interventions in the flowsheet. 5/11/2022 0914 by Melba Snellen, RN  Outcome: Progressing Towards Goal  Note: Pressure Injury Interventions:  Sensory Interventions: Float heels,Minimize linen layers    Moisture Interventions: Internal/External urinary devices,Minimize layers,Apply protective barrier, creams and emollients,Moisture barrier    Activity Interventions: Increase time out of bed    Mobility Interventions: HOB 30 degrees or less    Nutrition Interventions: Document food/fluid/supplement intake    Friction and Shear Interventions: Apply protective barrier, creams and emollients             5/11/2022 0853 by Melba Snellen, RN  Outcome: Progressing Towards Goal  Note: Pressure Injury Interventions:  Sensory Interventions: Float heels,Minimize linen layers    Moisture Interventions: Internal/External urinary devices,Minimize layers,Apply protective barrier, creams and emollients,Moisture barrier    Activity Interventions: Increase time out of bed    Mobility Interventions: HOB 30 degrees or less    Nutrition Interventions: Document food/fluid/supplement intake    Friction and Shear Interventions: Apply protective barrier, creams and emollients                Problem: Patient Education: Go to Patient Education Activity  Goal: Patient/Family Education  5/11/2022 0914 by Melba Snellen, RN  Outcome: Progressing Towards Goal  5/11/2022 0853 by Melba Snellen, RN  Outcome: Progressing Towards Goal     Problem: Risk for Falls  Goal: Free of falls during inpatient stay  Description: Patient will be free of falls during inpatient stay.   5/11/2022 0914 by Melba Snellen, RN  Outcome: Progressing Towards Goal  5/11/2022 0853 by Melba Snellen, RN  Outcome: Progressing Towards Goal     Problem: Pain  Goal: Assess satisfaction of level of comfort and symptom control  5/11/2022 0914 by Melba Snellen, RN  Outcome: Progressing Towards Goal  5/11/2022 0853 by Dutch Gill RN  Outcome: Progressing Towards Goal     Problem: Anxiety/Agitation  Goal: Verbalize or staff assess the ability to manage anxiety  Description: The patient/family/caregiver will verbalize and demonstrate ability to manage the patient's anxiety throughout hospice care. 5/11/2022 0914 by Dutch Gill RN  Outcome: Progressing Towards Goal  5/11/2022 0853 by Dutch Gill RN  Outcome: Progressing Towards Goal     Problem: Falls - Risk of  Goal: *Absence of Falls  Description: Document Machelle Jaramillo Fall Risk and appropriate interventions in the flowsheet.   5/11/2022 0914 by Dutch Gill RN  Outcome: Progressing Towards Goal  Note: Fall Risk Interventions:       Mentation Interventions: Bed/chair exit alarm    Medication Interventions: Bed/chair exit alarm    Elimination Interventions: Call light in reach    History of Falls Interventions: Bed/chair exit alarm      5/11/2022 0853 by Dutch Gill RN  Outcome: Progressing Towards Goal  Note: Fall Risk Interventions:       Mentation Interventions: Bed/chair exit alarm    Medication Interventions: Bed/chair exit alarm    Elimination Interventions: Call light in reach    History of Falls Interventions: Bed/chair exit alarm         Problem: Patient Education: Go to Patient Education Activity  Goal: Patient/Family Education  5/11/2022 0914 by Dutch Gill RN  Outcome: Progressing Towards Goal  5/11/2022 0853 by Dutch Gill RN  Outcome: Progressing Towards Goal

## 2022-05-11 NOTE — PROGRESS NOTES
1900 Report received from [facility] Patient resting with eyes closed. 2000 Assessment and vitals completed. Patient unresponsive with neutral facial expression. Scheduled IV medications given. 2100 Patient resting with eyes closed and unlabored respirations. Bed alarm in place. 2200 Patient resting with eyes closed and shallow respirations. 2300 Patient resting with eyes closed and relaxed facial expression. 0000 Patient given scheduled IV medications. Patient is unresponsive with unlabored breathing. 0100 Patient resting with eyes closed and relaxed facial expression. 0200 Patient resting with eyes closed and unlabored respirations. 0300 Patient resting with eyes closed and no facial grimacing.     0400 Patient scheduled IV medication given. Patient resting with eyes closed. Mouth care completed. 0500 Patient resting with eyes closed and shallow respirations. 0600 Sarabia drained 800 mL. Patient is still unresponsive and appears to be resting comfortably. Patient has been unresponsive the whole shift for this RN.    1634 Patient now awake and agitated. Pulling at gown, facial grimace, and trying to get out of bed. PRN dilaudid and ativan given. 0492 Patient still very agitated, trying to get off gown and get out of bed. Patient also having facial grimacing. 2nd dose given of PRN dilaudid and ativan. 9080 Patient eyes closed now and appears to be calming down.     0700 Report to oncoming nurse. NAME OF PATIENT:  Jacy Allen    LEVEL OF CARE:  [facility]    REASON FOR GIP:   Terminal agitation, despite changes to medications, Medication adjustment that must be monitored 24/7 and Stabilizing treatment that cannot take place at home to manage pain and restlessness. *PATIENT REMAINS ELIGIBLE FOR GIP LEVEL OF CARE AS EVIDENCED BY: Frequent nursing assessment and IV medications needed to manage symptoms.        REASON FOR RESPITE:  n/a    O2 SAFETY:  n/a    FALL INTERVENTIONS PROVIDED:   Implemented/recommended use of non-skid footwear, Implemented/recommended use of fall risk identification flag to all team members, Implemented/recommended resources for alarm system (personal alarm, bed alarm, call bell, etc.) , Implemented/recommended environmental changes (remove hazards, lower bed, improve lighting, etc.) and Implemented/recommended increased supervision/assistance    INTERDISPLINARY COMMUNICATION/COLLABORATION:  Physician, MSW, Petty and RN, CNA    NEW MEDICATION INITIATION DOCUMENTATION:  n/a    Reason medication is being initiated:  n/a    MD / Provider name consulted re: change in status / initiation of new medication:  n/a    New Symptom(s):  n/a    New Order(s):  n/a    Name of the person notified of the changes:  n/a    Name of person being taught:  n/a    Instructions given:  n/a    Side Effects taught:  n/a    Response to teaching:  n/a      COMFORTABLE DYING MEASURE:  Is Patient/family satisfied with symptom level?  yes    DISCHARGE PLAN:  Home when symptoms are managed.

## 2022-05-11 NOTE — PROGRESS NOTES
[facility]  [phone]    Patient Name: Remington Turpin  YOB: 1980    Date of Provider Hospice Visit: 05/11/22    Level of Care:   [] General Inpatient (GIP)    [] Routine   [x] Respite    Current Location of Care:  [] [address] [place] [address] [] [facility] [] [facility] [] [facility] [x] Hospice [facility]      Principle Hospice Diagnosis: Glioblastoma    Chart reviewed especially note from Clement Lieberman at .'s note on 5/6 states patient has been maybe little bit more depressedanniversary of her mother's passing. She definitely is eating less and her mentation seems to wax and wane. During my exam on 5/7, patient definitely appears thinner, struggling a little more with expressive aphasia and also a little more confused on where she was and why she was here. She still was able to eat and remains with left-sided weakness. 5/9; pt seen in rounds. Appears comfortable, sitting up and eating a burger for lunch. Her grandmother is at bedside. Pt smiling and denies any symptoms at this time. Does have expressive aphasia and slow delayed responses. Had episode of Sz yesterday and was postictal but now back to baseline almost. Intermittently she does c/o back pain and generalised body ache. She is on oxycodone IR 10mg prn that she is using about 4 doses daily. Got 1 prn dose of s/l morphine 10mg as well. Pt is on several medications orally; increased pill burden; she wishes to reduce the no of pills she has to take and is interested in us looking at med list and making suggestions to stop. 5/10; pt seen in IDG rounds; sitting up in bed, appears spaced out, per nursing staff she has had increased confusion, lethargy, having difficulty getting words out, also likely with vision deficits and not able to track or see properly in all surrounding fields, appears more pale, tired, flat affect, spoke to nurse staff about dying.  Seems depressed, some difficulty swallowing especially if given multiple pills at same time, able to swallow few pills 2-3 at a time. Denies pain. Becomes anxious and short of breath intermittently    5/11 pt seen and examined during rounds, more comfortable at this time and she is minimally responsive. Medications for comfort are all given IV as she us not awake enough to swallow, she has had 3 prn doses of ativan and 3 prn doses of dilaudid. Continue GIP level of care as she continues to require frequent assessments, intervention and observation for symptom issues. Continue alls scheduled and as needed IV medications for comfort. HOSPICE SUMMARY     Chart Reviewed for patient's medical history and hospice care plan. Hospice Physician Certification/Recertification Narrative per Edin Garces / anurag MD:     Patient remains on service secondary to glioblastoma. She also has a history of seizures. She had a face-to-face visit done on 3/25 for recertification. Patient recertified if she is showing increasing weakness, not able to ambulate, increasing forgetfulness/confusion. Continues have evidence of some expressive aphasia. Patient certainly appropriate for recertification given her advanced glioblastoma. Patient sent to the hospice house secondary to caregiver exhaustion but also patient appears to be having more symptoms. General awake, able to comprehend and answer simple questions   HEENT moist oral mucosa  Lungsclear to auscultation  Cardiovascularregular rate and rhythm  Abdomensoft  Neuroleft-sided paresis, expressive aphasia    Patient being admitted for Respite care x 5 days for   [x]  Caregiver exhaustion and needing break  []  Caregiver unavailable    Hospice Symptoms  - Cancer related pain  - Neuropathic pain  - Seizures  - Fatigue/weakness  - Confusion  - Anxiety/restlessness/depression  - Shortness of breath  - Hospice care       PLAN   1. Continue respite care for now. Pt scheduled to go home tomorrow.    2. Pt's BP is fairly well controlled and HR in 80's. She is starting to have difficulty with swallowing pills now so will try reduce pill burden further; d/c lisinopril and continue metoprolol, keep track of HR/BP  3. Change scheduled Ambien at bedtime to as needed  4. D/c morphine; pt has significant abnormal LFT's and morphine not best choice. Will continue on oxycodone IR 10mg every 2 hours as needed for now, if later she needs something stronger then can consider dilaudid for breakthrough pain control  5. Continue scheduled Oxycodone ER 20mg every 12 hours. 6. Continue Lyrica 50mg three times daily for chronic neuropathic adjuvant pain control/antiseizure  7. Cleave Gent has been increased to 750mg twice daily and vimpat to be continued as well for seizures management  8. Continue other symptoms meds as needed  9. Pt seems to be declining overall and likely has a short prognosis but wishes to go home that is scheduled for tomorrow    10.  and SW to support family needs. 11. These medications have been stopped; home meds list to be updated accordingly: Lisinopril, Zetia, glucotrol, metformin, gabapentin, morphine         MD Addendum: 3.45pm on 5/10/2022    Called by Ms. Kim Curran RN that pt is not doing well. Having severe pain/headache; pressure radiating to ears, pt restless, confused, crying and saying she is ready to die. Kim Curran has medicated pt with oxycodone that she took with much difficulty swallowing but it is still not working well and pain not alleviated. I went down to see patient and assess her again. Pt's grandmother in room. By this time pt's headache has eased up a little but she still appeared very tired, slow and confused. Able to answer simple questions but slow to respond and also answers fluctuate.  Pt really has not been very comfortable past several days and has had multiple medication changes, adjustments, has not slept well, drowsy during daytimes, having breakthrough episodes of pain that is intense and gripping when she has it and takes long to subside, pt having mood fluctuations; anxious, depressed, crying, smiling. Increased difficulty with swallowing mainly pills; very slow process and able to swallow 1-2 pills at a time. I offered pt soda from straw cup and she held on to the drink in her mouth for atleast 30-40secs ? Not knowing what to do versus having difficulty swallwoing. Pt not eating much either; nibbling on food and eating chips. Having trouble with swallowing food/drinks as well now. Pt already has had 2 episodes of Sz this past week despite multiple seizure medications. Pt does not like taste of liquid keppra and other meds and having difficulty with it too. Pt also c/o weird loss of sensations in her ears especially right side, and vision deficits. Overall seems like disease progression and pt getting close to end. Spoke to grandmother and pt in length in room in presence of Ms. Shekhar Fagan and Ms Zenaida Lopez. Spoke to Andrea Galarza by phone and provided medical update.    At this time we are deciding to cancel pt's discharge home tomorrow     Plan  - cancel d/c home  - Change LOC to GIP  - d/c all oral medications  - Access port for IV meds  - Dilaudid 0.5mg IV scheduled every 4 hrs and 2mg IV every 15mts as needed  - Ativan 2mg IV scheduled every 4 hours and every 15mts as needed  - Ativan 4mg IV q5mts x 3 doses for active seizures  - Decadron 4mg IV daily  - Assess and monitor response        Li Landa NP

## 2022-05-11 NOTE — HOSPICE
NAME OF PATIENT:  Beverely Riedel    LEVEL OF CARE:  GIP    REASON FOR GIP:   Pain, despite numerous changes in medications, Unmanageable respiratory distress, Terminal agitation, despite changes to medications, Medication adjustment that must be monitored 24/7 and Stabilizing treatment that cannot take place at home    *PATIENT REMAINS ELIGIBLE FOR [facility] LEVEL OF CARE AS EVIDENCED BY: (MUST BE ADDRESSED OF PATIENT GIP)       n/a    O2 SAFETY:     n/a  FALL INTERVENTIONS PROVIDED:   Implemented/recommended resources for alarm system (personal alarm, bed alarm, call bell, etc.)  and Implemented/recommended environmental changes (remove hazards, lower bed, improve lighting, etc.)    INTERDISPLINARY COMMUNICATION/COLLABORATION:  Physician and RN, CNA    NEW MEDICATION INITIATION DOCUMENTATION:      n/a    Reason medication is being initiated:     n/a    MD / Provider name consulted re: change in status / initiation of new medication:    n/a    New Symptom(s):    n/a    New Order(s):    n/a    Name of the person notified of the changes:    n/a    Name of person being taught:    n/a    Instructions given:    n/a    Side Effects taught:    n/a    Response to teaching:    n/a      COMFORTABLE DYING MEASURE:  Is Patient/family satisfied with symptom level?  yes    DISCHARGE PLAN:  Patient may pass at [facility], should patient stabilize will return home with family. 0700  Report received from Dina [address]  Patient resting with eyes closed, appears very pale. respirations shallow and irregular. 8983  Medicated with scheduled dilaudid, ativan and decadron. Oral care provided. 0825  Patient resting quietly. Patient appears more dusky/ pale around her mouth and nose. 0900  Patient having increased dyspnea and tugging respirations. 4602  Medicated with prn ativan and dilaudid. 3666  Respirations continue to be very irregular. Extended labored expirations continue.   1010  Turned patient onto her right side and positioned for comfort. Patient tolerated activity with moderate discomfort. 1015  Bedside rounds with Jj Kaur NP, medication adjustment made for improved dyspnea and pain management. [facility] with patient's aunt Vesta via phone. Update provided. 1115  Resting quietly with eyes closed. Respirations shallow, 4-10 per minute   1200  Medicated with scheduled ativan and dilaudid. Oral care provided. 1230  Patient grandmother visiting bedside. Update provided  1320  Patent resting quietly. [address] at bedside. Patient continues to rest quietly. 1445  Gentle oral care provided. 1500  Patient becoming more restless, agitated. Pulling on bed. Medicated with prn ativan and dilaudid. Grandmother at bedside. 1515  No relief, continues to be restless and agitated. Medicated with prn dilaudid and ativan. Family remains at bedside. 1540  Patient resting quietly, eyes closed. Grandmother took patient's clothes/ glasses. Patient's aunt Vesta took her cell phone home earlier today. 1610  Medicated with scheduled ativan and dilaudid. Patient resting quietly, appears more comfortable at this time. 1645  Patient resting quietly, with 10-15 second periods of apnea. [facility]  Dr. Elli Macedo rounding, medications adjusted for management of restlessness and discomfort. 1815  Patient resting quietly. Oral care provided. 1900  Report given to oncoming nurse.

## 2022-05-12 NOTE — PROGRESS NOTES
Problem: Pressure Injury - Risk of  Goal: *Prevention of pressure injury  Description: Document Anderson Scale and appropriate interventions in the flowsheet. 5/12/2022 1824 by Tomasa Bean RN  Outcome: Resolved/Met  Note: Pressure Injury Interventions:  Sensory Interventions: Chair cushion,Float heels,Minimize linen layers,Turn and reposition approx. every two hours (pillows and wedges if needed),Check visual cues for pain,Keep linens dry and wrinkle-free    Moisture Interventions: Absorbent underpads,Minimize layers,Internal/External urinary devices,Limit adult briefs,Moisture barrier    Activity Interventions: Assess need for specialty bed    Mobility Interventions: Assess need for specialty bed,Turn and reposition approx. every two hours(pillow and wedges),Float heels    Nutrition Interventions: Document food/fluid/supplement intake    Friction and Shear Interventions: Apply protective barrier, creams and emollients,Minimize layers             5/12/2022 0827 by Tomasa Bean RN  Outcome: Progressing Towards Goal  Note: Pressure Injury Interventions:  Sensory Interventions: Float heels,Minimize linen layers    Moisture Interventions: Internal/External urinary devices,Minimize layers,Apply protective barrier, creams and emollients,Moisture barrier    Activity Interventions: Increase time out of bed    Mobility Interventions: HOB 30 degrees or less    Nutrition Interventions: Document food/fluid/supplement intake    Friction and Shear Interventions: Apply protective barrier, creams and emollients                Problem: Patient Education: Go to Patient Education Activity  Goal: Patient/Family Education  5/12/2022 1824 by Tomasa Bean RN  Outcome: Resolved/Met  5/12/2022 0827 by Tomasa Bean RN  Outcome: Progressing Towards Goal     Problem: Risk for Falls  Goal: Free of falls during inpatient stay  Description: Patient will be free of falls during inpatient stay.   5/12/2022 1824 by Tomasa Bean RN  Outcome: Resolved/Met  5/12/2022 0827 by Mckinley Masterson RN  Outcome: Progressing Towards Goal     Problem: Pain  Goal: Assess satisfaction of level of comfort and symptom control  5/12/2022 1824 by Mckinley Masterson RN  Outcome: Resolved/Met  5/12/2022 0827 by Mckinley Masterson RN  Outcome: Progressing Towards Goal     Problem: Anxiety/Agitation  Goal: Verbalize or staff assess the ability to manage anxiety  Description: The patient/family/caregiver will verbalize and demonstrate ability to manage the patient's anxiety throughout hospice care. 5/12/2022 1824 by Mckinley Masterson RN  Outcome: Resolved/Met  5/12/2022 0827 by Mckinley Masterson RN  Outcome: Progressing Towards Goal     Problem: Anxiety/Agitation  Goal: Verbalize or staff assess the ability to manage anxiety  Description: The patient/family/caregiver will verbalize and demonstrate ability to manage the patient's anxiety throughout hospice care. 5/12/2022 1824 by Mckinley Masterson RN  Outcome: Resolved/Met  5/12/2022 0827 by Mckinley Masterson RN  Outcome: Progressing Towards Goal     Problem: Falls - Risk of  Goal: *Absence of Falls  Description: Document Lola Robert Fall Risk and appropriate interventions in the flowsheet.   5/12/2022 1824 by Mckinley Masterson RN  Outcome: Resolved/Met  Note: Fall Risk Interventions:       Mentation Interventions: Adequate sleep, hydration, pain control,Bed/chair exit alarm    Medication Interventions: Bed/chair exit alarm    Elimination Interventions: Call light in reach,Bed/chair exit alarm    History of Falls Interventions: Bed/chair exit alarm      5/12/2022 0827 by Mckinley Masterson RN  Outcome: Progressing Towards Goal  Note: Fall Risk Interventions:       Mentation Interventions: Bed/chair exit alarm    Medication Interventions: Bed/chair exit alarm    Elimination Interventions: Call light in reach    History of Falls Interventions: Bed/chair exit alarm         Problem: Patient Education: Go to Patient Education Activity  Goal: Patient/Family Education  5/12/2022 1824 by Tomasa Bean, RN  Outcome: Resolved/Met  5/12/2022 0842 by Tomasa Bean, RN  Outcome: Progressing Towards Goal

## 2022-05-12 NOTE — PROGRESS NOTES
1555: Called to patient's room by her grandmother with reports that she appears to have stopped breathing. After assessment, patient found to be without signs of life.    Time of Death 5/12/22 at

## 2022-05-12 NOTE — HOSPICE
The home assigned hospice chaplain visited Ms. Lenore Grace at the [facility] where she was receiving GIP care. Ms. Lenore Grace was minimally responsive. She didn't wake when the hospice chaplain spoke to her. The hospice chaplain observed that Ms. Lenore Grace didn't appear to be in any physical or emotional distress. The hospice chaplain offered a spoken prayer asking God for peace for Ms. Lenore Grace and her family. The plan is to coordinate care with the hospice house staff. The hospice chaplain will call the family to offer support.

## 2022-05-12 NOTE — HOSPICE
[facility] Worker Note:    11:00 AM- LCSW visited pts grandmother and pt. Pt appeared to be in the active stage of dying. Pt had no reports of pain but had increased secretions. LCSW provided support to pts grandmother who just arrived. Pts grandmother engaged in some life review. 1:00 PM- LCSW visited pts grandmother and pts best friend was at bedside. LCSW provided support. 4:30 PM- LCSW visited pt grandmother as pt just passed away. LCSW provided supportive counseling to pts grandmother. Pts aunt was on her way and would provide information on final arrangements. Pts grandmother discussed the loss of her daughter and LCSW provided support. LCSW reminded grandmother of counseling services available.     Donna Guerin LCSW

## 2022-05-12 NOTE — HOSPICE
0700 Report received from [facility].    5993 Patient noted to have restlessness and labored breathing, PRN IV dilaudid and PRN IV lorazepam, see MAR.    0750 Patient less restless and breathing is less labored at this time, PRN medications effective. Patient given scheduled IV medications, see Lacey Rosales MD notified of PRN usage/fever. PRN IV Toradol ordered and frequency adjusted for scheduled medications. 9832 Patient given PRN IV Toradol for fever, see MAR. Oral care provided, lip balm applied to lips. 3674 Dr. Enzo berumen, medications adjusted due to PRN usage and new symptoms of secretions. 5423 Patient noted to have audible secretions, PRN IV robinul given and scheduled IV lorazepam and dilaudid, see MAR. Patient repositioned for comfort. 4448 Patient noted to have labored breathing and restlessness PRN IV dilaudid and PRN IV lorazepam given, see MAR.    0950 Patient no longer with restlessness and labored breathing, PRN IV medications effective. 1035 Patient resting in bed quietly, eyes are closed, neutral facial expression noted, respirations are unlabored. 1105 Patient given scheduled IV medications, see MAR. Grandmother at the bedside, update given on patient's night/morning and medication adjustments. 1200 Patient resting in bed quietly, neutral facial expression noted, respirations are unlabored. 1245 Oral care provided.  Patient given scheduled IV medications and PRN IV robinul for secretions. 1330 Patient turned and repositioned in bed with YOLANDA Brito. Patient tolerated activity well. 1844 Dr. Enzo berumen, medications adjusted. 1455 Patient resting in bed quietly, eyes are closed, neutral facial expression noted. 1515 Patient given scheduled IV medications, see MAR.     1600 Patient passed away peacefully with grandmother at the bedside, patient pronounced by GEOVANNI Cabral at 1377.

## 2022-05-12 NOTE — PROGRESS NOTES
1900 Report received from GEOVANNI Brooke. Pt is GIP level of care for management of pain, restlessness and seizures. Dx Glioblastoma. 1930 Resting quietly in bed on left side with eyes closed. Appears to be sleeping. Visitors have come to bedside. They speak softly to pt and she does not arouse. They only stay a short time. 2030 Scheduled IV medication given. Pt repositioned to her right side. She becomes restless, trying to get up. Eyes open but does not focus or follow commands. 2130 Restless still at times. Has repositioned self back to left side and is now asleep. 2230 Resting with eyes closed. Appears to be sleeping. 2315 Restless in bed, pulling at side rails trying to sit up. Pulling off gown. PRN dose Dilaudid and Lorazepam given. 2340 Continues to be restless. PRN dose not effective. Scheduled IV dose given at this time. 2350 Pt resting quietly, no restlessness. Appears to be sleeping.  0100 Continues to rest quietly, appears to be sleeping.  0200 Assessed for discomfort. Resting quietly with eyes closed. 0300 Continues to rest quietly. 0345 Restless in bed, pulling off gown and covers. Scheduled IV medication given. Bath given with skin care. Repositioned in bed. Pt pulls self and turns to left side. 0500 Appears to be sleeping. No signs of discomfort.  0600 Resting quietly. 4282 Restless in bed, pulling at covers and gown. Pulling up with rails using right arm. Does not focus or follow commands. PRN dose Dilaudid 4 mg and Lorazepam 2 mg given IV.  0700 Resting quietly. Report to oncoming nurse.       NAME OF PATIENT:  Lorenda Marrow    LEVEL OF CARE:  [facility]    REASON FOR GIP:   Pain, despite numerous changes in medications, Terminal agitation, despite changes to medications, Medication adjustment that must be monitored 24/7 and Stabilizing treatment that cannot take place at home    *PATIENT REMAINS ELIGIBLE FOR [facility] LEVEL OF CARE AS EVIDENCED BY: Frequent nurse assessment and medication adjustments required. IV medications required. REASON FOR RESPITE:  na    O2 SAFETY:  na    FALL INTERVENTIONS PROVIDED:   Implemented/recommended use of fall risk identification flag to all team members, Implemented/recommended resources for alarm system (personal alarm, bed alarm, call bell, etc.) , Implemented/recommended environmental changes (remove hazards, lower bed, improve lighting, etc.) and Implemented/recommended increased supervision/assistance    INTERDISPLINARY COMMUNICATION/COLLABORATION:  Physician, MSW, [place] and RN, CNA    NEW MEDICATION INITIATION DOCUMENTATION:  na    Reason medication is being initiated:  No med changes required this shift    MD / Provider name consulted re: change in status / initiation of new medication:  na    New Symptom(s):  na    New Order(s):  na    Name of the person notified of the changes:  na    Name of person being taught:  na    Instructions given: na    Side Effects taught:  na    Response to teaching:  na      COMFORTABLE DYING MEASURE:  Is Patient/family satisfied with symptom level? Yes    DISCHARGE PLAN:   Remain GIP until symptoms are managed.

## 2022-05-12 NOTE — PROGRESS NOTES
Problem: Risk for Falls  Goal: Free of falls during inpatient stay  Description: Patient will be free of falls during inpatient stay.   Outcome: Progressing Towards Goal

## 2022-05-12 NOTE — PROGRESS NOTES
[facility]  [phone]    Patient Name: Latisha Silva  YOB: 1980    Date of Provider Hospice Visit: 05/12/22    Level of Care:   [x] General Inpatient (GIP)    [] Routine   [] Respite    Current Location of Care:  [] [facility] [place] [] [facility] [] [facility] [] [address] [x] Hospice House [facility]      Principle Hospice Diagnosis: Glioblastoma         HOSPICE SUMMARY     Chart Reviewed for patient's medical history and hospice care plan. Hospice Physician Certification/Recertification Narrative per Rubi Waters / anurag MD:     Patient remains on service secondary to glioblastoma. She also has a history of seizures. She had a face-to-face visit done on 7/66 for recertification. Patient recertified if she is showing increasing weakness, not able to ambulate, increasing forgetfulness/confusion. Continues have evidence of some expressive aphasia. Patient certainly appropriate for recertification given her advanced glioblastoma. Patient sent to the hospice house secondary to caregiver exhaustion but also patient appears to be having more symptoms. Chart reviewed especially note from Sayda Armstrong at .'s note on 5/6 states patient has been maybe little bit more depressedanniversary of her mother's passing. She definitely is eating less and her mentation seems to wax and wane. During my exam on 5/7, patient definitely appears thinner, struggling a little more with expressive aphasia and also a little more confused on where she was and why she was here. She still was able to eat and remains with left-sided weakness. 5/9; pt seen in rounds. Appears comfortable, sitting up and eating a burger for lunch. Her grandmother is at bedside. Pt smiling and denies any symptoms at this time. Does have expressive aphasia and slow delayed responses.  Had episode of Sz yesterday and was postictal but now back to baseline almost. Intermittently she does c/o back pain and generalised body ache. She is on oxycodone IR 10mg prn that she is using about 4 doses daily. Got 1 prn dose of s/l morphine 10mg as well. Pt is on several medications orally; increased pill burden; she wishes to reduce the no of pills she has to take and is interested in us looking at med list and making suggestions to stop. 5/10; pt seen in [facility] rounds; sitting up in bed, appears spaced out, per nursing staff she has had increased confusion, lethargy, having difficulty getting words out, also likely with vision deficits and not able to track or see properly in all surrounding fields, appears more pale, tired, flat affect, spoke to nurse staff about dying. Seems depressed, some difficulty swallowing especially if given multiple pills at same time, able to swallow few pills 2-3 at a time. Denies pain. Becomes anxious and short of breath intermittently    5/10 Addendum: 3.45pm    Called by Ms. Kinjal Mercedes RN that pt is not doing well. Having severe pain/headache; pressure radiating to ears, pt restless, confused, crying and saying she is ready to die. Kinjal Mercedes has medicated pt with oxycodone that she took with much difficulty swallowing but it is still not working well and pain not alleviated. I went down to see patient and assess her again. Pt's grandmother in room. By this time pt's headache has eased up a little but she still appeared very tired, slow and confused. Able to answer simple questions but slow to respond and also answers fluctuate. Pt really has not been very comfortable past several days and has had multiple medication changes, adjustments, has not slept well, drowsy during daytimes, having breakthrough episodes of pain that is intense and gripping when she has it and takes long to subside, pt having mood fluctuations; anxious, depressed, crying, smiling. Increased difficulty with swallowing mainly pills; very slow process and able to swallow 1-2 pills at a time.  I offered pt soda from straw cup and she held on to the drink in her mouth for atleast 30-40secs ? Not knowing what to do versus having difficulty swallwoing. Pt not eating much either; nibbling on food and eating chips. Having trouble with swallowing food/drinks as well now. Pt already has had 2 episodes of Sz this past week despite multiple seizure medications. Pt does not like taste of liquid keppra and other meds and having difficulty with it too. Pt also c/o weird loss of sensations in her ears especially right side, and vision deficits. Overall seems like disease progression and pt getting close to end. Spoke to grandmother and pt in length in room in presence of Ms. Shekhar Fagan and Ms Romie. Spoke to Andrea Galarza by phone and provided medical update. At this time we are deciding to cancel pt's discharge home tomorrow       5/11: pt seen in the evening; was seen earlier in the day by NP Ms Jj Kaur. Pt is unresponsive now mostly, continues with periods of increased restlessness, agitation, breathing distress and pain. Becomes visibly uncomfortable. No oral intake now  Pt had received several prn doses of dilaudid and ativan overnight. Dilaudid & Ativan were increased to 2mg IV scheduled every 4 hours. 5/12 patient is unresponsive. Now showing some evidence of increased secretions. Patient continues show some evidence of restlessness despite multiple adjustments in medication. Also some evidence of increased work of breathing. Exam:   General unresponsive, audible secretions, mildly restless  HEENT dry oral mucosa  Lungs slightly labored breathing, slightly accessory muscle use, audible secretions noted  Cardiovascularregular tachycardia  Abdomensoft  Neurounresponsive, very mildly restless      Hospice Symptoms  - Cancer related pain  - Seizures  - Fatigue/weakness  - Confusion/decreased responsiveness  - Anxiety/restlessness/agitation  - Shortness of breath  - Hospice care  -Secretions         PLAN   1.  Pt will continue GIP level care she needs frequent nursing assessment, no longer tolerating oral medication, and appears to be transitioning towards end-of-life. 2. Adjust Dilaudid  to 4mg scheduled every 2 hours and every 15mts as needed  3. Adjust Ativan IV 4mg scheduled every 2 hours and every 15mts as needed  4. Ativan 4mg IV q5mts x 3 doses for active seizures  5. Stop Decadron and all lotions/creams  6. Plan reviewed with bedside nursing team  7. Currently no family at the bedsidewe will talk with family later today. Prognosis does appear to be very short. Consider adding scheduled Robinul for secretions. 8. Continue other symptoms meds as needed  9. Pt seems to be declining overall quickly and likely has a short prognosis. 8.  and SW to support family needs.

## 2022-05-12 NOTE — PROGRESS NOTES
Problem: Pressure Injury - Risk of  Goal: *Prevention of pressure injury  Description: Document Anderson Scale and appropriate interventions in the flowsheet. Outcome: Progressing Towards Goal  Note: Pressure Injury Interventions:  Sensory Interventions: Float heels,Minimize linen layers    Moisture Interventions: Internal/External urinary devices,Minimize layers,Apply protective barrier, creams and emollients,Moisture barrier    Activity Interventions: Increase time out of bed    Mobility Interventions: HOB 30 degrees or less    Nutrition Interventions: Document food/fluid/supplement intake    Friction and Shear Interventions: Apply protective barrier, creams and emollients                Problem: Pressure Injury - Risk of  Goal: *Prevention of pressure injury  Description: Document Anderson Scale and appropriate interventions in the flowsheet. Outcome: Progressing Towards Goal  Note: Pressure Injury Interventions:  Sensory Interventions: Float heels,Minimize linen layers    Moisture Interventions: Internal/External urinary devices,Minimize layers,Apply protective barrier, creams and emollients,Moisture barrier    Activity Interventions: Increase time out of bed    Mobility Interventions: HOB 30 degrees or less    Nutrition Interventions: Document food/fluid/supplement intake    Friction and Shear Interventions: Apply protective barrier, creams and emollients                Problem: Patient Education: Go to Patient Education Activity  Goal: Patient/Family Education  Outcome: Progressing Towards Goal     Problem: Pain  Goal: Assess satisfaction of level of comfort and symptom control  Outcome: Progressing Towards Goal     Problem: Anxiety/Agitation  Goal: Verbalize or staff assess the ability to manage anxiety  Description: The patient/family/caregiver will verbalize and demonstrate ability to manage the patient's anxiety throughout hospice care.   Outcome: Progressing Towards Goal     Problem: Falls - Risk of  Goal: *Absence of Falls  Description: Document Ana Garoney Fall Risk and appropriate interventions in the flowsheet.   Outcome: Progressing Towards Goal  Note: Fall Risk Interventions:       Mentation Interventions: Bed/chair exit alarm    Medication Interventions: Bed/chair exit alarm    Elimination Interventions: Call light in reach    History of Falls Interventions: Bed/chair exit alarm         Problem: Patient Education: Go to Patient Education Activity  Goal: Patient/Family Education  Outcome: Progressing Towards Goal

## 2022-05-13 ENCOUNTER — HOME CARE VISIT (OUTPATIENT)
Dept: HOSPICE | Facility: HOSPICE | Age: 42
End: 2022-05-13
Payer: MEDICARE

## 2022-05-17 ENCOUNTER — HOME CARE VISIT (OUTPATIENT)
Dept: HOSPICE | Facility: HOSPICE | Age: 42
End: 2022-05-17
Payer: MEDICARE

## 2022-05-20 ENCOUNTER — HOME CARE VISIT (OUTPATIENT)
Dept: HOSPICE | Facility: HOSPICE | Age: 42
End: 2022-05-20
Payer: MEDICARE

## 2023-01-15 NOTE — HOSPICE
1900 Received report. 1930 Patient is in bed sitting up using her phone. Respirations are 16, face is relaxed please see flow sheet for assessment. 2025 Gave scheduled Ativan 1 mg , Lyrica 100 mg and prn of  Rozanol 10 mg for left leg pain. 2230 Patient is resting in bed looking at her iphone. 2300 Gave scheduled Ativan 1 mg, repositioned in bed and turned lights out.  0100 Patient is sleeping on left side. 0300 Patient is sleeping with a relaxed face. 1601 Patient is awake resting in bed. Gave scheduled dose of Ativan 1 mg. Patient is resting with eyes closed. 0600 Patient resting in bed with relaxed face. 0700 Report given.   NAME OF PATIENT:  Latisha Silva    LEVEL OF CARE:  Respite    REASON FOR GIP:   NA    *PATIENT REMAINS ELIGIBLE FOR GIP LEVEL OF CARE AS EVIDENCED BY: (MUST BE ADDRESSED OF PATIENT GIP)  NA    REASON FOR RESPITE:  Caregiver breakdown    O2 SAFETY:  NA    FALL INTERVENTIONS PROVIDED:   Implemented/recommended use of fall risk identification flag to all team members, Implemented/recommended assistive devices and encouraged their use, Implemented/recommended resources for alarm system (personal alarm, bed alarm, call bell, etc.) , Implemented/recommended environmental changes (remove hazards, lower bed, improve lighting, etc.) and Implemented/recommended increased supervision/assistance    INTERDISPLINARY COMMUNICATION/COLLABORATION:  Physician and RN, CNA    NEW MEDICATION INITIATION DOCUMENTATION:  NA    Reason medication is being initiated:  SILVESTRE    MD / Provider name consulted re: change in status / initiation of new medication:  NA    New Symptom(s):  NA    New Order(s):  NA    Name of the person notified of the changes:  NA    Name of person being taught:  NA    Instructions given:  NA    Side Effects taught:  NA    Response to teaching:  NA      COMFORTABLE DYING MEASURE:  Is Patient/family satisfied with symptom level?  yes    DISCHARGE PLAN:  Home after respite stay is complete. normal... Well appearing, awake, alert, oriented to person, place, time/situation and in no apparent distress.